# Patient Record
Sex: MALE | Race: WHITE | Employment: OTHER | URBAN - METROPOLITAN AREA
[De-identification: names, ages, dates, MRNs, and addresses within clinical notes are randomized per-mention and may not be internally consistent; named-entity substitution may affect disease eponyms.]

---

## 2017-02-08 ENCOUNTER — HOSPITAL ENCOUNTER (EMERGENCY)
Age: 62
Discharge: HOME | End: 2017-02-08
Attending: EMERGENCY MEDICINE
Payer: COMMERCIAL

## 2017-02-08 VITALS
SYSTOLIC BLOOD PRESSURE: 122 MMHG | DIASTOLIC BLOOD PRESSURE: 80 MMHG | TEMPERATURE: 98.1 F | HEART RATE: 108 BPM | HEIGHT: 72 IN | BODY MASS INDEX: 16.25 KG/M2 | RESPIRATION RATE: 20 BRPM | OXYGEN SATURATION: 96 % | WEIGHT: 120 LBS

## 2017-02-08 DIAGNOSIS — G56.31 RADIAL NERVE PALSY, RIGHT: ICD-10-CM

## 2017-02-08 DIAGNOSIS — M21.331 WRIST DROP, RIGHT: Primary | ICD-10-CM

## 2017-02-08 PROCEDURE — 99283 EMERGENCY DEPT VISIT LOW MDM: CPT

## 2017-02-08 PROCEDURE — L3908 WHO COCK-UP NONMOLDE PRE OTS: HCPCS

## 2017-02-08 NOTE — DISCHARGE INSTRUCTIONS
Radial Nerve Palsy: Care Instructions  Your Care Instructions    The radial nerve runs down the arm. It controls muscles in the back of the arm. It also helps with movement and feeling in the wrist and hand. If you injure the back of your arm or pinch the nerve, you might have trouble moving your arm, wrist, or hand. You might also have pain, weakness, numbness, tingling, or trouble lifting your wrist or fingers. This can also happen if you fall asleep in a way that puts pressure on the nerve, such as with your arm hanging over a chair. In most cases, no treatment is needed. You will slowly get more strength and feeling. This can take weeks or even months. Sometimes physical or occupational therapy is used to keep up muscle strength. You might also need other tests, such as nerve tests or an MRI. If you don't get better, or if the injury is more serious, surgery might be needed to fix the nerve or remove something pressing on it. Follow-up care is a key part of your treatment and safety. Be sure to make and go to all appointments, and call your doctor if you are having problems. It's also a good idea to know your test results and keep a list of the medicines you take. How can you care for yourself at home? · Be safe with medicines. Read and follow all instructions on the label. ¨ If the doctor gave you a prescription medicine for pain, take it as prescribed. ¨ If you are not taking a prescription pain medicine, ask your doctor if you can take an over-the-counter medicine. · Follow your doctor's directions for wearing a splint, brace, or other device to help you use your hand. When should you call for help? Call your doctor now or seek immediate medical care if:  · You have new or worse numbness in your arm, wrist or hand. · You have new or worse weakness in your arm, wrist or hand. · You have new pain, or your pain gets worse.   Watch closely for changes in your health, and be sure to contact your doctor if you do not get better as expected. Where can you learn more? Go to http://ema-cristian.info/. Enter D310 in the search box to learn more about \"Radial Nerve Palsy: Care Instructions. \"  Current as of: February 19, 2016  Content Version: 11.1  © 3842-1015 [employer]. Care instructions adapted under license by [employer] (which disclaims liability or warranty for this information). If you have questions about a medical condition or this instruction, always ask your healthcare professional. [address] any warranty or liability for your use of this information.

## 2017-02-08 NOTE — ED NOTES
I have reviewed discharge instructions with the patient and spouse. The patient and spouse verbalized understanding. All discharge education and paperwork given including follow up care, no questions at this time.

## 2017-02-08 NOTE — ED PROVIDER NOTES
HPI Comments: 58yoM to ED c/o right wrist drop x this morning. Pt states he slept in an arm chair last night, which is not something he usually does. Denies injury to wrist, elbow, shoulder, or neck. States he is able to move all fingers and has normal sensation. No other complaints or injuries. The history is provided by the patient. Past Medical History:   Diagnosis Date    Ankle fracture, left 10/19/2008    Cough     DJD (degenerative joint disease) of knee 11/10/2010    ED (erectile dysfunction) 11/10/2010    Fracture of left tibia 10/19/2008     Severe Grade IIIB    GERD (gastroesophageal reflux disease) 11/10/2010    Hearing loss     Left ankle pain     Left ankle sprain     Left ankle strain     Pain with urination     Sinusitis 11/10/2010    Wears glasses        Past Surgical History:   Procedure Laterality Date    Hx ankle fracture tx  10/20/2008    Hx orthopaedic       Several surgeries in the past which required ORIF of the distal tibia posterior approach and bone grafting x2 separate surgeries         Family History:   Problem Relation Age of Onset    Migraines Mother     Hypertension Other     Arthritis-osteo Other        Social History     Social History    Marital status:      Spouse name: N/A    Number of children: N/A    Years of education: N/A     Occupational History    Not on file. Social History Main Topics    Smoking status: Current Every Day Smoker     Packs/day: 1.50     Types: Cigarettes    Smokeless tobacco: Never Used    Alcohol use 0.5 - 3.0 oz/week     1 - 6 Cans of beer per week    Drug use: No    Sexual activity: Not Currently     Other Topics Concern    Not on file     Social History Narrative         ALLERGIES: Niacin    Review of Systems   All other systems reviewed and are negative.       Vitals:    02/08/17 1309   BP: 122/80   Pulse: (!) 108   Resp: 20   Temp: 98.1 °F (36.7 °C)   SpO2: 96%   Weight: 54.4 kg (120 lb)   Height: 6' (1.829 m)            Physical Exam   Constitutional: He appears well-developed and well-nourished. No distress. HENT:   Head: Normocephalic and atraumatic. Right Ear: External ear normal.   Left Ear: External ear normal.   Mouth/Throat: Oropharynx is clear and moist.   Eyes: Conjunctivae and EOM are normal. Pupils are equal, round, and reactive to light. Neck: Normal range of motion. Neck supple. Cardiovascular: Normal rate and regular rhythm. Pulmonary/Chest: Effort normal.   Abdominal: Soft. Bowel sounds are normal.   Musculoskeletal:        Right shoulder: Normal.        Right wrist: He exhibits decreased range of motion and deformity. He exhibits no tenderness, no bony tenderness, no swelling, no effusion, no crepitus and no laceration. Right upper arm: Normal.        Right forearm: Normal.   +right wrist drop   Lymphadenopathy:     He has no cervical adenopathy. Neurological: He is alert. Skin: Skin is warm and dry. No rash noted. He is not diaphoretic. Psychiatric: He has a normal mood and affect. MDM  Number of Diagnoses or Management Options  Radial nerve palsy, right:   Wrist drop, right:   Diagnosis management comments: Pt presents with right wrist drop, likely from sleeping in armchair last night. ROM and sensation intact to fingers. Will put in volar cock-up splint and advised ortho f/u. EMILIANO Epperson 1:50 PM    Splint Note      Type of Splint: volar cock up splint  Location: right wrist    Applied by tech neurovascular intact prior to splint placement neurovascular intact after splint placement splint is placed in good position.      EMILIANO Epperson  February 8, 2017  1:51 PM      Risk of Complications, Morbidity, and/or Mortality  Presenting problems: moderate  Diagnostic procedures: minimal  Management options: low    Patient Progress  Patient progress: stable    ED Course       Procedures

## 2017-11-26 ENCOUNTER — APPOINTMENT (OUTPATIENT)
Dept: CT IMAGING | Age: 62
End: 2017-11-26
Attending: PHYSICIAN ASSISTANT
Payer: COMMERCIAL

## 2017-11-26 ENCOUNTER — APPOINTMENT (OUTPATIENT)
Dept: GENERAL RADIOLOGY | Age: 62
End: 2017-11-26
Attending: EMERGENCY MEDICINE
Payer: COMMERCIAL

## 2017-11-26 ENCOUNTER — HOSPITAL ENCOUNTER (EMERGENCY)
Age: 62
Discharge: HOME | End: 2017-11-26
Attending: EMERGENCY MEDICINE | Admitting: EMERGENCY MEDICINE
Payer: COMMERCIAL

## 2017-11-26 VITALS
SYSTOLIC BLOOD PRESSURE: 119 MMHG | HEART RATE: 98 BPM | WEIGHT: 150 LBS | BODY MASS INDEX: 20.32 KG/M2 | RESPIRATION RATE: 16 BRPM | OXYGEN SATURATION: 95 % | HEIGHT: 72 IN | TEMPERATURE: 97.7 F | DIASTOLIC BLOOD PRESSURE: 73 MMHG

## 2017-11-26 DIAGNOSIS — E86.0 DEHYDRATION: ICD-10-CM

## 2017-11-26 DIAGNOSIS — F10.10 ALCOHOL ABUSE: ICD-10-CM

## 2017-11-26 DIAGNOSIS — S42.292A OTHER CLOSED DISPLACED FRACTURE OF PROXIMAL END OF LEFT HUMERUS, INITIAL ENCOUNTER: ICD-10-CM
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
## 2017-11-26 LAB
ALBUMIN SERPL-MCNC: 3 G/DL (ref 3.4–5)
ALBUMIN/GLOB SERPL: 0.7 {RATIO} (ref 0.8–1.7)
ALP SERPL-CCNC: 147 U/L (ref 45–117)
ALT SERPL-CCNC: 55 U/L (ref 16–61)
ANION GAP SERPL CALC-SCNC: 9 MMOL/L (ref 3–18)
AST SERPL-CCNC: 113 U/L (ref 15–37)
BASOPHILS # BLD: 0 K/UL (ref 0–0.1)
BASOPHILS NFR BLD: 1 % (ref 0–2)
BILIRUB SERPL-MCNC: 0.5 MG/DL (ref 0.2–1)
BUN SERPL-MCNC: 2 MG/DL (ref 7–18)
BUN/CREAT SERPL: 6 (ref 12–20)
CALCIUM SERPL-MCNC: 8.2 MG/DL (ref 8.5–10.1)
CHLORIDE SERPL-SCNC: 97 MMOL/L (ref 100–108)
CO2 SERPL-SCNC: 26 MMOL/L (ref 21–32)
CREAT SERPL-MCNC: 0.34 MG/DL (ref 0.6–1.3)
DIFFERENTIAL METHOD BLD: ABNORMAL
EOSINOPHIL # BLD: 0 K/UL (ref 0–0.4)
EOSINOPHIL NFR BLD: 1 % (ref 0–5)
ERYTHROCYTE [DISTWIDTH] IN BLOOD BY AUTOMATED COUNT: 13 % (ref 11.6–14.5)
ETHANOL SERPL-MCNC: 95 MG/DL (ref 0–3)
GLOBULIN SER CALC-MCNC: 4.1 G/DL (ref 2–4)
GLUCOSE SERPL-MCNC: 88 MG/DL (ref 74–99)
HCT VFR BLD AUTO: 37.1 % (ref 36–48)
HGB BLD-MCNC: 13.3 G/DL (ref 13–16)
LYMPHOCYTES # BLD: 1 K/UL (ref 0.9–3.6)
LYMPHOCYTES NFR BLD: 28 % (ref 21–52)
MAGNESIUM SERPL-MCNC: 1.9 MG/DL (ref 1.6–2.6)
MCH RBC QN AUTO: 34.5 PG (ref 24–34)
MCHC RBC AUTO-ENTMCNC: 35.8 G/DL (ref 31–37)
MCV RBC AUTO: 96.1 FL (ref 74–97)
MONOCYTES # BLD: 0.3 K/UL (ref 0.05–1.2)
MONOCYTES NFR BLD: 9 % (ref 3–10)
NEUTS SEG # BLD: 2.1 K/UL (ref 1.8–8)
NEUTS SEG NFR BLD: 61 % (ref 40–73)
PLATELET # BLD AUTO: 110 K/UL (ref 135–420)
PMV BLD AUTO: 11 FL (ref 9.2–11.8)
POTASSIUM SERPL-SCNC: 3.8 MMOL/L (ref 3.5–5.5)
PROT SERPL-MCNC: 7.1 G/DL (ref 6.4–8.2)
RBC # BLD AUTO: 3.86 M/UL (ref 4.7–5.5)
SODIUM SERPL-SCNC: 132 MMOL/L (ref 136–145)
WBC # BLD AUTO: 3.5 K/UL (ref 4.6–13.2)

## 2017-11-26 PROCEDURE — 80307 DRUG TEST PRSMV CHEM ANLYZR: CPT | Performed by: PHYSICIAN ASSISTANT

## 2017-11-26 PROCEDURE — 80053 COMPREHEN METABOLIC PANEL: CPT | Performed by: PHYSICIAN ASSISTANT

## 2017-11-26 PROCEDURE — 83735 ASSAY OF MAGNESIUM: CPT | Performed by: PHYSICIAN ASSISTANT

## 2017-11-26 PROCEDURE — 75810000053 HC SPLINT APPLICATION

## 2017-11-26 PROCEDURE — 90715 TDAP VACCINE 7 YRS/> IM: CPT | Performed by: PHYSICIAN ASSISTANT

## 2017-11-26 PROCEDURE — 99284 EMERGENCY DEPT VISIT MOD MDM: CPT

## 2017-11-26 PROCEDURE — 90471 IMMUNIZATION ADMIN: CPT

## 2017-11-26 PROCEDURE — 72125 CT NECK SPINE W/O DYE: CPT

## 2017-11-26 PROCEDURE — 96374 THER/PROPH/DIAG INJ IV PUSH: CPT

## 2017-11-26 PROCEDURE — 96376 TX/PRO/DX INJ SAME DRUG ADON: CPT

## 2017-11-26 PROCEDURE — 74011250636 HC RX REV CODE- 250/636: Performed by: PHYSICIAN ASSISTANT

## 2017-11-26 PROCEDURE — 70450 CT HEAD/BRAIN W/O DYE: CPT

## 2017-11-26 PROCEDURE — 96361 HYDRATE IV INFUSION ADD-ON: CPT

## 2017-11-26 PROCEDURE — 73060 X-RAY EXAM OF HUMERUS: CPT

## 2017-11-26 PROCEDURE — 85025 COMPLETE CBC W/AUTO DIFF WBC: CPT | Performed by: PHYSICIAN ASSISTANT

## 2017-11-26 RX ORDER — OXYCODONE AND ACETAMINOPHEN 5; 325 MG/1; MG/1
1 TABLET ORAL
Qty: 12 TAB | Refills: 0 | Status: SHIPPED | OUTPATIENT
Start: 2017-11-26 | End: 2018-08-07

## 2017-11-26 RX ORDER — FENTANYL CITRATE 50 UG/ML
50 INJECTION, SOLUTION INTRAMUSCULAR; INTRAVENOUS
Status: COMPLETED | OUTPATIENT
Start: 2017-11-26 | End: 2017-11-26

## 2017-11-26 RX ORDER — SODIUM CHLORIDE 9 MG/ML
1000 INJECTION, SOLUTION INTRAVENOUS ONCE
Status: COMPLETED | OUTPATIENT
Start: 2017-11-26 | End: 2017-11-26

## 2017-11-26 RX ADMIN — SODIUM CHLORIDE 1000 ML: 9 INJECTION, SOLUTION INTRAVENOUS at 11:37

## 2017-11-26 RX ADMIN — FENTANYL CITRATE 50 MCG: 50 INJECTION INTRAMUSCULAR; INTRAVENOUS at 13:07

## 2017-11-26 RX ADMIN — TETANUS TOXOID, REDUCED DIPHTHERIA TOXOID AND ACELLULAR PERTUSSIS VACCINE, ADSORBED 0.5 ML: 5; 2.5; 8; 8; 2.5 SUSPENSION INTRAMUSCULAR at 14:35

## 2017-11-26 RX ADMIN — SODIUM CHLORIDE 1000 ML: 9 INJECTION, SOLUTION INTRAVENOUS at 12:51

## 2017-11-26 RX ADMIN — FENTANYL CITRATE 50 MCG: 50 INJECTION INTRAMUSCULAR; INTRAVENOUS at 14:53

## 2017-11-26 NOTE — ED NOTES
Pt discharged per MD order. Pt verbalized understanding of discharge instructions and follow up care. Prescription education given. Pt ambulated independently out of ED.

## 2017-11-26 NOTE — DISCHARGE INSTRUCTIONS
MyNines Activation    Thank you for requesting access to MyNines. Please follow the instructions below to securely access and download your online medical record. MyNines allows you to send messages to your doctor, view your test results, renew your prescriptions, schedule appointments, and more. How Do I Sign Up? 1. In your internet browser, go to [email or web address]  2. Click on the First Time User? Click Here link in the Sign In box. You will be redirect to the New Member Sign Up page. 3. Enter your MyNines Access Code exactly as it appears below. You will not need to use this code after youve completed the sign-up process. If you do not sign up before the expiration date, you must request a new code. MyNines Access Code: [email or web address]  Expires: 2018  2:32 PM (This is the date your MyNines access code will )    4. Enter the last four digits of your Social Security Number (xxxx) and Date of Birth (mm/dd/yyyy) as indicated and click Submit. You will be taken to the next sign-up page. 5. Create a MyNines ID. This will be your MyNines login ID and cannot be changed, so think of one that is secure and easy to remember. 6. Create a MyNines password. You can change your password at any time. 7. Enter your Password Reset Question and Answer. This can be used at a later time if you forget your password. 8. Enter your e-mail address. You will receive e-mail notification when new information is available in [address]. 9. Click Sign Up. You can now view and download portions of your medical record. 10. Click the Download Summary menu link to download a portable copy of your medical information. Additional Information    If you have questions, please visit the Frequently Asked Questions section of the MyNines website at [email or web address] [email or web address]/. Remember, MyNines is NOT to be used for urgent needs. For medical emergencies, dial 911.

## 2017-11-26 NOTE — ED PROVIDER NOTES
HPI Comments: 63 yr old male, hx GERD, ED, DJD, and alcohol abuse presents to the ED complaining of L upper arm pain after a fall at 2 am this morning. Pt states he got up in the middle of the night and tripped over the carpet falling into a chair. Pt is unsure if he had a head injury or lost consciousness at that time. Pt smells of alcohol in the exam room. He states he \"drank a few beers\" earlier today. Pt's wife reports he is a heavy drinker and drinks \"several beers a day. \" Pt denies neck pain, vision changes, dizziness, back pain, and abdominal sx. No other complaints. Patient is a 63 y.o. male presenting with arm problem. Arm Injury    Pertinent negatives include no back pain and no neck pain. Past Medical History:   Diagnosis Date    Ankle fracture, left 10/19/2008    Cough     DJD (degenerative joint disease) of knee 11/10/2010    ED (erectile dysfunction) 11/10/2010    Fracture of left tibia 10/19/2008    Severe Grade IIIB    GERD (gastroesophageal reflux disease) 11/10/2010    Hearing loss     Left ankle pain     Left ankle sprain     Left ankle strain     Pain with urination     Sinusitis 11/10/2010    Wears glasses        Past Surgical History:   Procedure Laterality Date    HX ANKLE FRACTURE TX  10/20/2008    HX ORTHOPAEDIC      Several surgeries in the past which required ORIF of the distal tibia posterior approach and bone grafting x2 separate surgeries         Family History:   Problem Relation Age of Onset    Migraines Mother     Hypertension Other     Arthritis-osteo Other        Social History     Social History    Marital status:      Spouse name: N/A    Number of children: N/A    Years of education: N/A     Occupational History    Not on file.      Social History Main Topics    Smoking status: Current Every Day Smoker     Packs/day: 1.50     Types: Cigarettes    Smokeless tobacco: Never Used    Alcohol use 0.5 - 3.0 oz/week     1 - 6 Cans of beer per week    Drug use: No    Sexual activity: Not Currently     Other Topics Concern    Not on file     Social History Narrative         ALLERGIES: Niacin    Review of Systems   Constitutional: Negative. Negative for chills, diaphoresis, fatigue and fever. HENT: Negative. Negative for congestion, ear pain, rhinorrhea and sore throat. Eyes: Negative. Negative for pain and redness. Respiratory: Negative. Negative for cough, shortness of breath, wheezing and stridor. Cardiovascular: Negative. Negative for chest pain and leg swelling. Gastrointestinal: Negative. Negative for abdominal pain, constipation, diarrhea, nausea and vomiting. Endocrine: Negative. Genitourinary: Negative. Negative for dysuria, flank pain, frequency and hematuria. Musculoskeletal: Positive for arthralgias. Negative for back pain, myalgias, neck pain and neck stiffness. Skin: Negative. Negative for rash and wound. Allergic/Immunologic: Negative. Neurological: Negative. Negative for dizziness, seizures, weakness and headaches. Hematological: Negative. Psychiatric/Behavioral: Negative. All other systems reviewed and are negative. Vitals:    11/26/17 0910   BP: 95/73   Pulse: 98   Resp: 16   Temp: 97.7 °F (36.5 °C)   SpO2: 95%   Weight: 68 kg (150 lb)   Height: 6' (1.829 m)            Physical Exam   Constitutional: He is oriented to person, place, and time. He appears well-developed and well-nourished. He appears distressed. Pt appears moderately distressed in the exam room    HENT:   Head: Normocephalic and atraumatic. Neck: Normal range of motion. Neck supple. No midline TTP noted along the posterior cervical spine    Cardiovascular: Normal rate, regular rhythm and normal heart sounds. Exam reveals no gallop and no friction rub. No murmur heard. Pulmonary/Chest: Effort normal and breath sounds normal. No stridor. No respiratory distress. He has no wheezes. He has no rales.    Musculoskeletal: He exhibits edema and tenderness. LUE: intact pulses, good sensation,  strength 4/5, no bony tenderness noted over the wrist, digits, or elbow. Edema and significant TTP noted to the proximal humerus just distal to the humeral head, no obvious shoulder deformity noted. Limited ROM of the shoulder due to pain. Neurological: He is alert and oriented to person, place, and time. Skin: Skin is warm and dry. No rash noted. He is not diaphoretic. No erythema. Psychiatric: He has a normal mood and affect. His behavior is normal. Thought content normal.   Pt smells of alcohol, but no slurred speech or other signs of acute intoxication noted. Nursing note and vitals reviewed. MDM  Number of Diagnoses or Management Options  Diagnosis management comments: Impression:  Humerus fx, fall, alcohol abuse, dehydration, elevated LFTs    IV inserted 2 L saline given, pt initially hypotensive, after 1 L fluids received his systolic pressure improved and 50 fentanyl given     WBC 3.5, RBC 3.86, MCH 34.5, plt 110, Na 132, Cl 97, BUN 2, Cr 0.34, BUCR 6, Ca 8.2, SGOT 113, , ALB 3, GLOB 4.1, AGRAT 0.7, alcohol 95    X-ray Acute transverse fracture through the proximal shaft of left humerus with  posterior angulation and posterior subluxation as above.     No shoulder dislocation.     CT head: No acute intracranial abnormality. CT cervical: No definite CT evidence of acute C-spine injury seen.     Minimal retrolisthesis of C5 on C6, likely chronic with associated mild DDD and  Spondylosis. Consulted with Dr. Hali Galeana, who recommends contacting ortho on call. Spoke with Dr. Harmony Argueta and discussed the case. Pt is intoxicated and cannot be operated on today. Recommendation per Dr. Harmony Argueta is to splint the pt and have him be seen in the office tomorrow. I have explained to the pt that he will have to be sober in order to electively have this procedure done. Pt agrees with the plan.  Splint and sling applied. Patient is stable for discharge at this time. Rx for percocet given. Rest and follow-up with ortho tomorrow. Return to the ED immediately for any new or worsening sx.   Ramila Robb PA-C 2:31 PM        Amount and/or Complexity of Data Reviewed  Clinical lab tests: reviewed and ordered  Tests in the radiology section of CPT®: ordered and reviewed    Risk of Complications, Morbidity, and/or Mortality  Presenting problems: moderate  Diagnostic procedures: moderate  Management options: moderate    Patient Progress  Patient progress: stable    ED Course       Procedures

## 2017-11-26 NOTE — ED TRIAGE NOTES
The patient presents with complaint of left upper arm and shoulder pain that began last night after a fall.

## 2017-11-27 ENCOUNTER — OFFICE VISIT (OUTPATIENT)
Dept: ORTHOPEDIC SURGERY | Age: 62
End: 2017-11-27

## 2017-11-27 VITALS
HEART RATE: 102 BPM | OXYGEN SATURATION: 100 % | TEMPERATURE: 98.6 F | HEIGHT: 72 IN | SYSTOLIC BLOOD PRESSURE: 135 MMHG | BODY MASS INDEX: 19.64 KG/M2 | DIASTOLIC BLOOD PRESSURE: 89 MMHG | WEIGHT: 145 LBS

## 2017-11-27 DIAGNOSIS — S42.292A OTHER CLOSED DISPLACED FRACTURE OF PROXIMAL END OF LEFT HUMERUS, INITIAL ENCOUNTER: Primary | ICD-10-CM

## 2017-11-27 RX ORDER — OXYCODONE AND ACETAMINOPHEN 5; 325 MG/1; MG/1
1-2 TABLET ORAL
Qty: 60 TAB | Refills: 0 | Status: SHIPPED | OUTPATIENT
Start: 2017-11-27 | End: 2018-05-22 | Stop reason: SDUPTHER

## 2017-11-27 NOTE — PROGRESS NOTES
Bernardo Kraft  1955   Chief Complaint   Patient presents with    Shoulder Pain     LEFT        HISTORY OF PRESENT ILLNESS  Bernardo Kraft is a 63 y.o. male who presents today for evaluation of left proximal humerus fracture. he rates his pain 10/10 today. Patient recalls drinking Saturday night when he tripped on a throw rug and fell onto the outstretched left arm. Patient seen in the ED following this and placed in a sling and provided prescription for Percocet Patient describes the pain as aching and sharp that is Constant in nature. Symptoms are worse with movement of the LUE and is better with  pain medicine. Associated symptoms include pain. Since problem started, it: is unchanged. Pain does wake patient up at night. Has taken Percocet for the problem. Has tried following treatments: Injections:YES; Brace:NO; Therapy:NO; Cane/Crutch:NO       Allergies   Allergen Reactions    Niacin Rash        Past Medical History:   Diagnosis Date    Ankle fracture, left 10/19/2008    Cough     DJD (degenerative joint disease) of knee 11/10/2010    ED (erectile dysfunction) 11/10/2010    Fracture of left tibia 10/19/2008    Severe Grade IIIB    GERD (gastroesophageal reflux disease) 11/10/2010    Hearing loss     Left ankle pain     Left ankle sprain     Left ankle strain     Pain with urination     Sinusitis 11/10/2010    Wears glasses       Social History     Social History    Marital status:      Spouse name: N/A    Number of children: N/A    Years of education: N/A     Occupational History    Not on file.      Social History Main Topics    Smoking status: Current Every Day Smoker     Packs/day: 1.50     Types: Cigarettes    Smokeless tobacco: Never Used    Alcohol use 0.5 - 3.0 oz/week     1 - 6 Cans of beer per week    Drug use: No    Sexual activity: Not Currently     Other Topics Concern    Not on file     Social History Narrative      Past Surgical History:   Procedure Laterality Date    HX ANKLE FRACTURE TX  10/20/2008    HX ORTHOPAEDIC      Several surgeries in the past which required ORIF of the distal tibia posterior approach and bone grafting x2 separate surgeries      Family History   Problem Relation Age of Onset   [facility] Migraines Mother     Hypertension Other     Arthritis-osteo Other       Current Outpatient Prescriptions   Medication Sig    oxyCODONE-acetaminophen (PERCOCET) 5-325 mg per tablet Take 1-2 Tabs by mouth every four (4) hours as needed for Pain. Max Daily Amount: 12 Tabs. Do not take until after surgery    oxyCODONE-acetaminophen (PERCOCET) 5-325 mg per tablet Take 1 Tab by mouth every six (6) hours as needed for Pain. Max Daily Amount: 4 Tabs. No current facility-administered medications for this visit. REVIEW OF SYSTEM   Patient denies: Weight loss, Fever/Chills, HA, Visual changes, Fatigue, Chest pain, SOB, Abdominal pain, N/V/D/C, Blood in stool or urine, Edema. Pertinent positive as above in HPI. All others were negative    PHYSICAL EXAM:   Visit Vitals    /89    Pulse (!) 102    Temp 98.6 °F (37 °C) (Oral)    Ht 6' (1.829 m)    Wt 145 lb (65.8 kg)    SpO2 100%    BMI 19.67 kg/m2     The patient is a well-developed, well-nourished male   in no acute distress. The patient is alert and oriented times three. The patient is alert and oriented times three. Mood and affect are normal.  LYMPHATIC: lymph nodes are not enlarged and are within normal limits  SKIN: normal in color and non tender to palpation. There are no bruises or abrasions noted. NEUROLOGICAL: Motor sensory exam is within normal limits. Reflexes are equal bilaterally.  There is normal sensation to pinprick and light touch  MUSCULOSKELETAL:  Examination Left shoulder   Skin Intact   AC joint tenderness -   Biceps tenderness +   Forward flexion/Elevation ROM 20   Active abduction ROM 20   Glenohumeral abduction 20   External rotation ROM 0 Internal rotation ROM 0   Apprehension -   Cristals Relocation -   Jerk -   Load and Shift -   Obriens -   Speeds -   Impingement sign -   Supraspinatus/Empty Can -, 5/5   External Rotation Strength -, 5/5   Lift Off/Belly Press -, 5/5   Neurovascular Intact        IMAGING:   XR of the left humerus dated 11/27/17 was reviewed and read:   IMPRESSION:    Acute transverse fracture through the proximal shaft of left humerus with posterior angulation and posterior subluxation as above. No shoulder dislocation. IMPRESSION:      ICD-10-CM ICD-9-CM    1. Other closed displaced fracture of proximal end of left humerus, initial encounter S42.292A 812.09 EKG, 12 LEAD, INITIAL        PLAN:  1. I discussed the risks and benefits and potential adverse outcomes of both operative vs non operative treatment of left proximal humerus fracture with the patient. Patient wishes to proceed with left proximal humerus ORIF. Risks of operative intervention include but not limited to bleeding, infection, deep vein thrombosis, pulmonary embolism, death, limb length discrepancy, reflexive sympathetic dystrophy, fat embolism syndrome,damage to blood vessels and nerves, malunion, non-union, delayed union, failure of hardware, post traumatic arthritis, stroke, heart attack, and death. Patient understands that infection may arise and may require numerous surgeries. History and physical exam performed today. Risk factors include: smoker  2. No cortisone injection indicated today   3. No Physical/Occupational Therapy indicated today  4. No diagnostic test indicated today  5. No durable medical equipment indicated today  6. No referral indicated today   7. No medications indicated today  8.  No Narcotic indicated today       RTC H&P performed today  Follow-up Disposition: Not on File    Scribed by Yanira Hernandes [address] [place] [address] [address]) as dictated by Miguel Lagos MD    I, Dr. Miguel Lagos, confirm that all documentation is accurate.     Elmira Arizmendi M.D.   [facility]

## 2017-11-27 NOTE — LETTER
Patient: Marylu Pérez Surgery Date: 11/29/17  Time: 1:30PM 
 
PROCEDURE: LEFT SHOULDER ORIF Report for your Surgery at 12:00PM  @ [id number] [place]: 
  
o Five (5) days prior to surgery STOP taking ASPIRIN, ANTI-INFLAMMATORY , and/or BLOOD THINNER MEDICATIONS (such as COUMADIN, PLAVIX, HEPARIN or others). o If you are taking blood thinner medication please contact your prescribing physician for any special instructions. LAB: Report to  [facility]  or [facility]  14 days before your surgery date. (Your orders for these tests are in [facility] at the [address] may not eat or drink anything after midnight the night before  
o It doesn't matter if you have eaten before going to the [address] 
o If required labs and EKG are not completed Surgery will be canceled. THE DAY OF SURGERY: 
  
 
o Do not eat, chew gum or drink anything after Midnight prior to the date of your surgery 
o Take your regular medications with small sips of water unless otherwise instructed. (This means blood pressure and/or heart medicine) If you are insulin dependent, bring your insulin with you, unless otherwise instructed. 
o Bring a list of your medications and the dosage to the hospital. 
o Do not wear nail polish, make-up or jewelry. o Do not bring valuables or money to the hospital. 
o Have someone accompany you so they may drive you home following the surgery. Bring your drivers license, your insurance card and Hospital co-pay. Return for your postoperative visit on Wednesday, December 06, 2017 02:30 PM at the [id number] [place], [address] This appointment will be with , Dr. Concepcion Cleveland physician assistant. Shari Richmond  [phone]      Surgical Coordinator

## 2017-11-27 NOTE — H&P
HISTORY AND PHYSICAL          Patient: Zehra Cadet                MRN: [id number]       SSN: [id number]  YOB: 1955          AGE: 63 y.o. SEX: male      Patient scheduled for:  Left Humeral Intramedullary Rodding  Surgeon: Tiarra Bazzi MD    ANESTHESIA TYPE:  General    HISTORY:     The patient was seen in the office today for a preoperative history and physical for an upcoming above listed surgery. The patient is a pleasant 63 y.o. male who has a history of left proximal humerus fracture. he rates his pain 10/10 today. Patient recalls drinking Saturday night when he tripped on a throw rug and fell onto the outstretched left arm. Patient seen in the ED following this and placed in a sling and provided prescription for Percocet Patient describes the pain as aching and sharp that is Constant in nature. Symptoms are worse with movement of the LUE and is better with  pain medicine. Due to the current findings, affected activity of daily living and continued pain and discomfort, surgical intervention is indicated. The alternatives, risks, and complications, including but not limited to infection, blood loss, need for blood transfusion, neurovascular damage, tika-incisional numbness, subcutaneous hematoma, bone fracture, anesthetic complications, DVT, PE, death, RSD, postoperative stiffness and pain, possible surgical scar, delayed healing and nonhealing, reflexive sympathetic dystrophy, damage to blood vessels and nerves, need for more surgery, MI, and stroke,  failure of hardware, gait disturbances,have been discussed. The patient understands and wishes to proceed with surgery.      PAST MEDICAL HISTORY:     Past Medical History:   Diagnosis Date    Ankle fracture, left 10/19/2008    Cough     DJD (degenerative joint disease) of knee 11/10/2010    ED (erectile dysfunction) 11/10/2010    Fracture of left tibia 10/19/2008    Severe Grade IIIB    GERD (gastroesophageal reflux disease) 11/10/2010    Hearing loss     Left ankle pain     Left ankle sprain     Left ankle strain     Pain with urination     Sinusitis 11/10/2010    Wears glasses        CURRENT MEDICATIONS:     Current Outpatient Prescriptions   Medication Sig Dispense Refill    oxyCODONE-acetaminophen (PERCOCET) 5-325 mg per tablet Take 1-2 Tabs by mouth every four (4) hours as needed for Pain. Max Daily Amount: 12 Tabs. Do not take until after surgery 60 Tab 0    oxyCODONE-acetaminophen (PERCOCET) 5-325 mg per tablet Take 1 Tab by mouth every six (6) hours as needed for Pain. Max Daily Amount: 4 Tabs. 12 Tab 0       ALLERGIES:     Allergies   Allergen Reactions    Niacin Rash         SURGICAL HISTORY:     Past Surgical History:   Procedure Laterality Date    HX ANKLE FRACTURE TX  10/20/2008    HX ORTHOPAEDIC      Several surgeries in the past which required ORIF of the distal tibia posterior approach and bone grafting x2 separate surgeries       SOCIAL HISTORY:     Social History     Social History    Marital status:      Spouse name: N/A    Number of children: N/A    Years of education: N/A     Social History Main Topics    Smoking status: Current Every Day Smoker     Packs/day: 1.50     Types: Cigarettes    Smokeless tobacco: Never Used    Alcohol use 0.5 - 3.0 oz/week     1 - 6 Cans of beer per week    Drug use: No    Sexual activity: Not Currently     Other Topics Concern    None     Social History Narrative       FAMILY HISTORY:     Family History   Problem Relation Age of Onset   [facility] Mother     Hypertension Other     Arthritis-osteo Other        REVIEW OF SYSTEMS:     Negative for fevers, chills, chest pain, shortness of breath, weight loss, recent illness     General: Negative for fever and chills. No unexpected change in weight. Denies fatigue. No change in appetite. Skin: Negative for rash or itching.    HEENT: Negative for congestion, sore throat, neck pain and neck stiffness. No change in vision or hearing. Hasn't noted any enlarged lymph nodes in the neck. Cardiovascular:  Negative for chest pain and palpitations. Has not noted pedal edema. Respiratory: Negative for cough, colds, sinus, hemoptysis, shortness of breath and wheezing. Gastrointestinal: Negative for nausea and vomiting, rectal bleeding, coffee ground emesis, abdominal pain, diarrhea and constipation. Genitourinary: Negative for dysuria, frequency urgency, or burning on micturition. No flank pain, no foul smelling urine, no difficulty with initiating urination. Hematological: Negative for bleeding or easy bruising. Musculoskeletal: Negative  for arthralgias, back pain or neck pain. Neurological: Negative for dizziness, seizures or syncopal episodes. Denies headaches. Endocrine: Denies excessive thirst.  No heat/cold intolerance. Psychiatric: Negative for depression or insomnia. PHYSICAL EXAMINATION:     VITALS:   Visit Vitals    /89    Pulse (!) 102    Temp 98.6 °F (37 °C) (Oral)    Ht 6' (1.829 m)    Wt 145 lb (65.8 kg)    SpO2 100%    BMI 19.67 kg/m2     GEN:  Well developed, well nourished 63 y.o. male in no acute distress. HEENT: Normocephalic and atraumatic. Eyes: Conjunctivae and EOM are normal.Pupils are equal, round, and reactive to light. External ear normal appearance, external nose normal appearing. Mouth/Throat: Oropharynx is clear and moist, able to handle oral secretions w/out difficulty, airway patent  NECK: Supple. Normal ROM, No lymphadenopathy. Trachea is midline. No bruising, swelling or deformity  RESP: Clear to auscultation bilaterally. No wheezes, rales, rhonchi. Normal effort and breath sounds. No respiratory distress  CARDIO: Normal rate, regular rhythm and normal heart sounds. No MGR. ABDOMEN: Soft, non-tender, non-distended, normoactive bowel sounds in all four quadrants. There is no tenderness. There is no rebound and no guarding.    BACK: No CVA or spinal tenderness  BREAST:  Deferred  PELVIC:    Deferred   RECTAL:  Deferred   :           Deferred  EXTREMITIES: EXAMINATION OF: left shoulder  Examination Left shoulder   Skin Intact   AC joint tenderness -   Biceps tenderness +   Forward flexion/Elevation ROM 20   Active abduction ROM 20   Glenohumeral abduction 20   External rotation ROM 0   Internal rotation ROM 0   Apprehension -   Cristals Relocation -   Jerk -   Load and Shift -   Obriens -   Speeds -   Impingement sign -   Supraspinatus/Empty Can -, 5/5   External Rotation Strength -, 5/5   Lift Off/Belly Press -, 5/5   Neurovascular Intact        NEUROVASCULAR: Sensation intact to light touch and strength grossly intact and symmetrical. No nystagmus. Positive distal pulses and capillary refill. DVT ASSESSMENT:  There is not  calf tenderness. No evidence of DVT seen on physical exam.  MOTOR: In tact  PSYCH: Alert an oriented to person, place and time. Mood, memory, affect, behavior and judgment normal       RADIOGRAPHS & DIAGNOSTIC STUDIES:     MRI/xray reveals :   XR of the left humerus dated 11/27/17 was reviewed and read:   IMPRESSION:    Acute transverse fracture through the proximal shaft of left humerus with posterior angulation and posterior subluxation as above.   No shoulder dislocation.         LABS:       @  CBC:   Lab Results   Component Value Date/Time    WBC 3.5 11/26/2017 10:45 AM    RBC 3.86 11/26/2017 10:45 AM    HGB 13.3 11/26/2017 10:45 AM    HCT 37.1 11/26/2017 10:45 AM    PLATELET 583 17/20/2382 10:45 AM    and BMP:   Lab Results   Component Value Date/Time    Glucose 88 11/26/2017 10:45 AM    Sodium 132 11/26/2017 10:45 AM    Potassium 3.8 11/26/2017 10:45 AM    Chloride 97 11/26/2017 10:45 AM    CO2 26 11/26/2017 10:45 AM    BUN 2 11/26/2017 10:45 AM    Creatinine 0.34 11/26/2017 10:45 AM    Calcium 8.2 11/26/2017 10:45 AM   @    Preoperative labs were reviewed and are substantially within normal limits  EKG: pending      ASSESSMENT:       Encounter Diagnosis   Name Primary?  Other closed displaced fracture of proximal end of left humerus, initial encounter Yes       PLAN:     Again, the alternatives, risks, and complications, as well as expected outcome were discussed. The patient understands and agrees to proceed with Left Humeral Intramedullary Rodding pending EKG results.  Patient given orders listed below:    Orders Placed This Encounter    EKG, 12 LEAD, INITIAL    oxyCODONE-acetaminophen (PERCOCET) 5-325 mg per tablet         Oneyda Dubose PA-C  11/27/2017  2:02 PM

## 2017-11-27 NOTE — LETTER
[facility] Surgery [facility] Fax to [phone]                                        Telephone: [phone] or [phone] To be completed by Physician Office: 
 
Date: 2017    Requested by: Jacey Sanchez Phone No: [phone]  Fax No:  [phone] Surgery Date: 17 Requested Time: 3RD CASE Surgeon: Ned Justice MD  Assistant/2nd Surgeon: Please Telida:            EMERGENT       URGENT         ELECTIVE  
CPT CODE*  Procedure 20156 LEFT SHOULDER  IM JANUARY *CPT code is required for ALL procedures. Patient Information: 
 
Name: Leodan Boucher SSN: [id number]  : 1955   Male/Female: male Home Phone No: [phone] (home) Primary Insurance: [employer] Insurance Number: [id number] *If self-pay, please fax Self-Pay/Martha Verification Form with this request. Elective cases will not be scheduled until approved. Latex Allergy:  Yes Allergies: Allergies Allergen Reactions  Niacin Rash  
 
 
 ICD10 code(s): S42.292A  Diagnosis: CLOSED DISPLACED FRACTURE OF PROXIMAL END OF LEFT HUMERUS Admission:  Outpatient Anesthesia Type General 
 
Comments/Special Equipment and/or  SYNTHES  C-Arm   

## 2017-11-28 ENCOUNTER — HOSPITAL ENCOUNTER (OUTPATIENT)
Dept: PREADMISSION TESTING | Age: 62
Discharge: HOME | End: 2017-11-28
Payer: COMMERCIAL

## 2017-11-28 ENCOUNTER — ANESTHESIA EVENT (OUTPATIENT)
Dept: SURGERY | Age: 62
End: 2017-11-28
Payer: COMMERCIAL

## 2017-11-28 DIAGNOSIS — Z01.818 PRE-OP EXAM: ICD-10-CM

## 2017-11-28 LAB
ATRIAL RATE: 117 BPM
CALCULATED R AXIS, ECG10: 66 DEGREES
CALCULATED T AXIS, ECG11: 46 DEGREES
DIAGNOSIS, 93000: NORMAL
Q-T INTERVAL, ECG07: 316 MS
QRS DURATION, ECG06: 88 MS
QTC CALCULATION (BEZET), ECG08: 492 MS
VENTRICULAR RATE, ECG03: 146 BPM

## 2017-11-28 PROCEDURE — 93005 ELECTROCARDIOGRAM TRACING: CPT

## 2017-11-29 ENCOUNTER — ANESTHESIA (OUTPATIENT)
Dept: SURGERY | Age: 62
End: 2017-11-29
Payer: COMMERCIAL

## 2017-11-29 ENCOUNTER — HOSPITAL ENCOUNTER (OUTPATIENT)
Age: 62
Setting detail: OUTPATIENT SURGERY
Discharge: HOME | End: 2017-11-29
Attending: ORTHOPAEDIC SURGERY | Admitting: ORTHOPAEDIC SURGERY
Payer: COMMERCIAL

## 2017-11-29 ENCOUNTER — APPOINTMENT (OUTPATIENT)
Dept: GENERAL RADIOLOGY | Age: 62
End: 2017-11-29
Attending: ORTHOPAEDIC SURGERY
Payer: COMMERCIAL

## 2017-11-29 VITALS
DIASTOLIC BLOOD PRESSURE: 86 MMHG | OXYGEN SATURATION: 98 % | RESPIRATION RATE: 14 BRPM | WEIGHT: 113.31 LBS | BODY MASS INDEX: 15.35 KG/M2 | HEIGHT: 72 IN | SYSTOLIC BLOOD PRESSURE: 139 MMHG | HEART RATE: 75 BPM | TEMPERATURE: 97.3 F

## 2017-11-29 PROCEDURE — 74011250636 HC RX REV CODE- 250/636: Performed by: NURSE ANESTHETIST, CERTIFIED REGISTERED

## 2017-11-29 PROCEDURE — L3650 SO 8 ABD RESTRAINT PRE OTS: HCPCS | Performed by: ORTHOPAEDIC SURGERY

## 2017-11-29 PROCEDURE — 77030017024 HC ENDCAP HUM NL1 SYNT -C: Performed by: ORTHOPAEDIC SURGERY

## 2017-11-29 PROCEDURE — 77030031139 HC SUT VCRL2 J&J -A: Performed by: ORTHOPAEDIC SURGERY

## 2017-11-29 PROCEDURE — 74011250636 HC RX REV CODE- 250/636: Performed by: ORTHOPAEDIC SURGERY

## 2017-11-29 PROCEDURE — 74011250636 HC RX REV CODE- 250/636

## 2017-11-29 PROCEDURE — 74011250637 HC RX REV CODE- 250/637: Performed by: ANESTHESIOLOGY

## 2017-11-29 PROCEDURE — 77030014405 HC GD ROD RMR SYNT -C: Performed by: ORTHOPAEDIC SURGERY

## 2017-11-29 PROCEDURE — 77030032490 HC SLV COMPR SCD KNE COVD -B: Performed by: ORTHOPAEDIC SURGERY

## 2017-11-29 PROCEDURE — 77030000031 HC BIT DRL QC SYNT -C: Performed by: ORTHOPAEDIC SURGERY

## 2017-11-29 PROCEDURE — 76010000149 HC OR TIME 1 TO 1.5 HR: Performed by: ORTHOPAEDIC SURGERY

## 2017-11-29 PROCEDURE — 76210000006 HC OR PH I REC 0.5 TO 1 HR: Performed by: ORTHOPAEDIC SURGERY

## 2017-11-29 PROCEDURE — 77030018846 HC SOL IRR STRL H20 ICUM -A: Performed by: ORTHOPAEDIC SURGERY

## 2017-11-29 PROCEDURE — 77030007327 HC GD ROD SYNT -C: Performed by: ORTHOPAEDIC SURGERY

## 2017-11-29 PROCEDURE — 77030008848 HC WRE K SYNT -B: Performed by: ORTHOPAEDIC SURGERY

## 2017-11-29 PROCEDURE — 73060 X-RAY EXAM OF HUMERUS: CPT

## 2017-11-29 PROCEDURE — 77030006618 HC IMPL BLD F/HUM SPRL NL SYNT -F: Performed by: ORTHOPAEDIC SURGERY

## 2017-11-29 PROCEDURE — 77030003666 HC NDL SPINAL BD -A: Performed by: ORTHOPAEDIC SURGERY

## 2017-11-29 PROCEDURE — 74011250637 HC RX REV CODE- 250/637: Performed by: NURSE ANESTHETIST, CERTIFIED REGISTERED

## 2017-11-29 PROCEDURE — 77030003787: Performed by: ORTHOPAEDIC SURGERY

## 2017-11-29 PROCEDURE — 76210000021 HC REC RM PH II 0.5 TO 1 HR: Performed by: ORTHOPAEDIC SURGERY

## 2017-11-29 PROCEDURE — C1713 ANCHOR/SCREW BN/BN,TIS/BN: HCPCS | Performed by: ORTHOPAEDIC SURGERY

## 2017-11-29 PROCEDURE — 77030011640 HC PAD GRND REM COVD -A: Performed by: ORTHOPAEDIC SURGERY

## 2017-11-29 PROCEDURE — 76060000033 HC ANESTHESIA 1 TO 1.5 HR: Performed by: ORTHOPAEDIC SURGERY

## 2017-11-29 PROCEDURE — 77030011265 HC ELECTRD BLD HEX COVD -A: Performed by: ORTHOPAEDIC SURGERY

## 2017-11-29 PROCEDURE — 74011000250 HC RX REV CODE- 250

## 2017-11-29 PROCEDURE — 77030018836 HC SOL IRR NACL ICUM -A: Performed by: ORTHOPAEDIC SURGERY

## 2017-11-29 PROCEDURE — 77030020782 HC GWN BAIR PAWS FLX 3M -B: Performed by: ORTHOPAEDIC SURGERY

## 2017-11-29 DEVICE — ROD GUID L230MM DIA2.5MM S STL TRCR TIP W/ STP FOR HLLW DRL: Type: IMPLANTABLE DEVICE | Status: FUNCTIONAL

## 2017-11-29 DEVICE — IMPLANTABLE DEVICE: Type: IMPLANTABLE DEVICE | Site: HUMERUS | Status: FUNCTIONAL

## 2017-11-29 DEVICE — K WIRE FIX L285MM DIA2.5MM S STL W/ TRCR PNT: Type: IMPLANTABLE DEVICE | Status: FUNCTIONAL

## 2017-11-29 DEVICE — ENDCAP ORTH 0MM EXTN TI W/ T25 STARDRV FOR HUM NAIL-EX SPRL: Type: IMPLANTABLE DEVICE | Site: HUMERUS | Status: FUNCTIONAL

## 2017-11-29 DEVICE — SCREW BNE L30MM DIA4MM TIB BLU TI ST CANN LOK FULL THRD T25: Type: IMPLANTABLE DEVICE | Site: HUMERUS | Status: FUNCTIONAL

## 2017-11-29 RX ORDER — FENTANYL CITRATE 50 UG/ML
100 INJECTION, SOLUTION INTRAMUSCULAR; INTRAVENOUS ONCE
Status: DISCONTINUED | OUTPATIENT
Start: 2017-11-29 | End: 2017-11-29 | Stop reason: HOSPADM

## 2017-11-29 RX ORDER — PROMETHAZINE HYDROCHLORIDE 25 MG/ML
12.5 INJECTION, SOLUTION INTRAMUSCULAR; INTRAVENOUS
Status: DISCONTINUED | OUTPATIENT
Start: 2017-11-29 | End: 2017-11-29 | Stop reason: HOSPADM

## 2017-11-29 RX ORDER — ONDANSETRON 2 MG/ML
4 INJECTION INTRAMUSCULAR; INTRAVENOUS ONCE
Status: DISCONTINUED | OUTPATIENT
Start: 2017-11-29 | End: 2017-11-29 | Stop reason: HOSPADM

## 2017-11-29 RX ORDER — MIDAZOLAM HYDROCHLORIDE 1 MG/ML
INJECTION, SOLUTION INTRAMUSCULAR; INTRAVENOUS AS NEEDED
Status: DISCONTINUED | OUTPATIENT
Start: 2017-11-29 | End: 2017-11-29 | Stop reason: HOSPADM

## 2017-11-29 RX ORDER — MIDAZOLAM HYDROCHLORIDE 1 MG/ML
2 INJECTION, SOLUTION INTRAMUSCULAR; INTRAVENOUS ONCE
Status: DISCONTINUED | OUTPATIENT
Start: 2017-11-29 | End: 2017-11-29 | Stop reason: HOSPADM

## 2017-11-29 RX ORDER — SODIUM CHLORIDE 0.9 % (FLUSH) 0.9 %
5-10 SYRINGE (ML) INJECTION AS NEEDED
Status: DISCONTINUED | OUTPATIENT
Start: 2017-11-29 | End: 2017-11-29 | Stop reason: HOSPADM

## 2017-11-29 RX ORDER — ROCURONIUM BROMIDE 10 MG/ML
INJECTION, SOLUTION INTRAVENOUS AS NEEDED
Status: DISCONTINUED | OUTPATIENT
Start: 2017-11-29 | End: 2017-11-29 | Stop reason: HOSPADM

## 2017-11-29 RX ORDER — NEOSTIGMINE METHYLSULFATE 5 MG/5 ML
SYRINGE (ML) INTRAVENOUS AS NEEDED
Status: DISCONTINUED | OUTPATIENT
Start: 2017-11-29 | End: 2017-11-29 | Stop reason: HOSPADM

## 2017-11-29 RX ORDER — HYDROMORPHONE HYDROCHLORIDE 2 MG/ML
INJECTION, SOLUTION INTRAMUSCULAR; INTRAVENOUS; SUBCUTANEOUS
Status: COMPLETED
Start: 2017-11-29 | End: 2017-11-29

## 2017-11-29 RX ORDER — LIDOCAINE HYDROCHLORIDE 20 MG/ML
INJECTION, SOLUTION EPIDURAL; INFILTRATION; INTRACAUDAL; PERINEURAL AS NEEDED
Status: DISCONTINUED | OUTPATIENT
Start: 2017-11-29 | End: 2017-11-29 | Stop reason: HOSPADM

## 2017-11-29 RX ORDER — KETOROLAC TROMETHAMINE 30 MG/ML
INJECTION, SOLUTION INTRAMUSCULAR; INTRAVENOUS
Status: DISCONTINUED
Start: 2017-11-29 | End: 2017-11-29 | Stop reason: HOSPADM

## 2017-11-29 RX ORDER — CEFAZOLIN SODIUM 2 G/50ML
2 SOLUTION INTRAVENOUS ONCE
Status: COMPLETED | OUTPATIENT
Start: 2017-11-29 | End: 2017-11-29

## 2017-11-29 RX ORDER — KETOROLAC TROMETHAMINE 30 MG/ML
30 INJECTION, SOLUTION INTRAMUSCULAR; INTRAVENOUS
Status: COMPLETED | OUTPATIENT
Start: 2017-11-29 | End: 2017-11-29

## 2017-11-29 RX ORDER — SUCCINYLCHOLINE CHLORIDE 20 MG/ML
INJECTION INTRAMUSCULAR; INTRAVENOUS AS NEEDED
Status: DISCONTINUED | OUTPATIENT
Start: 2017-11-29 | End: 2017-11-29 | Stop reason: HOSPADM

## 2017-11-29 RX ORDER — FAMOTIDINE 20 MG/1
20 TABLET, FILM COATED ORAL ONCE
Status: COMPLETED | OUTPATIENT
Start: 2017-11-29 | End: 2017-11-29

## 2017-11-29 RX ORDER — OXYCODONE AND ACETAMINOPHEN 10; 325 MG/1; MG/1
1 TABLET ORAL ONCE
Status: COMPLETED | OUTPATIENT
Start: 2017-11-29 | End: 2017-11-29

## 2017-11-29 RX ORDER — HYDROMORPHONE HYDROCHLORIDE 2 MG/ML
0.5 INJECTION, SOLUTION INTRAMUSCULAR; INTRAVENOUS; SUBCUTANEOUS
Status: DISCONTINUED | OUTPATIENT
Start: 2017-11-29 | End: 2017-11-29 | Stop reason: HOSPADM

## 2017-11-29 RX ORDER — EPHEDRINE SULFATE/0.9% NACL/PF 25 MG/5 ML
SYRINGE (ML) INTRAVENOUS AS NEEDED
Status: DISCONTINUED | OUTPATIENT
Start: 2017-11-29 | End: 2017-11-29 | Stop reason: HOSPADM

## 2017-11-29 RX ORDER — GLYCOPYRROLATE 0.2 MG/ML
INJECTION INTRAMUSCULAR; INTRAVENOUS AS NEEDED
Status: DISCONTINUED | OUTPATIENT
Start: 2017-11-29 | End: 2017-11-29 | Stop reason: HOSPADM

## 2017-11-29 RX ORDER — SODIUM CHLORIDE, SODIUM LACTATE, POTASSIUM CHLORIDE, CALCIUM CHLORIDE 600; 310; 30; 20 MG/100ML; MG/100ML; MG/100ML; MG/100ML
75 INJECTION, SOLUTION INTRAVENOUS CONTINUOUS
Status: DISCONTINUED | OUTPATIENT
Start: 2017-11-29 | End: 2017-11-29 | Stop reason: HOSPADM

## 2017-11-29 RX ORDER — ONDANSETRON 2 MG/ML
INJECTION INTRAMUSCULAR; INTRAVENOUS AS NEEDED
Status: DISCONTINUED | OUTPATIENT
Start: 2017-11-29 | End: 2017-11-29 | Stop reason: HOSPADM

## 2017-11-29 RX ORDER — PROPOFOL 10 MG/ML
INJECTION, EMULSION INTRAVENOUS AS NEEDED
Status: DISCONTINUED | OUTPATIENT
Start: 2017-11-29 | End: 2017-11-29 | Stop reason: HOSPADM

## 2017-11-29 RX ORDER — LIDOCAINE HYDROCHLORIDE 10 MG/ML
3 INJECTION, SOLUTION EPIDURAL; INFILTRATION; INTRACAUDAL; PERINEURAL ONCE
Status: DISCONTINUED | OUTPATIENT
Start: 2017-11-29 | End: 2017-11-29 | Stop reason: HOSPADM

## 2017-11-29 RX ORDER — SODIUM CHLORIDE 0.9 % (FLUSH) 0.9 %
5-10 SYRINGE (ML) INJECTION EVERY 8 HOURS
Status: DISCONTINUED | OUTPATIENT
Start: 2017-11-29 | End: 2017-11-29 | Stop reason: HOSPADM

## 2017-11-29 RX ORDER — ROPIVACAINE HYDROCHLORIDE 5 MG/ML
30 INJECTION, SOLUTION EPIDURAL; INFILTRATION; PERINEURAL
Status: DISCONTINUED | OUTPATIENT
Start: 2017-11-29 | End: 2017-11-29 | Stop reason: HOSPADM

## 2017-11-29 RX ORDER — FENTANYL CITRATE 50 UG/ML
INJECTION, SOLUTION INTRAMUSCULAR; INTRAVENOUS AS NEEDED
Status: DISCONTINUED | OUTPATIENT
Start: 2017-11-29 | End: 2017-11-29 | Stop reason: HOSPADM

## 2017-11-29 RX ORDER — DEXAMETHASONE SODIUM PHOSPHATE 4 MG/ML
INJECTION, SOLUTION INTRA-ARTICULAR; INTRALESIONAL; INTRAMUSCULAR; INTRAVENOUS; SOFT TISSUE AS NEEDED
Status: DISCONTINUED | OUTPATIENT
Start: 2017-11-29 | End: 2017-11-29 | Stop reason: HOSPADM

## 2017-11-29 RX ADMIN — KETOROLAC TROMETHAMINE 30 MG: 30 INJECTION, SOLUTION INTRAMUSCULAR at 15:49

## 2017-11-29 RX ADMIN — HYDROMORPHONE HYDROCHLORIDE 0.5 MG: 2 INJECTION, SOLUTION INTRAMUSCULAR; INTRAVENOUS; SUBCUTANEOUS at 15:50

## 2017-11-29 RX ADMIN — CEFAZOLIN SODIUM 2 G: 2 SOLUTION INTRAVENOUS at 14:28

## 2017-11-29 RX ADMIN — Medication 2 MG: at 15:15

## 2017-11-29 RX ADMIN — SUCCINYLCHOLINE CHLORIDE 100 MG: 20 INJECTION INTRAMUSCULAR; INTRAVENOUS at 14:37

## 2017-11-29 RX ADMIN — ROCURONIUM BROMIDE 10 MG: 10 INJECTION, SOLUTION INTRAVENOUS at 14:36

## 2017-11-29 RX ADMIN — HYDROMORPHONE HYDROCHLORIDE 0.5 MG: 2 INJECTION, SOLUTION INTRAMUSCULAR; INTRAVENOUS; SUBCUTANEOUS at 16:00

## 2017-11-29 RX ADMIN — OXYCODONE HYDROCHLORIDE AND ACETAMINOPHEN 1 TABLET: 10; 325 TABLET ORAL at 16:50

## 2017-11-29 RX ADMIN — LIDOCAINE HYDROCHLORIDE 60 MG: 20 INJECTION, SOLUTION EPIDURAL; INFILTRATION; INTRACAUDAL; PERINEURAL at 14:36

## 2017-11-29 RX ADMIN — FENTANYL CITRATE 50 MCG: 50 INJECTION, SOLUTION INTRAMUSCULAR; INTRAVENOUS at 14:36

## 2017-11-29 RX ADMIN — MIDAZOLAM HYDROCHLORIDE 2 MG: 1 INJECTION, SOLUTION INTRAMUSCULAR; INTRAVENOUS at 14:28

## 2017-11-29 RX ADMIN — PROPOFOL 50 MG: 10 INJECTION, EMULSION INTRAVENOUS at 14:56

## 2017-11-29 RX ADMIN — FAMOTIDINE 20 MG: 20 TABLET, FILM COATED ORAL at 12:41

## 2017-11-29 RX ADMIN — FENTANYL CITRATE 50 MCG: 50 INJECTION, SOLUTION INTRAMUSCULAR; INTRAVENOUS at 14:51

## 2017-11-29 RX ADMIN — SODIUM CHLORIDE, SODIUM LACTATE, POTASSIUM CHLORIDE, AND CALCIUM CHLORIDE 75 ML/HR: 600; 310; 30; 20 INJECTION, SOLUTION INTRAVENOUS at 12:41

## 2017-11-29 RX ADMIN — HYDROMORPHONE HYDROCHLORIDE 0.5 MG: 2 INJECTION, SOLUTION INTRAMUSCULAR; INTRAVENOUS; SUBCUTANEOUS at 16:20

## 2017-11-29 RX ADMIN — HYDROMORPHONE HYDROCHLORIDE 0.5 MG: 2 INJECTION, SOLUTION INTRAMUSCULAR; INTRAVENOUS; SUBCUTANEOUS at 16:10

## 2017-11-29 RX ADMIN — GLYCOPYRROLATE 0.4 MG: 0.2 INJECTION INTRAMUSCULAR; INTRAVENOUS at 15:15

## 2017-11-29 RX ADMIN — Medication 5 MG: at 14:51

## 2017-11-29 RX ADMIN — ONDANSETRON 4 MG: 2 INJECTION INTRAMUSCULAR; INTRAVENOUS at 14:45

## 2017-11-29 RX ADMIN — DEXAMETHASONE SODIUM PHOSPHATE 4 MG: 4 INJECTION, SOLUTION INTRA-ARTICULAR; INTRALESIONAL; INTRAMUSCULAR; INTRAVENOUS; SOFT TISSUE at 14:45

## 2017-11-29 RX ADMIN — PROPOFOL 150 MG: 10 INJECTION, EMULSION INTRAVENOUS at 14:36

## 2017-11-29 NOTE — ANESTHESIA POSTPROCEDURE EVALUATION
Post-Anesthesia Evaluation and Assessment    Patient: Yadiel Shen MRN: [id number]  SSN: [id number]    YOB: 1955  Age: 63 y.o. Sex: male       Cardiovascular Function/Vital Signs  Visit Vitals    /81    Pulse 74    Temp 36.3 °C (97.3 °F)    Resp 14    Ht 6' (1.829 m)    Wt 51.4 kg (113 lb 5 oz)    SpO2 96%    BMI 15.37 kg/m2       Patient is status post general, regional anesthesia for Procedure(s):  LEFT SHOULDER INTRA MEDULLARY RODDING/C-ARM/SYNTHES. Nausea/Vomiting: None    Postoperative hydration reviewed and adequate. Pain:  Pain Scale 1: Numeric (0 - 10) (11/29/17 1629)  Pain Intensity 1: 4 (11/29/17 1629)   Managed    Neurological Status:   Neuro (WDL): Within Defined Limits (11/29/17 1543)   At baseline    Mental Status and Level of Consciousness: Arousable    Pulmonary Status:   O2 Device: Room air (11/29/17 1610)   Adequate oxygenation and airway patent    Complications related to anesthesia: None    Post-anesthesia assessment completed.  No concerns    Signed By: Earl Altman MD     November 29, 2017

## 2017-11-29 NOTE — ANESTHESIA PREPROCEDURE EVALUATION
Anesthetic History   No history of anesthetic complications            Review of Systems / Medical History  Patient summary reviewed and pertinent labs reviewed    Pulmonary          Smoker         Neuro/Psych   Within defined limits           Cardiovascular  Within defined limits                     GI/Hepatic/Renal     GERD           Endo/Other  Within defined limits           Other Findings   Comments:   Risk Factors for Postoperative nausea/vomiting:       History of postoperative nausea/vomiting? NO       Female? NO       Motion sickness? NO       Intended opioid administration for postoperative analgesia? YES      Smoking Abstinence  Current Smoker? YES  Elective Surgery? NO  Seen preoperatively by anesthesiologist or proxy prior to day of surgery? YES  Pt abstained from smoking 24 hours prior to anesthesia?  YES           Physical Exam    Airway  Mallampati: II  TM Distance: 4 - 6 cm  Neck ROM: normal range of motion   Mouth opening: Normal     Cardiovascular  Regular rate and rhythm,  S1 and S2 normal,  no murmur, click, rub, or gallop             Dental    Dentition: Poor dentition     Pulmonary  Breath sounds clear to auscultation               Abdominal  Abdominal exam normal       Other Findings            Anesthetic Plan    ASA: 2  Anesthesia type: general and regional - interscalene block      Post-op pain plan if not by surgeon: peripheral nerve block single    Induction: Intravenous  Anesthetic plan and risks discussed with: Patient

## 2017-11-29 NOTE — IP AVS SNAPSHOT
Willow Mccracken 
 
 
 [address] [facility] 63 [facility] Patient: Bella Varner MRN: [id number] MBD:[birth year] About your hospitalization You were admitted on:  November 29, 2017 You last received care in the:  [facility] PHASE 2 RECOVERY You were discharged on:  November 29, 2017 Why you were hospitalized Your primary diagnosis was:  Not on File Things You Need To Do (next 8 weeks) Follow up with Estela Jenkins MD  
  
Phone:  [phone] Where:  [address] [facility], [address] [address] Follow up with Viola Fermin MD  
Follow up as scheduled Phone:  [phone] Where:  [address] [place], [address] [facility] [address], [address] [facility] [address] Wednesday Dec 06, 2017 POST OP with EMILIANO Salamanca at  2:30 PM  
Where:  [address] ([address]) Discharge Orders None A check carlos indicates which time of day the medication should be taken. My Medications TAKE these medications as instructed Instructions Each Dose to Equal  
 Morning Noon Evening Bedtime * oxyCODONE-acetaminophen 5-325 mg per tablet Commonly known as:  PERCOCET Your last dose was: Your next dose is: Take 1 Tab by mouth every six (6) hours as needed for Pain. Max Daily Amount: 4 Tabs. 1 Tab * oxyCODONE-acetaminophen 5-325 mg per tablet Commonly known as:  PERCOCET Your last dose was: Your next dose is: Take 1-2 Tabs by mouth every four (4) hours as needed for Pain. Max Daily Amount: 12 Tabs. Do not take until after surgery 1-2 Tab * Notice: This list has 2 medication(s) that are the same as other medications prescribed for you. Read the directions carefully, and ask your doctor or other care provider to review them with you. Discharge Instructions Learning About Closed Reduction of a Fractured Bone What is a closed reduction? A closed reduction is a procedure to line up the ends of a broken (fractured) bone without the need for surgery. This will help the fractured bone heal correctly. It may be done right after your injury or several days later. How is a closed reduction done? Your doctor will give you medicine to help you relax and help with pain. He or she will push or pull the ends of the fractured bone until they line up. This part of the procedure is called reduction. Then your doctor will put a cast or splint on the affected arm or leg to help hold the bone in place while it heals. The doctor will take an X-ray to check that the bone is properly lined up. What can you expect after a closed reduction? Most people go home right after the procedure. You will need someone to drive you home. You may need extra help at home, especially if you live alone or provide care for another person. You may have some mild bone pain or aching for 2 to 3 weeks. It usually takes 6 to 12 weeks for a fractured bone to heal. This depends on your age, which bone you fractured, the type of fracture you have, and how badly the bone was injured. You will need to wear a cast or splint until the bone has healed. How soon you can return to work and your normal routine depends on your job and how long it takes the bone to heal. If you have a fractured leg and you sit at work, you may be able to go back within several days. But if your job requires a lot of standing or walking, you will need to wait until your fracture has healed. Follow-up care is a key part of your treatment and safety. Be sure to make and go to all appointments, and call your doctor if you are having problems. It's also a good idea to know your test results and keep a list of the medicines you take. Where can you learn more? Go to http://ema-cristian.info/. Enter C951 in the search box to learn more about \"Learning About Closed Reduction of a Fractured Bone. \" Current as of: March 21, 2017 Content Version: 11.4 © 1547-2948 Expertcloud.de. Care instructions adapted under license by [employer] (which disclaims liability or warranty for this information). If you have questions about a medical condition or this instruction, always ask your healthcare professional. [address] any warranty or liability for your use of this information. DISCHARGE SUMMARY from Nurse PATIENT INSTRUCTIONS: 
 
 
F-face looks uneven A-arms unable to move or move unevenly S-speech slurred or non-existent T-time-call 911 as soon as signs and symptoms begin-DO NOT go Back to bed or wait to see if you get better-TIME IS BRAIN. Warning Signs of HEART ATTACK Call 911 if you have these symptoms: 
? Chest discomfort. Most heart attacks involve discomfort in the center of the chest that lasts more than a few minutes, or that goes away and comes back. It can feel like uncomfortable pressure, squeezing, fullness, or pain. ? Discomfort in other areas of the upper body. Symptoms can include pain or discomfort in one or both arms, the back, neck, jaw, or stomach. ? Shortness of breath with or without chest discomfort. ? Other signs may include breaking out in a cold sweat, nausea, or lightheadedness. Don't wait more than five minutes to call [address]! Fast action can save your life. Calling 911 is almost always the fastest way to get lifesaving treatment. Emergency Medical Services staff can begin treatment when they arrive  up to an hour sooner than if someone gets to the hospital by car. The discharge information has been reviewed with the patient and spouse. The patient and spouse verbalized understanding. 
 
___________________________________________________________________________________________________________________________________ Introducing [facility]! [employer] introduces Dashbell patient portal. Now you can access parts of your medical record, email your doctor's office, and request medication refills online. 1. In your internet browser, go to [email or web address] 2. Click on the First Time User? Click Here link in the Sign In box. You will see the New Member Sign Up page. 3. Enter your Dashbell Access Code exactly as it appears below. You will not need to use this code after youve completed the sign-up process. If you do not sign up before the expiration date, you must request a new code. · Dashbell Access Code: [email or web address] Expires: 2/24/2018  2:32 PM 
 
4. Enter the last four digits of your Social Security Number (xxxx) and Date of Birth (mm/dd/yyyy) as indicated and click Submit. You will be taken to the next sign-up page. 5. Create a Discourse Analyticst ID. This will be your Dashbell login ID and cannot be changed, so think of one that is secure and easy to remember. 6. Create a Dashbell password. You can change your password at any time. 7. Enter your Password Reset Question and Answer. This can be used at a later time if you forget your password. 8. Enter your e-mail address. You will receive e-mail notification when new information is available in [facility] 9. Click Sign Up. You can now view and download portions of your medical record. 10. Click the Download Summary menu link to download a portable copy of your medical information. If you have questions, please visit the Frequently Asked Questions section of the Dashbell website. Remember, Dashbell is NOT to be used for urgent needs. For medical emergencies, dial 911. Now available from your iPhone and Android! Unresulted Labs-Please follow up with your PCP about these lab tests Order Current Status NC XR TECHNOLOGIST SERVICE In process XR HUMERUS LT In process Providers Seen During Your Hospitalization Provider Specialty Primary office phone Onur Ramos MD Orthopedic Surgery [phone] Your Primary Care Physician (PCP) Primary Care Physician Office Phone Office Fax [address] [facility] Ewa Meza  [phone] You are allergic to the following Allergen Reactions Niacin Rash Recent Documentation Height Weight BMI Smoking Status 1.829 m 51.4 kg 15.37 kg/m2 Current Every Day Smoker Emergency Contacts Name Discharge Info Relation Home Work Mobile Kirsty Hagan DISCHARGE CAREGIVER [3] Spouse [3] [phone] Jaz Hagan DISCHARGE CAREGIVER [3] Child [2] [phone] Patient Belongings The following personal items are in your possession at time of discharge: 
  Dental Appliances: None  Visual Aid: Glasses      Home Medications: None   Jewelry: None  Clothing: Pants, Socks, Footwear, Hat (hoodie)    Other Valuables: Eyeglasses, [employer] Please provide this summary of care documentation to your next provider. Signatures-by signing, you are acknowledging that this After Visit Summary has been reviewed with you and you have received a copy. Patient Signature:  ____________________________________________________________ Date:  ____________________________________________________________  
  
Tomas Jimenez Provider Signature:  ____________________________________________________________ Date:  ____________________________________________________________

## 2017-11-29 NOTE — DISCHARGE INSTRUCTIONS
Learning About Closed Reduction of a Fractured Bone  What is a closed reduction? A closed reduction is a procedure to line up the ends of a broken (fractured) bone without the need for surgery. This will help the fractured bone heal correctly. It may be done right after your injury or several days later. How is a closed reduction done? Your doctor will give you medicine to help you relax and help with pain. He or she will push or pull the ends of the fractured bone until they line up. This part of the procedure is called reduction. Then your doctor will put a cast or splint on the affected arm or leg to help hold the bone in place while it heals. The doctor will take an X-ray to check that the bone is properly lined up. What can you expect after a closed reduction? Most people go home right after the procedure. You will need someone to drive you home. You may need extra help at home, especially if you live alone or provide care for another person. You may have some mild bone pain or aching for 2 to 3 weeks. It usually takes 6 to 12 weeks for a fractured bone to heal. This depends on your age, which bone you fractured, the type of fracture you have, and how badly the bone was injured. You will need to wear a cast or splint until the bone has healed. How soon you can return to work and your normal routine depends on your job and how long it takes the bone to heal. If you have a fractured leg and you sit at work, you may be able to go back within several days. But if your job requires a lot of standing or walking, you will need to wait until your fracture has healed. Follow-up care is a key part of your treatment and safety. Be sure to make and go to all appointments, and call your doctor if you are having problems. It's also a good idea to know your test results and keep a list of the medicines you take. Where can you learn more? Go to http://ema-cristian.info/.   Enter R859 in the search box to learn more about \"Learning About Closed Reduction of a Fractured Bone. \"  Current as of: March 21, 2017  Content Version: 11.4  © 1291-3967 [employer] Care instructions adapted under license by [employer] (which disclaims liability or warranty for this information). If you have questions about a medical condition or this instruction, always ask your healthcare professional. [place] [address] any warranty or liability for your use of this information. DISCHARGE SUMMARY from Nurse    PATIENT INSTRUCTIONS:    After general anesthesia or intravenous sedation, for 24 hours or while taking prescription Narcotics:  · Limit your activities  · Do not drive and operate hazardous machinery  · Do not make important personal or business decisions  · Do  not drink alcoholic beverages  · If you have not urinated within 8 hours after discharge, please contact your surgeon on call. Report the following to your surgeon:  · Excessive pain, swelling, redness or odor of or around the surgical area  · Temperature over 100.5  · Nausea and vomiting lasting longer than 4 hours or if unable to take medications  · Any signs of decreased circulation or nerve impairment to extremity: change in color, persistent  numbness, tingling, coldness or increase pain  · Any questions    What to do at Home:  Recommended activity: Activity as tolerated and no driving for today and No driving while on analgesics. *  Please give a list of your current medications to your Primary Care Provider. *  Please update this list whenever your medications are discontinued, doses are      changed, or new medications (including over-the-counter products) are added. *  Please carry medication information at all times in case of emergency situations.     These are general instructions for a healthy lifestyle:    No smoking/ No tobacco products/ Avoid exposure to second hand smoke  Surgeon Taylor Anton Warning:  Quitting smoking now greatly reduces serious risk to your health. Obesity, smoking, and sedentary lifestyle greatly increases your risk for illness    A healthy diet, regular physical exercise & weight monitoring are important for maintaining a healthy lifestyle    You may be retaining fluid if you have a history of heart failure or if you experience any of the following symptoms:  Weight gain of 3 pounds or more overnight or 5 pounds in a week, increased swelling in our hands or feet or shortness of breath while lying flat in bed. Please call your doctor as soon as you notice any of these symptoms; do not wait until your next office visit. Recognize signs and symptoms of STROKE:    F-face looks uneven    A-arms unable to move or move unevenly    S-speech slurred or non-existent    T-time-call 911 as soon as signs and symptoms begin-DO NOT go       Back to bed or wait to see if you get better-TIME IS BRAIN. Warning Signs of HEART ATTACK     Call 911 if you have these symptoms:   Chest discomfort. Most heart attacks involve discomfort in the center of the chest that lasts more than a few minutes, or that goes away and comes back. It can feel like uncomfortable pressure, squeezing, fullness, or pain.  Discomfort in other areas of the upper body. Symptoms can include pain or discomfort in one or both arms, the back, neck, jaw, or stomach.  Shortness of breath with or without chest discomfort.  Other signs may include breaking out in a cold sweat, nausea, or lightheadedness. Don't wait more than five minutes to call 911 - MINUTES MATTER! Fast action can save your life. Calling 911 is almost always the fastest way to get lifesaving treatment. Emergency Medical Services staff can begin treatment when they arrive -- up to an hour sooner than if someone gets to the hospital by car. The discharge information has been reviewed with the patient and spouse.   The patient and spouse verbalized understanding.    ___________________________________________________________________________________________________________________________________

## 2017-11-29 NOTE — BRIEF OP NOTE
BRIEF OPERATIVE NOTE    Date of Procedure: 11/29/2017   Preoperative Diagnosis: CLOSED DISPLACED FRACTURE OF PROXIMAL END OF LEFT HUMERUS S42.292A  Postoperative Diagnosis: CLOSED DISPLACED FRACTURE OF PROXIMAL END OF LEFT HUMERUS S42.292A    Procedure(s):  LEFT SHOULDER INTRA MEDULLARY RODDING/C-ARM/SYNTHES  Surgeon(s) and Role:     * Zhang Kelley MD - Primary         Assistant Staff:       Surgical Staff:  Circ-1: Rajwinder Aleman RN  Radiology Technician: Lior Lewis  Scrub Tech-1: [facility]  Surg Asst-1: Antwan Caldwell  Event Time In   Incision Start 1451   Incision Close      Anesthesia: General   Estimated Blood Loss: minimal  Specimens: * No specimens in log *   Findings: as above   Complications: none  Implants:   Implant Name Type Inv.  Item Serial No.  Lot No. LRB No. Used Action   BLADE SPRL F/HUM NL 46MM TI --  - [id number]  BLADE SPRL F/HUM NL 46MM TI --   SYNTHES [place] NA Left 1 Implanted   NAIL HUM TAMMI [id number] STRL -- TI EXPERT - [id number]  NAIL HUM TAMMI [id number] STRL -- TI EXPERT  SYNTHES [place] [id number] Left 1 Implanted   SCR BNE LCK T25 4X30MM TI --  - [id number]  SCR BNE LCK T25 4X30MM TI --   SYNTHES [place] NA Left 4 Implanted   ENDCAP HUM T25 STRDRV 0MM -- TI - [id number]   ENDCAP HUM T25 STRDRV 0MM -- TI   SYNTHES [place] NA Left 1 Implanted

## 2017-11-29 NOTE — PERIOP NOTES
Patient discharged from facility via wheelchair. Patient's spouse has discharge instructions in hand. Patient armband removed and shredded.

## 2017-11-30 NOTE — OP NOTES
[address]    Name:  Keren Liu  MR#:  [id number]  :  1955  Account #:  [de-identified]  Date of Adm:  2017  Date of Surgery:  2017      PREOPERATIVE DIAGNOSIS: Displaced proximal humerus fracture,  left humerus. POSTOPERATIVE DIAGNOSIS: Displaced proximal humerus fracture,  left humerus. PROCEDURE: Intramedullary rodding, proximal distal interlocking,  proximal humerus fracture, left shoulder. SURGEON: Trixie Hernandez MD    ESTIMATED BLOOD LOSS: 20 mL. COMPLICATIONS: None. SPECIMENS REMOVED: None. FINDINGS: As above. ANESTHESIA: General.    BRIEF HISTORY: The patient has had significant amount of problems  with the left arm after a fall, was consented for surgery after having  discussed at length possible risks and complications of surgery  including infection, bleeding, recurrence of pain, among other possible  problems. DESCRIPTION OF DETAIL: The patient was taken to the operating  room, induced under general endotracheal by the anesthesia staff,  placed on a radiolucent table with a bump underneath the left arm. Using a 3 cm incision over the anterolateral aspect of the acromion,  the dissection was carried to the deltoid fascia which was split in line  with the incision. The guide pin was placed at the edge of the articular  surface midline on the lateral plane, followed by a split on the edge of  the tendon was made and the proximal reamer was placed. A 9 x 280  nail was then inserted across the fracture site through a targeting  device and a 1 inch incision laterally with a guide pin on the head was  placed just inferior to the equator and a spiral blade was inserted. It was then locked. The distal locking screw was then placed by hands free  technique with image intensifier through a stab incision. A stable  configuration was achieved in this manner.  The deep fascia of the  rotator cuff was closed with 0 Vicryl, subcutaneous tissues with 2-0  Vicryl and the skin with staples. Sterile dressings were applied. The  patient tolerated the procedure well and was taken to the recovery  room without problems.         MD MAG Fabian / Bryan Bellamy  D:  11/30/2017   10:23  T:  11/30/2017   13:20  Job #:  [id number]

## 2017-12-06 ENCOUNTER — OFFICE VISIT (OUTPATIENT)
Dept: ORTHOPEDIC SURGERY | Age: 62
End: 2017-12-06

## 2017-12-06 VITALS
OXYGEN SATURATION: 98 % | HEIGHT: 72 IN | WEIGHT: 115 LBS | SYSTOLIC BLOOD PRESSURE: 130 MMHG | DIASTOLIC BLOOD PRESSURE: 89 MMHG | HEART RATE: 117 BPM | BODY MASS INDEX: 15.58 KG/M2 | TEMPERATURE: 96.8 F

## 2017-12-06 DIAGNOSIS — S42.292A OTHER CLOSED DISPLACED FRACTURE OF PROXIMAL END OF LEFT HUMERUS, INITIAL ENCOUNTER: ICD-10-CM

## 2017-12-06 DIAGNOSIS — M79.602 LEFT ARM PAIN: Primary | ICD-10-CM

## 2017-12-06 RX ORDER — OXYCODONE AND ACETAMINOPHEN 5; 325 MG/1; MG/1
1 TABLET ORAL
Qty: 40 TAB | Refills: 0 | Status: SHIPPED | OUTPATIENT
Start: 2017-12-06 | End: 2018-05-22 | Stop reason: SDUPTHER

## 2017-12-06 NOTE — PROGRESS NOTES
Cherylene Sack  1955     HISTORY OF PRESENT ILLNESS  Cherylene Sack is a 63 y.o. male who presents today for evaluation s/p left Humeral intramedullary rodding on 11/29/17. He rates his pain 5/10 today. He is doing well post operatively. His pain has gotten better since surgery, he is taking percocet occasionally for pain. Patient denies any fever, chills, chest pain, shortness of breath or calf pain. There are no new illness or injuries to report since last seen in the office. PHYSICAL EXAM:   Visit Vitals    /89    Pulse (!) 117    Temp 96.8 °F (36 °C) (Oral)    Ht 6' (1.829 m)    Wt 115 lb (52.2 kg)    SpO2 98%    BMI 15.6 kg/m2      The patient is a well-developed, well-nourished male in no acute distress. The patient is alert and oriented times three. The patient appears to be well groomed. Mood and affect are normal.  ORTHOPEDIC EXAM of right shoulder and upper arm:  Inspection: swelling and bruising of the forearm and hand  Incision, clean, dry, intact, staples in place  Range of motion: able to make a full fist, 0-20 degrees glenohumeral Abduction  ttp incisional   Stability: Stable  Strength: N/A    IMPRESSION:      ICD-10-CM ICD-9-CM    1. Left arm pain M79.602 729.5 AMB POC XRAY; HUMERUS, 2+ VIEWS   2. Other closed displaced fracture of proximal end of left humerus, initial encounter S42.292A 812.09         PLAN:   Pt doing well post operatively  Incisions looked good - staples removed today, steri strips applied  Discussed surgery at length today  Will give order to begin PT - PROM of the shoulder, AROM of the hand and wrist  Gave refill on percocet 5 mg today Patient given pain medication for short term acute pain relief. Goal is to treat patient according to above plan and to ultimately have patient off all pain medications once appropriate.  If chronic pain management is required beyond what is expected for current orthopedic problem, will refer patient to pain management.   was reviewed and will be reviewed with every medication refill request.     Patient seen and evaluated by Dr. Partha Cardenas today who agrees with treatment plan    See back in 3 weeks  Follow-up Disposition: Not on [address] [facility], Maren Berumen and Spine Specialist

## 2017-12-27 ENCOUNTER — HOSPITAL ENCOUNTER (OUTPATIENT)
Dept: PHYSICAL THERAPY | Age: 62
End: 2017-12-27

## 2018-01-11 ENCOUNTER — OFFICE VISIT (OUTPATIENT)
Dept: ORTHOPEDIC SURGERY | Age: 63
End: 2018-01-11

## 2018-01-11 VITALS
TEMPERATURE: 96.8 F | DIASTOLIC BLOOD PRESSURE: 89 MMHG | WEIGHT: 116 LBS | OXYGEN SATURATION: 90 % | BODY MASS INDEX: 15.71 KG/M2 | SYSTOLIC BLOOD PRESSURE: 119 MMHG | HEART RATE: 65 BPM | HEIGHT: 72 IN

## 2018-01-11 DIAGNOSIS — M25.512 LEFT SHOULDER PAIN, UNSPECIFIED CHRONICITY: ICD-10-CM

## 2018-01-11 DIAGNOSIS — S42.292A OTHER CLOSED DISPLACED FRACTURE OF PROXIMAL END OF LEFT HUMERUS, INITIAL ENCOUNTER: Primary | ICD-10-CM

## 2018-01-11 RX ORDER — HYDROCODONE BITARTRATE AND ACETAMINOPHEN 7.5; 325 MG/1; MG/1
1 TABLET ORAL
Qty: 40 TAB | Refills: 0 | Status: SHIPPED | OUTPATIENT
Start: 2018-01-11 | End: 2018-05-22

## 2018-01-11 NOTE — PROGRESS NOTES
Jack Cisneros  1955     HISTORY OF PRESENT ILLNESS  Jack Cisneros is a 63 y.o. male who presents today for evaluation s/p left Humeral intramedullary rodding on 11/29/17. He rates his pain 4/10 today. He is doing well post operatively. Presents today using his sling. States that he has been going without it during the day and using it at night. Patient has not yet attended PT due to transportation and scheduling issues. He has a dull pain at night after using the arm all day. Describes working on PROM himself at home because he hasn't been able to attend PT. He reports taking the Percocet 5 at night to help him sleep. He is requesting pain medication today. Patient denies any fever, chills, chest pain, shortness of breath or calf pain. There are no new illness or injuries to report since last seen in the office. PHYSICAL EXAM:   Visit Vitals    /89 (BP 1 Location: Right arm, BP Patient Position: Sitting)    Pulse 65    Temp 96.8 °F (36 °C)    Ht 6' (1.829 m)    Wt 116 lb (52.6 kg)    SpO2 90%    BMI 15.73 kg/m2      The patient is a well-developed, well-nourished male in no acute distress. The patient is alert and oriented times three. The patient appears to be well groomed. Mood and affect are normal.  ORTHOPEDIC EXAM of right shoulder and upper arm:  Inspection: no swelling, no bruising  Incision well healed  Range of motion: able to make a full fist, 0-80 degrees glenohumeral Abduction  ttp none  Stability: Stable  Strength: N/A    IMAGING: XR of the right humerus dated 1/11/18 was reviewed and read: intramedullary steve in good placement, minimal callous formation    IMPRESSION:      ICD-10-CM ICD-9-CM    1. Other closed displaced fracture of proximal end of left humerus, initial encounter S42.292A 812.09 REFERRAL TO PHYSICAL THERAPY      HYDROcodone-acetaminophen (NORCO) 7.5-325 mg per tablet   2.  Left shoulder pain, unspecified chronicity M25.512 719.41 AMB POC XRAY; HUMERUS, 2+ VIEWS        PLAN:   Pt doing well post operatively  Emphasized to patient not to do any activities to increase his pain levels. Talked about what he can and can't do and that getting to some formal PT is very important. Also talked about smoking cessation. Will give order to begin PT - PROM of the shoulder  Patient will remain in sling  Given prescription for Norco 7.5 Patient given pain medication for short term acute pain relief. Goal is to treat patient according to above plan and to ultimately have patient off all pain medications once appropriate. If chronic pain management is required beyond what is expected for current orthopedic problem, will refer patient to pain management.   was reviewed and will be reviewed with every medication refill request.       RTC 3 weeks  Follow-up Disposition: Not on File    Scribed by Nirav Gutierrez [facility]) as dictated by SESAR [address] and Spine Specialist

## 2018-01-11 NOTE — PROGRESS NOTES
Michael Wong  1955     HISTORY OF PRESENT ILLNESS  Michael Wong is a 63 y.o. male who presents today for evaluation s/p left Humeral intramedullary rodding on 11/29/17. He rates his pain 4/10 today. He is doing well post operatively. His pain has gotten better since surgery, he is taking percocet occasionally for pain. Patient denies any fever, chills, chest pain, shortness of breath or calf pain. There are no new illness or injuries to report since last seen in the office. PHYSICAL EXAM:   Visit Vitals    /89 (BP 1 Location: Right arm, BP Patient Position: Sitting)    Pulse 65    Temp 96.8 °F (36 °C)    Ht 6' (1.829 m)    Wt 116 lb (52.6 kg)    SpO2 90%    BMI 15.73 kg/m2      The patient is a well-developed, well-nourished male in no acute distress. The patient is alert and oriented times three. The patient appears to be well groomed. Mood and affect are normal.  ORTHOPEDIC EXAM of right shoulder and upper arm:  Inspection: swelling and bruising of the forearm and hand  Incision, clean, dry, intact, staples in place  Range of motion: able to make a full fist, 0-80 degrees glenohumeral Abduction  ttp incisional   Stability: Stable  Strength: N/A    IMPRESSION:      ICD-10-CM ICD-9-CM    1. Left shoulder pain, unspecified chronicity M25.512 719.41 AMB POC XRAY; HUMERUS, 2+ VIEWS   2. Other closed displaced fracture of proximal end of left humerus, initial encounter S42.292A 812.09         PLAN:   Pt doing well post operatively  Incisions looked good - staples removed today, steri strips applied  Discussed surgery at length today  Will give order to begin PT - PROM of the shoulder, AROM of the hand and wrist  Gave refill on percocet 5 mg today Patient given pain medication for short term acute pain relief. Goal is to treat patient according to above plan and to ultimately have patient off all pain medications once appropriate.  If chronic pain management is required beyond what is expected for current orthopedic problem, will refer patient to pain management.   was reviewed and will be reviewed with every medication refill request.       See back in 3 weeks  [facility]  [address] [place] [address] and Spine Specialist

## 2018-01-22 ENCOUNTER — APPOINTMENT (OUTPATIENT)
Dept: PHYSICAL THERAPY | Age: 63
End: 2018-01-22

## 2018-01-25 ENCOUNTER — HOSPITAL ENCOUNTER (OUTPATIENT)
Dept: PHYSICAL THERAPY | Age: 63
Discharge: HOME | End: 2018-01-25
Payer: COMMERCIAL

## 2018-01-25 PROCEDURE — 97140 MANUAL THERAPY 1/> REGIONS: CPT

## 2018-01-25 PROCEDURE — 97110 THERAPEUTIC EXERCISES: CPT

## 2018-01-25 PROCEDURE — 97162 PT EVAL MOD COMPLEX 30 MIN: CPT

## 2018-01-25 NOTE — PROGRESS NOTES
[facility] PT DAILY TREATMENT NOTE/SHOULDER EVAL 3-16    Patient Name: Jack Cisneros  Date:2018  : 1955  [x]  Patient  Verified  Payor: Nicky Elizondo / Plan: [employer] / Product Type: HMO /    In time:1010  Out time:1100  Total Treatment Time (min): 50  Total Timed Codes (min): 45  1:1 Treatment Time ( only): 48   Visit #: 1 of 1-3    Treatment Area: Left upper arm pain [M79.622]    SUBJECTIVE  Pain Level (0-10 scale): 0/10  []constant []intermittent []improving []worsening []no change since onset    Any medication changes, allergies to medications, adverse drug reactions, diagnosis change, or new procedure performed?: [x] No    [] Yes (see summary sheet for update)  Subjective functional status/changes:     PLOF: Retired/  Limitations to PLOF: none  Mechanism of Injury: fall  Current symptoms/Complaints: dec ROM  Previous Treatment/Compliance: none  PMHx/Surgical Hx: leg fx  Work Hx: retired machinest  Living Situation: Lives with wife  Pt Goals: ROM and decrease pain  Barriers: []pain []financial []time []transportation []other  Motivation: yes  Substance use: []Alcohol []Tobacco []other:   FABQ Score: []low []elevate  Cognition: A & O x 4    Other:    OBJECTIVE/EXAMINATION  Domestic Life: indep  Activity/Recreational Limitations: indep  Mobility: indep  Self Care: indep        12 min []Eval                  []Re-Eval       25 min Therapeutic Exercise:  [] See flow sheet :   Rationale: increase ROM and increase strength to improve the patients ability to ease with ADL's    8 min Manual Therapy:  PROM   Rationale: increase ROM to ease with ADL's       With   [] TE   [] TA   [] neuro   [] other: Patient Education: [x] Review HEP    [] Progressed/Changed HEP based on:   [] positioning   [] body mechanics   [] transfers   [] heat/ice application    [] other:      Other Objective/Functional Measures: PROM nanwmrk=247 deg, scaption= 115 deg    Physical Therapy Evaluation - Shoulder    Posture: [] Poor [x] Fair    [] Good    Describe:    ROM:  [] Unable to assess at this time                                           AROM                                                              PROM   Left Right  Left Right   Flexion   Flexion 135    Extension   Extension     Scaption/ABD   Scaptin/    ER @ 0 Degrees   ER @ 0 Degrees     ER @ 90 Degrees   ER @ 90 Degrees     IR @ 90 Degrees   IR @ 90 Degrees       End Feel / Painful Arc:    Strength:   [] Unable to assess at this time                                                                            L (1-5) R (1-5) Pain   Flexors   [] Yes   [] No   Abductors   [] Yes   [] No   External Rotators   [] Yes   [] No   Internal Rotators   [] Yes   [] No   Supraspinatus   [] Yes   [] No   Serratus Anterior   [] Yes   [] No   Lower Trapezius   [] Yes   [] No   Elbow Flexion   [] Yes   [] No   Elbow Extension   [] Yes   [] No       Scapulohumoral Control / Rhythm:  Able to eccentrically lower with good control? Left: [] Yes   [x] No     Right: [] Yes   [] No    Accessory Motions:    Palpation  [] Min  [x] Mod  [] Severe    Location: UT R and L  [] Min  [x] Mod  [] Severe    Location:  [] Min  [] Mod  [] Severe    Location:    Other Tests / Comments:        Pain Level (0-10 scale) post treatment: 0/10    ASSESSMENT/Changes in Function: see poc    Patient will continue to benefit from skilled PT services to modify and progress therapeutic interventions, address functional mobility deficits, address ROM deficits, address strength deficits, analyze and address soft tissue restrictions, analyze and modify body mechanics/ergonomics and assess and modify postural abnormalities to attain remaining goals.      [x]  See Plan of Care  []  See progress note/recertification  []  See Discharge Summary         Progress towards goals / Updated goals:  See POC    PLAN  [x]  Upgrade activities as tolerated     [x]  Continue plan of care  []  Update interventions per flow sheet       [] Discharge due to:_  []  Other:_      Erica Tyson, PT 1/25/2018  10:17 AM

## 2018-01-25 NOTE — PROGRESS NOTES
[facility]  [address] [facility]  [phone] fax    Plan of Care/ Statement of Necessity for Physical Therapy Services    Patient name: Josefa New Start of Care: 2018   Referral source: Katy Walker,* : 1955    Medical Diagnosis: Left upper arm pain [M79.622]   Onset Date:17    Treatment Diagnosis: Left Shoulder Fracture   Prior Hospitalization: see medical history Provider#: [id number]   Medications: Verified on Patient summary List    Comorbidities: Tobacco Use, Alcohol Use   Prior Level of Function: Retired [address]. Active with Household/Yard chore. The Plan of Care and following information is based on the information from the initial evaluation. Assessment/ key information: Pt is a 63 yr old male who reported he had a fall 17. Pt is sp Left humeral Intramedullary [address] 17. Pt reports he does not have to wear his sling anymore and his pain is much less now. He has been working on PROM at home and taking meds as needed. PROM azvfxaq=953 deg, amzyfukb=283 deg. MMT grossly 3-/5. There is no swelling and incision is intact. Pt requested a HEP due to being on a fixed income and high co-pay/deductable. Pt was educated on specifics and importance of gradually progressing his ex's, while maintaining integrity of his left shoulder. Pt requested Evaluation with HEP. Evaluation Complexity History HIGH Complexity :3+ comorbidities / personal factors will impact the outcome/ POC ; Examination MEDIUM Complexity : 3 Standardized tests and measures addressing body structure, function, activity limitation and / or participation in recreation  ;Presentation MEDIUM Complexity : Evolving with changing characteristics  ; Clinical Decision Making MEDIUM Complexity : FOTO score of 26-74  Overall Complexity Rating: MEDIUM  Problem List: pain affecting function, decrease ROM, decrease strength, impaired gait/ balance, decrease ADL/ functional abilitiies, decrease activity tolerance, decrease flexibility/ joint mobility and decrease transfer abilities   Treatment Plan may include any combination of the following: Therapeutic exercise, Therapeutic activities, Neuromuscular re-education, Physical agent/modality, Gait/balance training, Manual therapy, Patient education, Self Care training, Functional mobility training, Home safety training and Stair training  Patient / Family readiness to learn indicated by: asking questions, trying to perform skills and interest  Persons(s) to be included in education: patient (P)  Barriers to Learning/Limitations: None  Patient Goal (s): To get exercises for HEP  Patient Self Reported Health Status: fair  Rehabilitation Potential: good    Short Term Goals: To be accomplished in 1 treatments:   1. Pt will obtain a HEP for progression of shoulder function, increase ROM, strength and return to ADL's   Frequency / Duration: Patient to be seen 1 times per week for 2 weeks. Patient/ CarPatient/ Caregiver education and instruction: Diagnosis, prognosis, exercises   [x]  Plan of care has been reviewed with TASHA Torres, PT 1/25/2018 2:37 PM    ________________________________________________________________________    I certify that the above Therapy Services are being furnished while the patient is under my care. I agree with the treatment plan and certify that this therapy is necessary.     Physician's Signature:____________________  Date:____________Time: _________    Please sign and return to [facility]  [facility]  [address] [place] [address]  [phone] fax

## 2018-05-14 NOTE — ANCILLARY DISCHARGE INSTRUCTIONS
[facility]  [place]  [address] [place] [address]  [phone] fax    Physical Therapy Discharge Summary      Patient name: Shala Bryant Start of Care: 2018   Referral source: Chris Boyle,* : 1955                          Medical Diagnosis: Left upper arm pain [M79.622] Onset Date:17                          Treatment Diagnosis: Left Shoulder Fracture   Prior Hospitalization: see medical history Provider#: [id number]   Medications: Verified on Patient summary List    Comorbidities: Tobacco Use, Alcohol Use   Prior Level of Function: Retired [address]. Active with Household/Yard chore. Visits from Start of Care: 1 of 1    Missed Visits: 0        Summary of Care:               1. Pt will obtain a HEP for progression of shoulder function, increase ROM, strength and return to ADL's   MET.     ASSESSMENT/RECOMMENDATIONS:  [x]Discontinue therapy: []Patient has reached or is progressing toward set goals      []Patient is non-compliant or has abdicated      []Due to lack of appreciable progress towards set goals    Rigoberto Nath, PT 2018 2:46 PM

## 2018-05-22 ENCOUNTER — OFFICE VISIT (OUTPATIENT)
Dept: ORTHOPEDIC SURGERY | Age: 63
End: 2018-05-22

## 2018-05-22 VITALS
RESPIRATION RATE: 16 BRPM | SYSTOLIC BLOOD PRESSURE: 149 MMHG | HEART RATE: 93 BPM | DIASTOLIC BLOOD PRESSURE: 94 MMHG | WEIGHT: 124.2 LBS | HEIGHT: 72 IN | BODY MASS INDEX: 16.82 KG/M2 | TEMPERATURE: 96.5 F

## 2018-05-22 DIAGNOSIS — M25.512 LEFT SHOULDER PAIN, UNSPECIFIED CHRONICITY: ICD-10-CM

## 2018-05-22 DIAGNOSIS — S42.292A OTHER CLOSED DISPLACED FRACTURE OF PROXIMAL END OF LEFT HUMERUS, INITIAL ENCOUNTER: Primary | ICD-10-CM

## 2018-05-22 DIAGNOSIS — M79.602 LEFT ARM PAIN: ICD-10-CM

## 2018-05-22 RX ORDER — MELOXICAM 15 MG/1
15 TABLET ORAL
Qty: 30 TAB | Refills: 1 | Status: SHIPPED | OUTPATIENT
Start: 2018-05-22 | End: 2019-12-04

## 2018-05-22 RX ORDER — CYCLOBENZAPRINE HCL 10 MG
10 TABLET ORAL
Qty: 60 TAB | Refills: 0 | Status: SHIPPED | OUTPATIENT
Start: 2018-05-22 | End: 2019-12-04

## 2018-05-22 RX ORDER — IBUPROFEN 200 MG
TABLET ORAL
COMMUNITY
End: 2019-12-04

## 2018-05-22 NOTE — PROGRESS NOTES
Jie Rai  1955   Chief Complaint   Patient presents with    Shoulder Pain     L SHOULDER PAIN F/O APPT. HISTORY OF PRESENT ILLNESS  Jie Rai is a 63 y.o. male who presents today for reevaluation of left shoulder pain. Patient rates pain as 8/10 today. s/p left Humeral intramedullary rodding on 11/29/17. Patient was only seen in office 2 times postoperatively. He went to a PT eval only in the end of January 2018. Patient had a fall three weeks ago. He reports that he has been taking it easy and tried to use the sling. Describes constant pain and no improvement since the fall. Patient states that before the fall he was able to get the arm overhead and had good ROM. The only pain was when reaching overhead. Now he reports pain with any movement reaching the arm away from himself. Patient's wife recalls seeing a bump from the screw before the fall. Did not have tenderness over the bump. Patient denies any fever, chills, chest pain, shortness of breath or calf pain. There are no new illness or injuries to report since last seen in the office. There are no changes to medications, allergies, family or social history. PHYSICAL EXAM:   Visit Vitals    BP (!) 149/94    Pulse 93    Temp 96.5 °F (35.8 °C) (Oral)    Resp 16    Ht 6' (1.829 m)    Wt 124 lb 3.2 oz (56.3 kg)    BMI 16.84 kg/m2     The patient is a well-developed, well-nourished male   in no acute distress. The patient is alert and oriented times three. The patient is alert and oriented times three. Mood and affect are normal.  LYMPHATIC: lymph nodes are not enlarged and are within normal limits  SKIN: normal in color and non tender to palpation. There are no bruises or abrasions noted. NEUROLOGICAL: Motor sensory exam is within normal limits. Reflexes are equal bilaterally.  There is normal sensation to pinprick and light touch  MUSCULOSKELETAL:  Examination Left shoulder   Skin Intact   AC joint tenderness -   Biceps tenderness -   Forward flexion/Elevation    Active abduction ROM 90   Glenohumeral abduction 70   External rotation ROM 20   Internal rotation ROM 20   Apprehension -   Cristals Relocation -   Jerk -   Load and Shift -   Obriens -   Speeds -   Impingement sign -   Supraspinatus/Empty Can +, 4/5   External Rotation Strength -, 5/5   Lift Off/Belly Press -, 5/5   Neurovascular Intact   Muscle atrophy   Prominence anterior aspect of shoulder, tenderness to palpation (palpable screw)    IMAGING:   XR of the left shoulder dated 5/22/18 was reviewed and read: hardware in place, callus formation on proximal humerus fracture, possible loosening of proximal screw      IMPRESSION:      ICD-10-CM ICD-9-CM    1. Other closed displaced fracture of proximal end of left humerus, initial encounter S42.292A 812.09 meloxicam (MOBIC) 15 mg tablet      cyclobenzaprine (FLEXERIL) 10 mg tablet   2. Left arm pain M79.602 729.5 AMB POC XRAY; HUMERUS, 2+ VIEWS   3. Left shoulder pain, unspecified chronicity M25.512 719.41         PLAN:   1. Recommend continuing to focus on mobility, PROM. May consider removing hardware if he continues to have pain. Concerns for rotator cuff tear, however due to his smoking history and overall health we will see how he does with conservative treatment. Will consider further testing. Risk factors include: smoker  2. No ultrasound exam indicated today  3. No cortisone injection indicated today   4. No Physical/Occupational Therapy indicated today  5. No diagnostic test indicated today:   6. No durable medical equipment indicated today  7. No referral indicated today   8. Yes medications indicated today: MOBIC, FLEXERIL  9.  No Narcotic indicated today       RTC 2-3 weeks for XR  Follow-up Disposition: Not on File    Scribed by [facility]) as dictated by SESAR Shields [address] and Spine Specialist

## 2018-06-12 ENCOUNTER — OFFICE VISIT (OUTPATIENT)
Dept: ORTHOPEDIC SURGERY | Age: 63
End: 2018-06-12

## 2018-06-12 VITALS
HEIGHT: 72 IN | BODY MASS INDEX: 16.66 KG/M2 | TEMPERATURE: 97.9 F | WEIGHT: 123 LBS | OXYGEN SATURATION: 96 % | DIASTOLIC BLOOD PRESSURE: 83 MMHG | SYSTOLIC BLOOD PRESSURE: 134 MMHG | HEART RATE: 97 BPM | RESPIRATION RATE: 16 BRPM

## 2018-06-12 DIAGNOSIS — S42.292P OTHER CLOSED DISPLACED FRACTURE OF PROXIMAL END OF LEFT HUMERUS WITH MALUNION, SUBSEQUENT ENCOUNTER: Primary | ICD-10-CM

## 2018-06-12 NOTE — PROCEDURES
Two views of the left humerus show a steve in excellent position. The fracture site is starting to see healing callus.

## 2018-06-12 NOTE — PROGRESS NOTES
Leonarda Odell  1955   Chief Complaint   Patient presents with    Shoulder Pain     Left shoulder pain        HISTORY OF PRESENT ILLNESS  Leonarda Odell is a 66 y.o. male who presents today for reevaluation of left shoulder pain. Patient rates pain as 5/10 today. s/p left Humeral intramedullary rodding on 11/29/17. Patient was only seen in office 2 times postoperatively. He went to a PT eval only in the end of January 2018. Patient had a fall the end of April. Patient has minimal discomfort today. He has been using his arm more and more. Patient denies any fever, chills, chest pain, shortness of breath or calf pain. There are no new illness or injuries to report since last seen in the office. There are no changes to medications, allergies, family or social history. PHYSICAL EXAM:   Visit Vitals    /83 (BP 1 Location: Right arm, BP Patient Position: Sitting)    Pulse 97    Temp 97.9 °F (36.6 °C) (Oral)    Resp 16    Ht 6' (1.829 m)    Wt 123 lb (55.8 kg)    SpO2 96%    BMI 16.68 kg/m2     The patient is a well-developed, well-nourished male   in no acute distress. The patient is alert and oriented times three. The patient is alert and oriented times three. Mood and affect are normal.  LYMPHATIC: lymph nodes are not enlarged and are within normal limits  SKIN: normal in color and non tender to palpation. There are no bruises or abrasions noted. NEUROLOGICAL: Motor sensory exam is within normal limits. Reflexes are equal bilaterally.  There is normal sensation to pinprick and light touch  MUSCULOSKELETAL:  Examination Left shoulder   Skin Intact   AC joint tenderness -   Biceps tenderness -   Forward flexion/Elevation    Active abduction ROM 90   Glenohumeral abduction 70   External rotation ROM 20   Internal rotation ROM 20   Apprehension -   Cristals Relocation -   Jerk -   Load and Shift -   Obriens -   Speeds -   Impingement sign -   Supraspinatus/Empty Can +, 4/5   External Rotation Strength -, 5/5   Lift Off/Belly Press -, 5/5   Neurovascular Intact   Muscle atrophy   Prominence anterior aspect of shoulder, no ttp on prominent screw    IMAGING:   XR of the left shoulder dated 6/12/18 was reviewed and read: hardware in place, callus formation on proximal humerus fracture, possible loosening of proximal screw      IMPRESSION:      ICD-10-CM ICD-9-CM    1. Other closed displaced fracture of proximal end of left humerus with malunion, subsequent encounter S42.292P 733.81 AMB POC XRAY; HUMERUS, 2+ VIEWS        PLAN:   1. Recommend continuing to focus on mobility. No surgical indications at this time. Will cont with all activities as tolerated. Risk factors include: smoker  2. No ultrasound exam indicated today  3. No cortisone injection indicated today   4. No Physical/Occupational Therapy indicated today  5. No diagnostic test indicated today:   6. No durable medical equipment indicated today  7. No referral indicated today   8. Yes medications indicated today:MOBIC as needed  9.  No Narcotic indicated today       RTC 2-3 weeks for XR       SESAR Hahn [address] and Spine Specialist

## 2018-08-07 ENCOUNTER — OFFICE VISIT (OUTPATIENT)
Dept: FAMILY MEDICINE CLINIC | Age: 63
End: 2018-08-07

## 2018-08-07 VITALS
RESPIRATION RATE: 18 BRPM | BODY MASS INDEX: 15.58 KG/M2 | TEMPERATURE: 98.4 F | DIASTOLIC BLOOD PRESSURE: 90 MMHG | OXYGEN SATURATION: 92 % | HEART RATE: 114 BPM | WEIGHT: 115 LBS | SYSTOLIC BLOOD PRESSURE: 130 MMHG | HEIGHT: 72 IN

## 2018-08-07 DIAGNOSIS — Z12.11 SCREENING FOR COLORECTAL CANCER: ICD-10-CM

## 2018-08-07 DIAGNOSIS — Z11.59 NEED FOR HEPATITIS C SCREENING TEST: ICD-10-CM

## 2018-08-07 DIAGNOSIS — Z00.00 PHYSICAL EXAM: Primary | ICD-10-CM

## 2018-08-07 DIAGNOSIS — Z12.12 SCREENING FOR COLORECTAL CANCER: ICD-10-CM

## 2018-08-07 NOTE — PATIENT INSTRUCTIONS
High Blood Pressure: Care Instructions  Your Care Instructions    If your blood pressure is usually above 130/80, you have high blood pressure, or hypertension. That means the top number is 130 or higher or the bottom number is 80 or higher, or both. Despite what a lot of people think, high blood pressure usually doesn't cause headaches or make you feel dizzy or lightheaded. It usually has no symptoms. But it does increase your risk for heart attack, stroke, and kidney or eye damage. The higher your blood pressure, the more your risk increases. Your doctor will give you a goal for your blood pressure. Your goal will be based on your health and your age. Lifestyle changes, such as eating healthy and being active, are always important to help lower blood pressure. You might also take medicine to reach your blood pressure goal.  Follow-up care is a key part of your treatment and safety. Be sure to make and go to all appointments, and call your doctor if you are having problems. It's also a good idea to know your test results and keep a list of the medicines you take. How can you care for yourself at home? Medical treatment  · If you stop taking your medicine, your blood pressure will go back up. You may take one or more types of medicine to lower your blood pressure. Be safe with medicines. Take your medicine exactly as prescribed. Call your doctor if you think you are having a problem with your medicine. · Talk to your doctor before you start taking aspirin every day. Aspirin can help certain people lower their risk of a heart attack or stroke. But taking aspirin isn't right for everyone, because it can cause serious bleeding. · See your doctor regularly. You may need to see the doctor more often at first or until your blood pressure comes down. · If you are taking blood pressure medicine, talk to your doctor before you take decongestants or anti-inflammatory medicine, such as ibuprofen.  Some of these medicines can raise blood pressure. · Learn how to check your blood pressure at home. Lifestyle changes  · Stay at a healthy weight. This is especially important if you put on weight around the waist. Losing even 10 pounds can help you lower your blood pressure. · If your doctor recommends it, get more exercise. Walking is a good choice. Bit by bit, increase the amount you walk every day. Try for at least 30 minutes on most days of the week. You also may want to swim, bike, or do other activities. · Avoid or limit alcohol. Talk to your doctor about whether you can drink any alcohol. · Try to limit how much sodium you eat to less than 2,300 milligrams (mg) a day. Your doctor may ask you to try to eat less than 1,500 mg a day. · Eat plenty of fruits (such as bananas and oranges), vegetables, legumes, whole grains, and low-fat dairy products. · Lower the amount of saturated fat in your diet. Saturated fat is found in animal products such as milk, cheese, and meat. Limiting these foods may help you lose weight and also lower your risk for heart disease. · Do not smoke. Smoking increases your risk for heart attack and stroke. If you need help quitting, talk to your doctor about stop-smoking programs and medicines. These can increase your chances of quitting for good. When should you call for help? Call 911 anytime you think you may need emergency care. This may mean having symptoms that suggest that your blood pressure is causing a serious heart or blood vessel problem. Your blood pressure may be over 180/110.   For example, call 911 if:    · You have symptoms of a heart attack. These may include:  ¨ Chest pain or pressure, or a strange feeling in the chest.  ¨ Sweating. ¨ Shortness of breath. ¨ Nausea or vomiting. ¨ Pain, pressure, or a strange feeling in the back, neck, jaw, or upper belly or in one or both shoulders or arms. ¨ Lightheadedness or sudden weakness.   ¨ A fast or irregular heartbeat.     · You have symptoms of a stroke. These may include:  ¨ Sudden numbness, tingling, weakness, or loss of movement in your face, arm, or leg, especially on only one side of your body. ¨ Sudden vision changes. ¨ Sudden trouble speaking. ¨ Sudden confusion or trouble understanding simple statements. ¨ Sudden problems with walking or balance. ¨ A sudden, severe headache that is different from past headaches.     · You have severe back or belly pain.    Do not wait until your blood pressure comes down on its own. Get help right away.   Call your doctor now or seek immediate care if:    · Your blood pressure is much higher than normal (such as 180/110 or higher), but you don't have symptoms.     · You think high blood pressure is causing symptoms, such as:  ¨ Severe headache. ¨ Blurry vision.    Watch closely for changes in your health, and be sure to contact your doctor if:    · Your blood pressure measures 140/90 or higher at least 2 times. That means the top number is 140 or higher or the bottom number is 90 or higher, or both.     · You think you may be having side effects from your blood pressure medicine.     · Your blood pressure is usually normal, but it goes above normal at least 2 times. Where can you learn more? Go to http://ema-cristian.info/. Enter C336 in the search box to learn more about \"High Blood Pressure: Care Instructions. \"  Current as of: December 6, 2017  Content Version: 11.7  © 6075-3593 "[employer]. Care instructions adapted under license by [employer] (which disclaims liability or warranty for this information). If you have questions about a medical condition or this instruction, always ask your healthcare professional. [address] any warranty or liability for your use of this information.            Body Mass Index: Care Instructions  Your Care Instructions    Body mass index (BMI) can help you see if your weight is raising your risk for health problems. It uses a formula to compare how much you weigh with how tall you are. · A BMI lower than 18.5 is considered underweight. · A BMI between 18.5 and 24.9 is considered healthy. · A BMI between 25 and 29.9 is considered overweight. A BMI of 30 or higher is considered obese. If your BMI is in the normal range, it means that you have a lower risk for weight-related health problems. If your BMI is in the overweight or obese range, you may be at increased risk for weight-related health problems, such as high blood pressure, heart disease, stroke, arthritis or joint pain, and diabetes. If your BMI is in the underweight range, you may be at increased risk for health problems such as fatigue, lower protection (immunity) against illness, muscle loss, bone loss, hair loss, and hormone problems. BMI is just one measure of your risk for weight-related health problems. You may be at higher risk for health problems if you are not active, you eat an unhealthy diet, or you drink too much alcohol or use tobacco products. Follow-up care is a key part of your treatment and safety. Be sure to make and go to all appointments, and call your doctor if you are having problems. It's also a good idea to know your test results and keep a list of the medicines you take. How can you care for yourself at home? · Practice healthy eating habits. This includes eating plenty of fruits, vegetables, whole grains, lean protein, and low-fat dairy. · If your doctor recommends it, get more exercise. Walking is a good choice. Bit by bit, increase the amount you walk every day. Try for at least 30 minutes on most days of the week. · Do not smoke. Smoking can increase your risk for health problems. If you need help quitting, talk to your doctor about stop-smoking programs and medicines. These can increase your chances of quitting for good. · Limit alcohol to 2 drinks a day for men and 1 drink a day for women. Too much alcohol can cause health problems. If you have a BMI higher than 25  · Your doctor may do other tests to check your risk for weight-related health problems. This may include measuring the distance around your waist. A waist measurement of more than 40 inches in men or 35 inches in women can increase the risk of weight-related health problems. · Talk with your doctor about steps you can take to stay healthy or improve your health. You may need to make lifestyle changes to lose weight and stay healthy, such as changing your diet and getting regular exercise. If you have a BMI lower than 18.5  · Your doctor may do other tests to check your risk for health problems. · Talk with your doctor about steps you can take to stay healthy or improve your health. You may need to make lifestyle changes to gain or maintain weight and stay healthy, such as getting more healthy foods in your diet and doing exercises to build muscle. Where can you learn more? Go to http://ema-cristian.info/. Enter S176 in the search box to learn more about \"Body Mass Index: Care Instructions. \"  Current as of: October 13, 2016  Content Version: 11.4  © 2269-9949 [employer]. Care instructions adapted under license by [employer] (which disclaims liability or warranty for this information). If you have questions about a medical condition or this instruction, always ask your healthcare professional. [address] any warranty or liability for your use of this information.

## 2018-08-07 NOTE — PROGRESS NOTES
Patient is in the office today for a complete physical.    1. Have you been to the ER, urgent care clinic since your last visit? Hospitalized since your last visit? No    2. Have you seen or consulted any other health care providers outside of the [address] [place] [address] since your last visit? Include any pap smears or colon screening.  No

## 2018-08-07 NOTE — PROGRESS NOTES
Subjective:   Terrance Anderson is a 66 y.o. male presenting for his annual checkup. ROS:  Feeling well. No dyspnea or chest pain on exertion. No abdominal pain, change in bowel habits, black or bloody stools. No urinary tract or prostatic symptoms. No neurological complaints. Specific concerns today: pt encouraged to stop tobacco use for his overall health. He is also using alcohol on a regular basis. Concerned this may be reason for his elevated LFT's in the past and low BMI. Pt encouraged to cut back ETOH use. Patient Active Problem List   Diagnosis Code    GERD (gastroesophageal reflux disease) K21.9    Sinusitis J32.9    ED (erectile dysfunction) N52.9    DJD (degenerative joint disease) of knee M17.10    Fall from standing W19. [place]    Rib pain on left side R07.81     Current Outpatient Prescriptions   Medication Sig Dispense Refill    ibuprofen (MOTRIN) 200 mg tablet Take  by mouth.  meloxicam (MOBIC) 15 mg tablet Take 1 Tab by mouth daily (with breakfast). 30 Tab 1    cyclobenzaprine (FLEXERIL) 10 mg tablet Take 1 Tab by mouth three (3) times daily as needed for Muscle Spasm(s). 60 Tab 0         Objective:     Visit Vitals    /90    Pulse (!) 114    Temp 98.4 °F (36.9 °C) (Oral)    Resp 18    Ht 6' (1.829 m)    Wt 115 lb (52.2 kg)    SpO2 92%    BMI 15.6 kg/m2     The patient appears well, alert, oriented x 3, in no distress. ENT normal.  Neck supple. No adenopathy or thyromegaly. CONNER. Lungs are clear, good air entry, no wheezes, rhonchi or rales. S1 and S2 normal, no murmurs, regular rate and rhythm. Abdomen is soft without tenderness, guarding, mass or organomegaly. Extremities show no edema, normal peripheral pulses. Neurological is normal without focal findings. Assessment/Plan:   healthy adult male  increase physical activity, stop smoking (advice and handout given). Pt to cut back ETOH use and increase Ensure.  Labs ordered     ICD-10-CM ICD-9-CM 1. Physical exam Z00.00 V70.9 LIPID PANEL      METABOLIC PANEL, COMPREHENSIVE      CBC WITH AUTOMATED DIFF      PSA, DIAGNOSTIC (PROSTATE SPECIFIC AG)      LIPID PANEL      METABOLIC PANEL, COMPREHENSIVE      CBC WITH AUTOMATED DIFF      PSA, DIAGNOSTIC (PROSTATE SPECIFIC AG)   2. Need for hepatitis C screening test Z11.59 V73.89 HEPATITIS C AB      HEPATITIS C AB   3. Screening for colorectal cancer Z12.11 V76.51 REFERRAL TO GASTROENTEROLOGY    Z12.12         Reviewed plan of care. Patient has provided input and agrees with goals. Discussed the patient's BMI with him. The BMI follow up plan is as follows:     weight gain advised  nutrition/feeding management  dietary management education, guidance, and counseling    An After Visit Summary was printed and given to the patient.

## 2018-08-07 NOTE — MR AVS SNAPSHOT
[facility] 
 
 
 [address] [address] [address] [address] 
[phone] Patient: Yadiel Shen MRN:  [id number] Visit Information Date & Time Provider Department Dept. Phone Encounter #  
 8/7/2018 10:00 AM Dayan Kay, [address] [place] [address] [phone] [id number] Follow-up Instructions Return in about 1 year (around 8/7/2019) for physical.  
  
Upcoming Health Maintenance Date Due Hepatitis C Screening 1955 Pneumococcal 19-64 Medium Risk (1 of 1 - PPSV23) 6/1/1976 ZOSTER VACCINE AGE 60> 4/1/2017 Influenza Age 5 to Adult 8/1/2018 COLONOSCOPY 5/29/2023 DTaP/Tdap/Td series (2 - Td) 11/26/2027 Allergies as of 8/7/2018  Review Complete On: 8/7/2018 By: Dayan Kay MD  
  
 Severity Noted Reaction Type Reactions Niacin  11/10/2010    Rash Current Immunizations  Reviewed on 7/30/2012 Name Date Tdap 11/26/2017  2:35 PM  
  
 Not reviewed this visit You Were Diagnosed With   
  
 Codes Comments Physical exam    -  Primary ICD-10-CM: Z00.00 ICD-9-CM: V70.9 Need for hepatitis C screening test     ICD-10-CM: Z11.59 
ICD-9-CM: V73.89 Screening for colorectal cancer     ICD-10-CM: Z12.11, Z12.12 
ICD-9-CM: V76.51 Vitals BP Pulse Temp Resp Height(growth percentile) Weight(growth percentile) 130/90 (!) 114 98.4 °F (36.9 °C) (Oral) 18 6' (1.829 m) 115 lb (52.2 kg) SpO2 BMI Smoking Status 92% 15.6 kg/m2 Current Every Day Smoker Vitals History BMI and BSA Data Body Mass Index Body Surface Area  
 15.6 kg/m 2 1.63 m 2 Preferred Pharmacy Pharmacy Name Phone [address], [address] [place] [address] [phone] Your Updated Medication List  
  
   
This list is accurate as of 8/7/18 10:26 AM.  Always use your most recent med list.  
  
  
  
  
 cyclobenzaprine 10 mg tablet Commonly known as:  FLEXERIL Take 1 Tab by mouth three (3) times daily as needed for Muscle Spasm(s). ibuprofen 200 mg tablet Commonly known as:  MOTRIN Take  by mouth.  
  
 meloxicam 15 mg tablet Commonly known as:  MOBIC Take 1 Tab by mouth daily (with breakfast). We Performed the Following REFERRAL TO GASTROENTEROLOGY [OHE73 Custom] Comments:  
 Refer to Dr Alfredo Salinas for colon cancer screening Follow-up Instructions Return in about 1 year (around 8/7/2019) for physical.  
  
To-Do List   
 08/07/2018 Lab:  HEPATITIS C AB   
  
 08/07/2018 Lab:  PSA, DIAGNOSTIC (PROSTATE SPECIFIC AG)   
  
 02/04/2019 Lab:  LIPID PANEL   
  
 02/04/2019 Lab:  METABOLIC PANEL, COMPREHENSIVE   
  
 02/05/2019 Lab:  CBC WITH AUTOMATED DIFF Referral Information Referral ID Referred By Referred To  
  
 [id number] KRYS CARDENAS MD   
   [address] [place] [address] Drive [address] [place], [address] Phone: [phone] Fax: [phone] Visits Status Start Date End Date 1 New Request 8/7/18 8/7/19 If your referral has a status of pending review or denied, additional information will be sent to support the outcome of this decision. Patient Instructions High Blood Pressure: Care Instructions Your Care Instructions If your blood pressure is usually above 130/80, you have high blood pressure, or hypertension. That means the top number is 130 or higher or the bottom number is 80 or higher, or both. Despite what a lot of people think, high blood pressure usually doesn't cause headaches or make you feel dizzy or lightheaded. It usually has no symptoms. But it does increase your risk for heart attack, stroke, and kidney or eye damage. The higher your blood pressure, the more your risk increases. Your doctor will give you a goal for your blood pressure. Your goal will be based on your health and your age. Lifestyle changes, such as eating healthy and being active, are always important to help lower blood pressure. You might also take medicine to reach your blood pressure goal. 
Follow-up care is a key part of your treatment and safety. Be sure to make and go to all appointments, and call your doctor if you are having problems. It's also a good idea to know your test results and keep a list of the medicines you take. How can you care for yourself at home? Medical treatment · If you stop taking your medicine, your blood pressure will go back up. You may take one or more types of medicine to lower your blood pressure. Be safe with medicines. Take your medicine exactly as prescribed. Call your doctor if you think you are having a problem with your medicine. · Talk to your doctor before you start taking aspirin every day. Aspirin can help certain people lower their risk of a heart attack or stroke. But taking aspirin isn't right for everyone, because it can cause serious bleeding. · See your doctor regularly. You may need to see the doctor more often at first or until your blood pressure comes down. · If you are taking blood pressure medicine, talk to your doctor before you take decongestants or anti-inflammatory medicine, such as ibuprofen. Some of these medicines can raise blood pressure. · Learn how to check your blood pressure at home. Lifestyle changes · Stay at a healthy weight. This is especially important if you put on weight around the waist. Losing even 10 pounds can help you lower your blood pressure. · If your doctor recommends it, get more exercise. Walking is a good choice. Bit by bit, increase the amount you walk every day. Try for at least 30 minutes on most days of the week. You also may want to swim, bike, or do other activities. · Avoid or limit alcohol. Talk to your doctor about whether you can drink any alcohol.  
· Try to limit how much sodium you eat to less than 2,300 milligrams (mg) a day. Your doctor may ask you to try to eat less than 1,500 mg a day. · Eat plenty of fruits (such as bananas and oranges), vegetables, legumes, whole grains, and low-fat dairy products. · Lower the amount of saturated fat in your diet. Saturated fat is found in animal products such as milk, cheese, and meat. Limiting these foods may help you lose weight and also lower your risk for heart disease. · Do not smoke. Smoking increases your risk for heart attack and stroke. If you need help quitting, talk to your doctor about stop-smoking programs and medicines. These can increase your chances of quitting for good. When should you call for help? Call 911 anytime you think you may need emergency care. This may mean having symptoms that suggest that your blood pressure is causing a serious heart or blood vessel problem. Your blood pressure may be over 180/110. 
 For example, call 911 if: 
  · You have symptoms of a heart attack. These may include: ¨ Chest pain or pressure, or a strange feeling in the chest. 
¨ Sweating. ¨ Shortness of breath. ¨ Nausea or vomiting. ¨ Pain, pressure, or a strange feeling in the back, neck, jaw, or upper belly or in one or both shoulders or arms. ¨ Lightheadedness or sudden weakness. ¨ A fast or irregular heartbeat.  
  · You have symptoms of a stroke. These may include: 
¨ Sudden numbness, tingling, weakness, or loss of movement in your face, arm, or leg, especially on only one side of your body. ¨ Sudden vision changes. ¨ Sudden trouble speaking. ¨ Sudden confusion or trouble understanding simple statements. ¨ Sudden problems with walking or balance. ¨ A sudden, severe headache that is different from past headaches.  
  · You have severe back or belly pain.  
 Do not wait until your blood pressure comes down on its own.  Get help right away. 
 Call your doctor now or seek immediate care if: 
  · Your blood pressure is much higher than normal (such as 180/110 or higher), but you don't have symptoms.  
  · You think high blood pressure is causing symptoms, such as: ¨ Severe headache. ¨ Blurry vision.  
 Watch closely for changes in your health, and be sure to contact your doctor if: 
  · Your blood pressure measures 140/90 or higher at least 2 times. That means the top number is 140 or higher or the bottom number is 90 or higher, or both.  
  · You think you may be having side effects from your blood pressure medicine.  
  · Your blood pressure is usually normal, but it goes above normal at least 2 times. Where can you learn more? Go to http://ema-cristian.info/. Enter J019 in the search box to learn more about \"High Blood Pressure: Care Instructions. \" Current as of: December 6, 2017 Content Version: 11.7 © 9067-8411 BusyEvent. Care instructions adapted under license by [employer] (which disclaims liability or warranty for this information). If you have questions about a medical condition or this instruction, always ask your healthcare professional. [address] [address] any warranty or liability for your use of this information. Introducing [facility]! [facility] introduces [employer] patient portal. Now you can access parts of your medical record, email your doctor's office, and request medication refills online. 1. In your internet browser, go to [email or web address] 2. Click on the First Time User? Click Here link in the Sign In box. You will see the New Member Sign Up page. 3. Enter your [employer] Access Code exactly as it appears below. You will not need to use this code after youve completed the sign-up process. If you do not sign up before the expiration date, you must request a new code. · [employer] Access Code: [email or web address] Expires: 11/5/2018 10:26 AM 
 
4.  Enter the last four digits of your Social Security Number (xxxx) and Date of Birth (mm/dd/yyyy) as indicated and click Submit. You will be taken to the next sign-up page. 5. Create a infoBizz ID. This will be your infoBizz login ID and cannot be changed, so think of one that is secure and easy to remember. 6. Create a infoBizz password. You can change your password at any time. 7. Enter your Password Reset Question and Answer. This can be used at a later time if you forget your password. 8. Enter your e-mail address. You will receive e-mail notification when new information is available in [address] 9. Click Sign Up. You can now view and download portions of your medical record. 10. Click the Download Summary menu link to download a portable copy of your medical information. If you have questions, please visit the Frequently Asked Questions section of the infoBizz website. Remember, infoBizz is NOT to be used for urgent needs. For medical emergencies, dial 911. Now available from your iPhone and Android! Please provide this summary of care documentation to your next provider. Your primary care clinician is listed as KRYS CARDENAS. If you have any questions after today's visit, please call [phone].

## 2018-08-08 LAB
A-G RATIO,AGRAT: 1.4 RATIO (ref 1.1–2.6)
ABSOLUTE LYMPHOCYTE COUNT, 10803: 1.1 K/UL (ref 1–4.8)
ALBUMIN SERPL-MCNC: 4.2 G/DL (ref 3.5–5)
ALP SERPL-CCNC: 128 U/L (ref 40–125)
ALT SERPL-CCNC: 65 U/L (ref 5–40)
ANION GAP SERPL CALC-SCNC: 21 MMOL/L
AST SERPL W P-5'-P-CCNC: 123 U/L (ref 10–37)
BASOPHILS # BLD: 0 K/UL (ref 0–0.2)
BASOPHILS NFR BLD: 1 % (ref 0–2)
BILIRUB SERPL-MCNC: 1.3 MG/DL (ref 0.2–1.2)
BUN SERPL-MCNC: 4 MG/DL (ref 6–22)
CALCIUM SERPL-MCNC: 9.5 MG/DL (ref 8.4–10.4)
CHLORIDE SERPL-SCNC: 87 MMOL/L (ref 98–110)
CHOLEST SERPL-MCNC: 215 MG/DL (ref 110–200)
CO2 SERPL-SCNC: 25 MMOL/L (ref 20–32)
CREAT SERPL-MCNC: 0.4 MG/DL (ref 0.8–1.6)
EOSINOPHIL # BLD: 0.1 K/UL (ref 0–0.5)
EOSINOPHIL NFR BLD: 2 % (ref 0–6)
ERYTHROCYTE [DISTWIDTH] IN BLOOD BY AUTOMATED COUNT: 16.1 % (ref 10–15.5)
GFRAA, 66117: >60
GFRNA, 66118: >60
GLOBULIN,GLOB: 2.9 G/DL (ref 2–4)
GLUCOSE SERPL-MCNC: 100 MG/DL (ref 70–99)
GRANULOCYTES,GRANS: 52 % (ref 40–75)
HCT VFR BLD AUTO: 42.4 % (ref 39.3–51.6)
HCV AB SER IA-ACNC: NORMAL
HDLC SERPL-MCNC: 1.5 MG/DL (ref 0–5)
HDLC SERPL-MCNC: 141 MG/DL (ref 40–59)
HGB BLD-MCNC: 15 G/DL (ref 13.1–17.2)
LDLC SERPL CALC-MCNC: 63 MG/DL (ref 50–99)
LYMPHOCYTES, LYMLT: 24 % (ref 20–45)
MCH RBC QN AUTO: 34 PG (ref 26–34)
MCHC RBC AUTO-ENTMCNC: 35 G/DL (ref 31–36)
MCV RBC AUTO: 97 FL (ref 80–95)
MONOCYTES # BLD: 1 K/UL (ref 0.1–1)
MONOCYTES NFR BLD: 21 % (ref 3–12)
NEUTROPHILS # BLD AUTO: 2.5 K/UL (ref 1.8–7.7)
PLATELET # BLD AUTO: 185 K/UL (ref 140–440)
PMV BLD AUTO: 12.1 FL (ref 9–13)
POTASSIUM SERPL-SCNC: 4.6 MMOL/L (ref 3.5–5.5)
PROT SERPL-MCNC: 7.1 G/DL (ref 6.2–8.1)
PSA SERPL-MCNC: 1.1 NG/ML
RBC # BLD AUTO: 4.39 M/UL (ref 3.8–5.8)
SODIUM SERPL-SCNC: 133 MMOL/L (ref 133–145)
TRIGL SERPL-MCNC: 57 MG/DL (ref 40–149)
VLDLC SERPL CALC-MCNC: 11 MG/DL (ref 8–30)
WBC # BLD AUTO: 4.8 K/UL (ref 4–11)

## 2018-08-16 ENCOUNTER — TELEPHONE (OUTPATIENT)
Dept: FAMILY MEDICINE CLINIC | Age: 63
End: 2018-08-16

## 2018-08-16 NOTE — TELEPHONE ENCOUNTER
Patient is aware liver test are elevated so he should try to cut back on the ETOH. The rest of his blood work is good. Patient verbalizes understanding.

## 2018-08-16 NOTE — TELEPHONE ENCOUNTER
His liver tests are elevated so he should try to cut back on ETOH use since it is affecting his liver.  Rest of his blood work is good

## 2018-09-25 LAB — COLONOSCOPY, EXTERNAL: NORMAL

## 2019-03-13 ENCOUNTER — OFFICE VISIT (OUTPATIENT)
Dept: FAMILY MEDICINE CLINIC | Age: 64
End: 2019-03-13

## 2019-03-13 VITALS
BODY MASS INDEX: 14.9 KG/M2 | DIASTOLIC BLOOD PRESSURE: 66 MMHG | OXYGEN SATURATION: 96 % | HEIGHT: 72 IN | HEART RATE: 153 BPM | TEMPERATURE: 97.4 F | SYSTOLIC BLOOD PRESSURE: 97 MMHG | RESPIRATION RATE: 20 BRPM | WEIGHT: 110 LBS

## 2019-03-13 DIAGNOSIS — A09 DIARRHEA OF INFECTIOUS ORIGIN: ICD-10-CM

## 2019-03-13 DIAGNOSIS — R06.02 SOB (SHORTNESS OF BREATH): ICD-10-CM

## 2019-03-13 DIAGNOSIS — J11.1 FLU: ICD-10-CM

## 2019-03-13 DIAGNOSIS — F10.10 ETOH ABUSE: ICD-10-CM

## 2019-03-13 DIAGNOSIS — R52 GENERALIZED BODY ACHES: ICD-10-CM

## 2019-03-13 DIAGNOSIS — A09 DIARRHEA OF INFECTIOUS ORIGIN: Primary | ICD-10-CM

## 2019-03-13 LAB
QUICKVUE INFLUENZA TEST: POSITIVE
VALID INTERNAL CONTROL?: YES

## 2019-03-13 RX ORDER — OSELTAMIVIR PHOSPHATE 75 MG/1
75 CAPSULE ORAL 2 TIMES DAILY
Qty: 10 CAP | Refills: 0 | Status: SHIPPED | OUTPATIENT
Start: 2019-03-13 | End: 2019-03-18

## 2019-03-13 NOTE — PATIENT INSTRUCTIONS
Frequent Abdominal Pain: Care Instructions  Your Care Instructions    Frequent abdominal pain means you have belly pain that occurs at least 3 times over 3 months. Sometimes the pain is linked to eating certain foods or having a bowel movement. But most of the time the pain cannot be explained. Your doctor may use the words \"functional abdominal pain\" or \"recurrent abdominal pain\" to describe the problem. It can be hard to deal with pain when your doctor cannot find a cause, even after tests are done. When the pain is very bad, it can keep you from doing your normal activities. Sometimes stress can make your pain worse. Even if you cannot make the pain go away, there are things you can do to make it a little easier to manage. Follow-up care is a key part of your treatment and safety. Be sure to make and go to all appointments, and call your doctor if you are having problems. It's also a good idea to know your test results and keep a list of the medicines you take. How can you care for yourself at home? · Keep a symptom diary. This can help you see if there are events or emotions that make your pain worse. Think about what you ate, drank, or felt before the pain began. Maybe pain comes after a stressful meeting or when you have spicy foods, dairy products, or alcohol. · Reduce stress. Breathing exercises and relaxation techniques can help. Try taking a walk or doing other exercise when you feel stressed. · Try cognitive-behavioral therapy. You can work with a counselor to learn how to do this therapy. It can help you cope with pain by changing how you think. It can help you notice discouraging thoughts that make you feel bad. When should you call for help?   Call your doctor now or seek immediate medical care if:    · You have a fever and belly pain.     · You have severe pain that is different from your usual belly pain.    Watch closely for changes in your health, and be sure to contact your doctor if:    · Your pattern of pain changes.     · You have questions or concerns about your belly pain. Where can you learn more? Go to http://ema-cristian.info/. Enter D733 in the search box to learn more about \"Frequent Abdominal Pain: Care Instructions. \"  Current as of: September 23, 2018  Content Version: 11.9  © 7130-5368 BrainRush. Care instructions adapted under license by [employer]ed[employer] (which disclaims liability or warranty for this information). If you have questions about a medical condition or this instruction, always ask your healthcare professional. [address] [address] any warranty or liability for your use of this information.

## 2019-03-13 NOTE — PROGRESS NOTES
Patient is in the office today for abdominal pain and worms in stool. Patient is a heavy drinker, and is currently intoxicated. 1. Have you been to the ER, urgent care clinic since your last visit? Hospitalized since your last visit? No    2. Have you seen or consulted any other health care providers outside of the [address] [place] [address] since your last visit? Include any pap smears or colon screening.  No

## 2019-03-14 LAB
A-G RATIO,AGRAT: 1.3 RATIO (ref 1.1–2.6)
ABSOLUTE LYMPHOCYTE COUNT, 10803: 1.7 K/UL (ref 1–4.8)
ALBUMIN SERPL-MCNC: 3.5 G/DL (ref 3.5–5)
ALP SERPL-CCNC: 142 U/L (ref 40–125)
ALT SERPL-CCNC: 44 U/L (ref 5–40)
ANION GAP SERPL CALC-SCNC: 15 MMOL/L
AST SERPL W P-5'-P-CCNC: 76 U/L (ref 10–37)
BASOPHILS # BLD: 0.1 K/UL (ref 0–0.2)
BASOPHILS NFR BLD: 1 % (ref 0–2)
BILIRUB SERPL-MCNC: 0.3 MG/DL (ref 0.2–1.2)
BUN SERPL-MCNC: 4 MG/DL (ref 6–22)
CALCIUM SERPL-MCNC: 8.7 MG/DL (ref 8.4–10.4)
CHLORIDE SERPL-SCNC: 90 MMOL/L (ref 98–110)
CO2 SERPL-SCNC: 20 MMOL/L (ref 20–32)
CREAT SERPL-MCNC: 0.4 MG/DL (ref 0.8–1.6)
EOSINOPHIL # BLD: 0.1 K/UL (ref 0–0.5)
EOSINOPHIL NFR BLD: 3 % (ref 0–6)
ERYTHROCYTE [DISTWIDTH] IN BLOOD BY AUTOMATED COUNT: 13.8 % (ref 10–15.5)
GFRAA, 66117: >60
GFRNA, 66118: >60
GLOBULIN,GLOB: 2.8 G/DL (ref 2–4)
GLUCOSE SERPL-MCNC: 68 MG/DL (ref 70–99)
GRANULOCYTES,GRANS: 36 % (ref 40–75)
HCT VFR BLD AUTO: 38.2 % (ref 39.3–51.6)
HGB BLD-MCNC: 12.5 G/DL (ref 13.1–17.2)
LYMPHOCYTES, LYMLT: 40 % (ref 20–45)
MCH RBC QN AUTO: 34 PG (ref 26–34)
MCHC RBC AUTO-ENTMCNC: 33 G/DL (ref 31–36)
MCV RBC AUTO: 103 FL (ref 80–95)
MONOCYTES # BLD: 0.8 K/UL (ref 0.1–1)
MONOCYTES NFR BLD: 20 % (ref 3–12)
NEUTROPHILS # BLD AUTO: 1.5 K/UL (ref 1.8–7.7)
PLATELET # BLD AUTO: 225 K/UL (ref 140–440)
PMV BLD AUTO: 11.1 FL (ref 9–13)
POTASSIUM SERPL-SCNC: 4.1 MMOL/L (ref 3.5–5.5)
PROT SERPL-MCNC: 6.3 G/DL (ref 6.2–8.1)
RBC # BLD AUTO: 3.7 M/UL (ref 3.8–5.8)
SODIUM SERPL-SCNC: 125 MMOL/L (ref 133–145)
TSH SERPL DL<=0.005 MIU/L-ACNC: 4.08 MCU/ML (ref 0.27–4.2)
WBC # BLD AUTO: 4.2 K/UL (ref 4–11)

## 2019-03-14 NOTE — PROGRESS NOTES
Ovidio Hughes is a 66 y.o.  male and presents with Abdominal Pain (diarrhea/worms); Alcohol intoxication; Depression (2 yrs ); and Shortness of Breath      SUBJECTIVE:  Patient over the last couple of years since he has retired per his wife has been drinking beer if and when he wakes up in the morning until he goes to bed at night. Patient has lost about 30 pounds over the last couple of years due to not eating healthy and constantly drinking. Patient also continues to smoke and is short of breath at times with any kind of significant activity. Patient over the last week noticed increased fatigue associated with diarrhea and some nausea and one episode of vomiting. With his bowel movements patient and his wife thought that they saw worms in his stool. Patient has had some abdominal pain with the diarrhea but has not noticed any blood in his stool. Patient just had a colonoscopy in August 2018 showed 1 polyp. Patient does not admit that he has a problem with alcohol and says that he can stop it on his own. He does not want to go to Alcoholics Anonymous or rehab. His wife seems very frustrated. Respiratory ROS: negative for - shortness of breath  Cardiovascular ROS: negative for - chest pain    Current Outpatient Medications   Medication Sig    umeclidinium-vilanterol (ANORO ELLIPTA) 62.5-25 mcg/actuation inhaler Take 1 Puff by inhalation daily.  oseltamivir (TAMIFLU) 75 mg capsule Take 1 Cap by mouth two (2) times a day for 5 days.  ibuprofen (MOTRIN) 200 mg tablet Take  by mouth.  meloxicam (MOBIC) 15 mg tablet Take 1 Tab by mouth daily (with breakfast).  cyclobenzaprine (FLEXERIL) 10 mg tablet Take 1 Tab by mouth three (3) times daily as needed for Muscle Spasm(s). No current facility-administered medications for this visit.           OBJECTIVE:  ill-appearing and mild ETOH intoxication  Visit Vitals  BP 97/66 (BP 1 Location: Left arm, BP Patient Position: Sitting) Pulse (!) 153   Temp 97.4 °F (36.3 °C) (Oral)   Resp 20   Ht 6' (1.829 m)   Wt 110 lb (49.9 kg)   SpO2 96%   BMI 14.92 kg/m²      cachetic  Chest - wheezing noted bilateraly  Heart - normal rate, regular rhythm, normal S1, S2, no murmurs, rubs, clicks or gallops  Abdomen - soft, nontender, nondistended, no masses or organomegaly  Extremities - peripheral pulses normal, no pedal edema, no clubbing or cyanosis    Labs:   Lab Results   Component Value Date/Time    WBC 4.2 03/13/2019 09:34 AM    HGB 12.5 (L) 03/13/2019 09:34 AM    HCT 38.2 (L) 03/13/2019 09:34 AM    PLATELET 322 30/57/4185 09:34 AM     (H) 03/13/2019 09:34 AM     Lab Results   Component Value Date/Time    Prostate Specific Ag 1.100 08/07/2018 10:36 AM    Prostate Specific Ag 2.0 11/10/2010 03:04 PM     Lab Results   Component Value Date/Time    Sodium 125 (L) 03/13/2019 09:34 AM    Potassium 4.1 03/13/2019 09:34 AM    Chloride 90 (L) 03/13/2019 09:34 AM    CO2 20 03/13/2019 09:34 AM    Anion gap 15.0 03/13/2019 09:34 AM    Glucose 68 (L) 03/13/2019 09:34 AM    BUN 4 (L) 03/13/2019 09:34 AM    Creatinine 0.4 (L) 03/13/2019 09:34 AM    BUN/Creatinine ratio 6 (L) 11/26/2017 10:45 AM    GFR est AA >60 11/26/2017 10:45 AM    GFR est non-AA >60 11/26/2017 10:45 AM    Calcium 8.7 03/13/2019 09:34 AM    Bilirubin, total 0.3 03/13/2019 09:34 AM    ALT (SGPT) 44 (H) 03/13/2019 09:34 AM    AST (SGOT) 76 (H) 03/13/2019 09:34 AM    Alk. phosphatase 142 (H) 03/13/2019 09:34 AM    Protein, total 6.3 03/13/2019 09:34 AM    Albumin 3.5 03/13/2019 09:34 AM    Globulin 2.8 03/13/2019 09:34 AM    A-G Ratio 1.3 03/13/2019 09:34 AM        Assessment/Plan      ICD-10-CM ICD-9-CM    1.  Diarrhea of infectious origin A09 009.3 zack check WBC, STOOL      CULTURE, STOOL      OVA+PARASITE + GIARDIA      METABOLIC PANEL, COMPREHENSIVE      TSH 3RD GENERATION      CBC WITH AUTOMATED DIFF      CBC WITH AUTOMATED DIFF      TSH 3RD GENERATION      METABOLIC PANEL, COMPREHENSIVE 2. SOB (shortness of breath) R06.02 786.05 Will check XR CHEST PA LAT      And start on umeclidinium-vilanterol (ANORO ELLIPTA) 62.5-25 mcg/actuation inhaler for presumed COPD     3. Generalized body aches R52 780.96 AMB POC RAPID INFLUENZA TEST positive    4. Flu J11.1 487. 1 Will treat with oseltamivir (TAMIFLU) 75 mg capsule   5. ETOH abuse F10.10 305.00 Pt not willing to admit he has a problem. Also does not want to get help for depression at this time. Follow-up Disposition:  Return if symptoms worsen or fail to improve. Reviewed plan of care. Patient has provided input and agrees with goals.

## 2019-03-19 LAB — RESULT: NORMAL

## 2019-03-20 LAB — SHIGA TOXIN EIA, 16335: NEGATIVE

## 2019-03-21 LAB
RESULT: NORMAL
RESULT: NORMAL

## 2019-03-25 ENCOUNTER — HOSPITAL ENCOUNTER (OUTPATIENT)
Dept: GENERAL RADIOLOGY | Age: 64
Discharge: HOME | End: 2019-03-25
Payer: COMMERCIAL

## 2019-03-25 DIAGNOSIS — R06.02 SOB (SHORTNESS OF BREATH): ICD-10-CM

## 2019-03-25 LAB — RESULT: NORMAL

## 2019-03-25 PROCEDURE — 71046 X-RAY EXAM CHEST 2 VIEWS: CPT

## 2019-04-17 ENCOUNTER — TELEPHONE (OUTPATIENT)
Dept: FAMILY MEDICINE CLINIC | Age: 64
End: 2019-04-17

## 2019-04-22 NOTE — TELEPHONE ENCOUNTER
Tell pt all his stool tests were normal. 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 If he feels short of breath or having any other concerns he can f/u with me.   He should try to stop smoking and drinking Alcohol

## 2019-04-22 NOTE — TELEPHONE ENCOUNTER
Patient is aware stool test is normal. CXR shows he has emphysema from and his Alcohol is use is affecting his liver. If he feels short of breath or having any other concerns he can f/u with Dr. Zo Lozada. Patient was advised to stop drinking alcohol and stop smoking. Patient verbalizes understanding.

## 2019-07-16 ENCOUNTER — TELEPHONE (OUTPATIENT)
Dept: ORTHOPEDIC SURGERY | Age: 64
End: 2019-07-16

## 2019-07-16 NOTE — TELEPHONE ENCOUNTER
Patients request for a temporary DMV handicap placard application approved. Application can be picked up at the [facility] office today. We will not fax any documents.      Teena Cavanaugh PA-C  7/16/2019  2:04 PM

## 2019-07-16 NOTE — TELEPHONE ENCOUNTER
Spoke with patient and made him aware that its ready at [facility] for .

## 2019-07-16 NOTE — TELEPHONE ENCOUNTER
Patient called in states that he just noticed that his handicap placard is  and Dr. Megan Clayton (per pt) states that he is permanently handicap. Patient states that when he was originally in the hospital, Dr. Megan Clayton or Kirti Talavera filled out the paper work and faxed it to [facility] for him. He is requesting to have that done again as he can not use his current placard with it .  Patient can be reached at [phone]

## 2019-07-18 NOTE — TELEPHONE ENCOUNTER
Patient called stating that he picked up the form and went to [facility] to notice the form said temporary. He said he needs permanent tags because he is fully disabled and it is difficult for him to even come out to  the form. He said that it has been faxed in the past to [facility] and it wanting to have it faxed again. Please advise patient at [phone] when completed.

## 2019-07-19 NOTE — TELEPHONE ENCOUNTER
Spoke with patient and explained Per meri Barnes, we are unable to give a permanent DMV form at this time. We have not seen him for about 5 years and he would need to make a follow up appointment if he wants the permanent DMV placard. He understand and he didn't want to make an appointment at this time.

## 2019-08-01 DIAGNOSIS — R06.02 SOB (SHORTNESS OF BREATH): ICD-10-CM

## 2019-08-01 RX ORDER — UMECLIDINIUM BROMIDE AND VILANTEROL TRIFENATATE 62.5; 25 UG/1; UG/1
POWDER RESPIRATORY (INHALATION)
Qty: 1 INHALER | Refills: 3 | Status: SHIPPED | OUTPATIENT
Start: 2019-08-01 | End: 2020-03-16

## 2019-09-10 ENCOUNTER — OFFICE VISIT (OUTPATIENT)
Dept: ORTHOPEDIC SURGERY | Age: 64
End: 2019-09-10

## 2019-09-10 VITALS
TEMPERATURE: 96.4 F | DIASTOLIC BLOOD PRESSURE: 90 MMHG | OXYGEN SATURATION: 94 % | HEART RATE: 112 BPM | BODY MASS INDEX: 14.9 KG/M2 | HEIGHT: 72 IN | SYSTOLIC BLOOD PRESSURE: 132 MMHG | WEIGHT: 110 LBS

## 2019-09-10 DIAGNOSIS — M19.172 POST-TRAUMATIC OSTEOARTHRITIS OF LEFT ANKLE: Primary | ICD-10-CM

## 2019-09-10 NOTE — PROGRESS NOTES
AMBULATORY PROGRESS NOTE      Patient: Jose Bingham             MRN: [id number]     SSN: [id number] Body mass index is 14.92 kg/m². YOB: 1955     AGE: 60 y.o. SEX: male    PCP: Shanna Ding MD     IMPRESSION/DIAGNOSIS AND TREATMENT PLAN     DIAGNOSES  1. Post-traumatic osteoarthritis of left ankle        No orders of the defined types were placed in this encounter. Jose Bingham understands his diagnoses and the proposed plan. Plan:    1) Paperwork provided for a permanent DMV handicap decal.   2) Continue activity modification as directed. RTO - PRN     HPI AND EXAMINATION     Jose Bingham IS A 60 y.o. male who presents to my outpatient office complaining of foot pain. Mr. Laurie Chase has been seen by me in the past after sustaining a severe crush injury to his left foot. He states that he still experiences pain in his left ankle and foot. He inquires about whether he can receive a permanent DMV handicap decal, as he experiences pain while walking. He presents to the office today with a cane, which he uses regularly. He is accompanied to the office today by his wife. Of note, his wife mentions that he frequently feels cold and is bothered by air conditioning. Visit Vitals  /90   Pulse (!) 112   Temp 96.4 °F (35.8 °C) (Oral)   Ht 6' (1.829 m)   Wt 110 lb (49.9 kg)   SpO2 94%   BMI 14.92 kg/m²     Appearance: Alert, well appearing and pleasant patient who is in no distress, oriented to person, place/time, and who follows commands. This patient is accompanied in the examination room by his wife. Dementia: no dementia  Psychiatric: Affect and mood are appropriate. Patient arrives to office via: with assistive device: cane  HEENT: Head normocephalic & atraumatic.  Both pupils are round, non icteric sclera   Eye: EOM are intact and sclera are clear    Neck: ROM WNL and JVD neck is not present     Hearings Intact, does not require hearing aid device  Respiratory: Breathing is unlabored without accessory chest muscle use  Cardiovascular/Peripheral Vascular: Normal Pulses to each foot    ANKLE/FOOT left    Gait: slow and antlagic, uses cane  Tenderness: Not tested at this time  Cutaneous: Well healed remote surgical scars to the ankle. Joint Motion: Restricted ROM. Joint / Tendon Stability: No Ankle or Subtalar instability or joint laxity. No peroneal sublux ability or dislocation  Alignment: Forefoot, Midfoot, Hindfoot WNL. Neuro Motor/Sensory: NL/NL. Vascular: NL foot/ankle pulses. Lymphatics: No extremity lymphedema, No calf swelling, no tenderness to calf muscles. CHART REVIEW     Past Medical History:   Diagnosis Date    Ankle fracture, left 10/19/2008    Cough     DJD (degenerative joint disease) of knee 11/10/2010    ED (erectile dysfunction) 11/10/2010    Fracture of left tibia 10/19/2008    Severe Grade IIIB    GERD (gastroesophageal reflux disease) 11/10/2010    denies    Hearing loss     Left ankle pain     Left ankle sprain     Left ankle strain     Pain with urination     Sinusitis 11/10/2010    Wears glasses      Current Outpatient Medications   Medication Sig    ANORO ELLIPTA 62.5-25 mcg/actuation inhaler inhale 1 puff by mouth once daily    ibuprofen (MOTRIN) 200 mg tablet Take  by mouth.  meloxicam (MOBIC) 15 mg tablet Take 1 Tab by mouth daily (with breakfast).  cyclobenzaprine (FLEXERIL) 10 mg tablet Take 1 Tab by mouth three (3) times daily as needed for Muscle Spasm(s). No current facility-administered medications for this visit.       Allergies   Allergen Reactions    Niacin Rash     Past Surgical History:   Procedure Laterality Date    HX ANKLE FRACTURE TX  10/20/2008    HX ORTHOPAEDIC      Several surgeries in the past which required ORIF of the distal tibia posterior approach and bone grafting x2 separate surgeries    [place] ANESTH,SURGERY OF SHOULDER      11/27/17     Social History     Occupational History    Not on file   Tobacco Use    Smoking status: Current Every Day Smoker     Packs/day: 1.50     Types: Cigarettes    Smokeless tobacco: Never Used   Substance and Sexual Activity    Alcohol use: Yes     Alcohol/week: 0.8 - 5.0 standard drinks     Types: 1 - 6 Cans of beer per week    Drug use: No    Sexual activity: Not Currently     Family History   Problem Relation Age of Onset   Ritu Flowers Migraines Mother     Hypertension Other     Arthritis-osteo Other         REVIEW OF SYSTEMS : 9/10/2019  ALL BELOW ARE Negative except : SEE HPI     Constitutional: Negative for fever, chills and weight loss. Neg Weight Loss  Cardiovascular: Negative for chest pain, claudication and leg swelling. SOB, LEES   Gastrointestinal/Urological: Negative for  pain, N/V/D/C, Blood in stool or urine,dysuria                         Hematuria, Incontinence, pelvic pain  Musculoskeletal: see HPI. Neurological: Negative for dizziness and weakness, headaches,Visual Changes             Confusion,  Or Seizures,   Psychiatric/Behavioral: Negative for depression, memory loss and substance abuse. Extremities:  Negative for hair changes, rash or skin lesion changes. Hematologic: Negative for Bleeding problems, bruising, pallor or swollen lymph nodes. Peripheral Vascular: No calf pain, vascular vein tenderness to calf pain              No calf throbbing, posterior knee throbbing pain     DIAGNOSTIC IMAGING     No notes on file    Please see above section of this report. I have personally reviewed the results of the above study. The interpretation of this study is my professional opinion. Written by Reji Fung, as dictated by Dr. Josy Amaya. I, Dr. Josy Amaya, confirm that all documentation is accurate.

## 2019-12-04 ENCOUNTER — OFFICE VISIT (OUTPATIENT)
Dept: ORTHOPEDIC SURGERY | Age: 64
End: 2019-12-04

## 2019-12-04 ENCOUNTER — HOSPITAL ENCOUNTER (EMERGENCY)
Age: 64
Discharge: HOME | End: 2019-12-04
Attending: EMERGENCY MEDICINE
Payer: COMMERCIAL

## 2019-12-04 ENCOUNTER — APPOINTMENT (OUTPATIENT)
Dept: VASCULAR SURGERY | Age: 64
End: 2019-12-04
Attending: PHYSICIAN ASSISTANT
Payer: COMMERCIAL

## 2019-12-04 VITALS
BODY MASS INDEX: 15.44 KG/M2 | SYSTOLIC BLOOD PRESSURE: 123 MMHG | OXYGEN SATURATION: 95 % | HEART RATE: 98 BPM | TEMPERATURE: 98 F | WEIGHT: 114 LBS | HEIGHT: 72 IN | DIASTOLIC BLOOD PRESSURE: 81 MMHG | RESPIRATION RATE: 18 BRPM

## 2019-12-04 VITALS
TEMPERATURE: 97 F | WEIGHT: 114.8 LBS | OXYGEN SATURATION: 97 % | HEIGHT: 72 IN | SYSTOLIC BLOOD PRESSURE: 126 MMHG | HEART RATE: 101 BPM | BODY MASS INDEX: 15.55 KG/M2 | RESPIRATION RATE: 15 BRPM | DIASTOLIC BLOOD PRESSURE: 83 MMHG

## 2019-12-04 DIAGNOSIS — M19.172 POST-TRAUMATIC OSTEOARTHRITIS OF LEFT ANKLE: ICD-10-CM

## 2019-12-04 DIAGNOSIS — M79.672 LEFT FOOT PAIN: ICD-10-CM

## 2019-12-04 DIAGNOSIS — M79.89 PAIN AND SWELLING OF LEFT LOWER LEG: Primary | ICD-10-CM

## 2019-12-04 DIAGNOSIS — M79.89 LEG SWELLING: Primary | ICD-10-CM

## 2019-12-04 DIAGNOSIS — E87.1 HYPONATREMIA: ICD-10-CM

## 2019-12-04 DIAGNOSIS — M79.662 PAIN AND SWELLING OF LEFT LOWER LEG: Primary | ICD-10-CM

## 2019-12-04 LAB
ALBUMIN SERPL-MCNC: 2.9 G/DL (ref 3.4–5)
ALBUMIN/GLOB SERPL: 0.6 {RATIO} (ref 0.8–1.7)
ALP SERPL-CCNC: 227 U/L (ref 45–117)
ALT SERPL-CCNC: 56 U/L (ref 16–61)
ANION GAP SERPL CALC-SCNC: 10 MMOL/L (ref 3–18)
AST SERPL-CCNC: 127 U/L (ref 10–38)
BASOPHILS # BLD: 0.1 K/UL (ref 0–0.1)
BASOPHILS NFR BLD: 1 % (ref 0–2)
BILIRUB SERPL-MCNC: 0.7 MG/DL (ref 0.2–1)
BUN SERPL-MCNC: 2 MG/DL (ref 7–18)
BUN/CREAT SERPL: 5 (ref 12–20)
CALCIUM SERPL-MCNC: 8.2 MG/DL (ref 8.5–10.1)
CHLORIDE SERPL-SCNC: 93 MMOL/L (ref 100–111)
CO2 SERPL-SCNC: 25 MMOL/L (ref 21–32)
CREAT SERPL-MCNC: 0.41 MG/DL (ref 0.6–1.3)
CRP SERPL-MCNC: <0.3 MG/DL (ref 0–0.3)
DIFFERENTIAL METHOD BLD: ABNORMAL
EOSINOPHIL # BLD: 0.1 K/UL (ref 0–0.4)
EOSINOPHIL NFR BLD: 2 % (ref 0–5)
ERYTHROCYTE [DISTWIDTH] IN BLOOD BY AUTOMATED COUNT: 15.7 % (ref 11.6–14.5)
ERYTHROCYTE [SEDIMENTATION RATE] IN BLOOD: 9 MM/HR (ref 0–20)
GLOBULIN SER CALC-MCNC: 4.7 G/DL (ref 2–4)
GLUCOSE SERPL-MCNC: 89 MG/DL (ref 74–99)
HCT VFR BLD AUTO: 37 % (ref 36–48)
HGB BLD-MCNC: 12.6 G/DL (ref 13–16)
LYMPHOCYTES # BLD: 1.5 K/UL (ref 0.9–3.6)
LYMPHOCYTES NFR BLD: 28 % (ref 21–52)
MCH RBC QN AUTO: 31.7 PG (ref 24–34)
MCHC RBC AUTO-ENTMCNC: 34.1 G/DL (ref 31–37)
MCV RBC AUTO: 93.2 FL (ref 74–97)
MONOCYTES # BLD: 0.7 K/UL (ref 0.05–1.2)
MONOCYTES NFR BLD: 14 % (ref 3–10)
NEUTS SEG # BLD: 3 K/UL (ref 1.8–8)
NEUTS SEG NFR BLD: 55 % (ref 40–73)
PLATELET # BLD AUTO: 193 K/UL (ref 135–420)
PMV BLD AUTO: 9.9 FL (ref 9.2–11.8)
POTASSIUM SERPL-SCNC: 3.4 MMOL/L (ref 3.5–5.5)
PROT SERPL-MCNC: 7.6 G/DL (ref 6.4–8.2)
RBC # BLD AUTO: 3.97 M/UL (ref 4.7–5.5)
SODIUM SERPL-SCNC: 128 MMOL/L (ref 136–145)
WBC # BLD AUTO: 5.4 K/UL (ref 4.6–13.2)

## 2019-12-04 PROCEDURE — 93922 UPR/L XTREMITY ART 2 LEVELS: CPT

## 2019-12-04 PROCEDURE — 85652 RBC SED RATE AUTOMATED: CPT

## 2019-12-04 PROCEDURE — 99285 EMERGENCY DEPT VISIT HI MDM: CPT

## 2019-12-04 PROCEDURE — 93971 EXTREMITY STUDY: CPT

## 2019-12-04 PROCEDURE — 86140 C-REACTIVE PROTEIN: CPT

## 2019-12-04 PROCEDURE — 80053 COMPREHEN METABOLIC PANEL: CPT

## 2019-12-04 PROCEDURE — 85025 COMPLETE CBC W/AUTO DIFF WBC: CPT

## 2019-12-04 NOTE — PROGRESS NOTES
AMBULATORY PROGRESS NOTE      Patient: Enoch Rivera             MRN: [id number]     SSN: [id number] Body mass index is 15.57 kg/m². YOB: 1955     AGE: 60 y.o. SEX: male    PCP: Jered Garner MD     IMPRESSION/DIAGNOSIS AND TREATMENT PLAN     DIAGNOSES  1. Leg swelling    2. Post-traumatic osteoarthritis of left ankle    3. Left foot pain        Orders Placed This Encounter    [10267] Ankle 2V    [14639] Foot Min 3V      Enoch Rivera understands his diagnoses and the proposed plan. The patient has bilateral lower extremity swelling left worse than right. He is a minimal ambulator. It is hard for me to appreciate his pulses. My recommendation is to get him to the ER for the following data points below. He did have x-rays of his left foot, AP, lateral, and oblique, to include the left ankle, which shows general osteopenia, metatarsus adductus, midfoot OA changes to the number two and three TMT regions, healed left distal tibia fracture with a posterior plate and an indwelling internal bone stimulator. Otherwise, I see no signs for osteomyelitis on these x-rays. He has lost quite a bit of weight. His wife could not tell me how much he has lost, but in seeing him today, he does appear to be malnourished or undernourished. I think he definitely needs a workup. He does smoke quite a bit, at least two packs of cigarettes a day. So, he needs to be worked up for any type of an occult cancer, in my opinion. I spoke with the ER physician on call today at [facility]. She told me she would take a look at him and start a workup on him and coordinate his care, but more urgently would be for the PVL arterial and venous studies, particularly assessing the left and right lower extremities. He has some reddened discoloration to his left lower leg. It does improve and almost disappear when I elevate him. His skin is not hot or erythematous.   I do not suspect a cellulitis. He is not behaving as if he has a cellulitis, and there has been no history of drainage or wound to his left lower leg. Plan:    1) Admittance to ED for. .. 2) PVL venous and arterial studies of the BLE. 3) Lab Work: CBC w/ diff, CMP, ESR, CRP, Lactate. RTO - 1 week     HPI AND EXAMINATION     Neville King IS A 60 y.o. male who presents to my outpatient office for follow up of post-traumatic osteoarthritis of the left ankle. At the last visit, I provided paperwork for a permanent DMV handicap decal, instructed the patient to continue activity modification as directed, and to follow up as needed. Since we saw him last, Mr. Mi Ley states that one morning one week ago, he woke up and noticed that his left leg was swollen and red. He recalls one week prior to this, he was experiencing pain in his left ankle. He denies any pain today, other than his normal pains. The patient denies any fever, shakes, or chills. His wife, who accompanies him to the office, reports that when she first noticed the swelling, she noticed that everything underneath his sock was normal, and that there was no swelling or redness. She states that now that he is not wearing a sock, he has redness and swelling in his foot, as well. He denies h/o prolonged travel. However, he reports that he does not move around a lot, and that he mostly moves from the bed to his chair. He denies any drainage from his leg. His wife states that she has also noticed swelling beginning in his right leg, as well. He denies h/o cardiac issues. He denies cramping in his legs when he walks. He denies h/o vascular issues. The patient smokes. He denies taking blood thinners. His wife states that he has been losing weight and has not been eating as much.      Visit Vitals  /83 (BP 1 Location: Left arm, BP Patient Position: Sitting)   Pulse (!) 101   Temp 97 °F (36.1 °C) (Oral)   Resp 15   Ht 6' (1.829 m)   Wt 114 lb 12.8 oz (52.1 kg)   SpO2 97%   BMI 15.57 kg/m²     Appearance: Alert, well appearing and pleasant patient who is in no distress, oriented to person, place/time, and who follows commands. This patient is accompanied in the examination room by his wife. Dementia: no dementia  Psychiatric: Affect and mood are appropriate. Patient arrives to office via: with assistive device: cane  HEENT: Head normocephalic & atraumatic. Both pupils are round, non icteric sclera   Eye: EOM are intact and sclera are clear    Neck: ROM WNL and JVD neck is not present     Hearings Intact, does not require hearing aid device  Respiratory: Breathing is unlabored without accessory chest muscle use  Cardiovascular/Peripheral Vascular: Difficult to palpate. ANKLE/FOOT left    Gait: uses cane  Tenderness: no tenderness to palpation at this time. Cutaneous: Moderate swelling from the calf down, congested, red in color, not hot    Redness abates with elevation  Joint Motion: not tested at this time. Joint / Tendon Stability: No Ankle or Subtalar instability or joint laxity. No peroneal sublux ability or dislocation  Alignment: Forefoot, Midfoot, Hindfoot WNL. Neuro Motor/Sensory: NL/NL. Vascular: Difficult to palpate. Lymphatics: No extremity lymphedema, No calf swelling, no tenderness to calf muscles. ANKLE/FOOT right    Tenderness: No tenderness to palpation at this time  Cutaneous: Bruising to the #2,3,4 toes. Mild swelling to the right leg, less than left, not as red in color  Joint Motion: WNL. Joint / Tendon Stability: No Ankle or Subtalar instability or joint laxity. No peroneal sublux ability or dislocation  Alignment: Forefoot, Midfoot, Hindfoot WNL. Neuro Motor/Sensory: NL/NL. Vascular: NL foot/ankle pulses. Lymphatics: No extremity lymphedema, No calf swelling, no tenderness to calf muscles.      CHART REVIEW     Past Medical History:   Diagnosis Date    Ankle fracture, left 10/19/2008    Cough     DJD (degenerative joint disease) of knee 11/10/2010    ED (erectile dysfunction) 11/10/2010    Fracture of left tibia 10/19/2008    Severe Grade IIIB    GERD (gastroesophageal reflux disease) 11/10/2010    denies    Hearing loss     Left ankle pain     Left ankle sprain     Left ankle strain     Pain with urination     Sinusitis 11/10/2010    Wears glasses      Current Outpatient Medications   Medication Sig    ANORO ELLIPTA 62.5-25 mcg/actuation inhaler inhale 1 puff by mouth once daily     No current facility-administered medications for this visit. Allergies   Allergen Reactions    Niacin Rash     Past Surgical History:   Procedure Laterality Date    HX ANKLE FRACTURE TX  10/20/2008    HX ORTHOPAEDIC      Several surgeries in the past which required ORIF of the distal tibia posterior approach and bone grafting x2 separate surgeries    [place] ANESTH,SURGERY OF SHOULDER      11/27/17     Social History     Occupational History    Not on file   Tobacco Use    Smoking status: Current Every Day Smoker     Packs/day: 1.50     Types: Cigarettes    Smokeless tobacco: Never Used   Substance and Sexual Activity    Alcohol use: Yes     Alcohol/week: 0.8 - 5.0 standard drinks     Types: 1 - 6 Cans of beer per week    Drug use: No    Sexual activity: Not Currently     Family History   Problem Relation Age of Onset   [de-identified] Migraines Mother     Hypertension Other     Arthritis-osteo Other         REVIEW OF SYSTEMS : 12/4/2019  ALL BELOW ARE Negative except : SEE HPI      REVIEW OF SYSTEMS : Total of 12 systems reviewed as follows:            CONSTITUTIONAL:   Yes weight loss. PSYCHOLOGICAL : No Feelings of anxiety, depression, agitation  EYES: No blurred vision and no eye discharge. NO eye pain, double vision  ENT: No nasal discharge. No ear pain. CARDIOVASCULAR: No chest pain and no diaphoresis. RESPIRATORY: No cough, no hemoptysis.    GI: No vomiting, no diarrhea   : No urinary frequency and no dysuria. MUSCULOSKELETAL: see HPI  SKIN: No rashes. NEURO: Negative for : dizziness,weakness, headaches     Negative for : visual changes or confusion, or seizures,   ENDOCRINE: No polyphagia and no polydipsia. HEMATOLOGY: No bleeding tendencies. DIAGNOSTIC IMAGING     No notes on file    Please see above section of this report. I have personally reviewed the results of the above study. The interpretation of this study is my professional opinion. Written by Estelle Goldstein, as dictated by Dr. Jarad Wheeler. I, Dr. Jarad Wheeler, confirm that all documentation is accurate.

## 2019-12-04 NOTE — ED TRIAGE NOTES
Patient presents with c/o swelling to bilateral lower legs - left greater than right. States swelling x 1.5 weeks. Patient noted to have coolness in left foot and pitting edema. Bruises noted across toes of right foot.  Family member states patient had a fall causing toe injury

## 2019-12-04 NOTE — ED PROVIDER NOTES
EMERGENCY DEPARTMENT HISTORY AND PHYSICAL EXAM    Date: 12/4/2019  Patient Name: Fani Shaffer    History of Presenting Illness     Chief Complaint   Patient presents with    Leg Swelling       HPI: Fani Shaffer, 60 y.o. male presents to the ED after being sent by Dr. Luc Arana' office due to left lower extremity swelling, redness, and pain. Patient has a history of a left ankle injury and repair by Dr. Luc Arana in 2011. Patient reports his wife noticed the discoloration and swelling of his leg approximately 10 days ago. He states the swelling and redness has been unchanged for the past 10 days. No worsening pain to LLE, no coldness to LLE per pt. He denies chest pain, palpitations, shortness of breath. No new fall or injury, no open wounds    There are no other complaints, changes, or physical findings at this time.       Past History     Past Medical History:  Past Medical History:   Diagnosis Date    Ankle fracture, left 10/19/2008    Cough     DJD (degenerative joint disease) of knee 11/10/2010    ED (erectile dysfunction) 11/10/2010    Fracture of left tibia 10/19/2008    Severe Grade IIIB    GERD (gastroesophageal reflux disease) 11/10/2010    denies    Hearing loss     Left ankle pain     Left ankle sprain     Left ankle strain     Pain with urination     Sinusitis 11/10/2010    Wears glasses        Past Surgical History:  Past Surgical History:   Procedure Laterality Date    HX ANKLE FRACTURE TX  10/20/2008    HX ORTHOPAEDIC      Several surgeries in the past which required ORIF of the distal tibia posterior approach and bone grafting x2 separate surgeries    [place] ANESTH,SURGERY OF SHOULDER      11/27/17       Family History:  Family History   Problem Relation Age of Onset   [place] Migraines Mother     Hypertension Other     Arthritis-osteo Other        Social History:  Social History     Tobacco Use    Smoking status: Current Every Day Smoker     Packs/day: 1.50 Types: Cigarettes    Smokeless tobacco: Never Used   Substance Use Topics    Alcohol use: Yes     Alcohol/week: 0.8 - 5.0 standard drinks     Types: 1 - 6 Cans of beer per week    Drug use: No       Allergies: Allergies   Allergen Reactions    Niacin Rash         Review of Systems   Constitutional: Negative for chills and fever. HENT: Negative for congestion, rhinorrhea and sore throat. Respiratory: Negative for cough and shortness of breath. Cardiovascular: Positive for leg swelling. Negative for chest pain and palpitations. Gastrointestinal: Negative for abdominal pain, nausea and vomiting. Musculoskeletal: Negative for back pain and myalgias. Skin: Positive for color change. Allergic/Immunologic: Negative. Neurological: Negative for dizziness, weakness, light-headedness, numbness and headaches. Psychiatric/Behavioral: Negative. Physical Exam  Vitals signs and nursing note reviewed. Constitutional:       General: He is not in acute distress. Appearance: He is well-developed. He is not ill-appearing, toxic-appearing or diaphoretic. HENT:      Head: Normocephalic and atraumatic. Right Ear: External ear normal.      Left Ear: External ear normal.      Nose: Nose normal.   Eyes:      General: Lids are normal. No scleral icterus. Conjunctiva/sclera: Conjunctivae normal.   Neck:      Musculoskeletal: Normal range of motion and neck supple. Cardiovascular:      Rate and Rhythm: Normal rate and regular rhythm. Pulses:           Dorsalis pedis pulses are 2+ on the left side. Heart sounds: Normal heart sounds. Pulmonary:      Effort: Pulmonary effort is normal. No respiratory distress. Breath sounds: Normal breath sounds. Abdominal:      Palpations: Abdomen is soft. Tenderness: There is no tenderness. There is no rebound. Musculoskeletal: Normal range of motion. Feet:      Left foot:      Skin integrity: No ulcer, blister or skin breakdown. Comments: Pink discoloration of Lt foot and lower leg. No warmth, erythema, induration. No calf tenderness  Skin:     General: Skin is warm. Comments: Pink discoloration LLE, Lt foot   Neurological:      Mental Status: He is alert and oriented to person, place, and time. Motor: No abnormal muscle tone. Psychiatric:         Speech: Speech normal.         Behavior: Behavior normal. Behavior is cooperative. Thought Content: Thought content normal.         Judgment: Judgment normal.          Diagnostic Study Results     Labs -     Recent Results (from the past 12 hour(s))   CBC WITH AUTOMATED DIFF    Collection Time: 12/04/19  6:05 PM   Result Value Ref Range    WBC 5.4 4.6 - 13.2 K/uL    RBC 3.97 (L) 4.70 - 5.50 M/uL    HGB 12.6 (L) 13.0 - 16.0 g/dL    HCT 37.0 36.0 - 48.0 %    MCV 93.2 74.0 - 97.0 FL    MCH 31.7 24.0 - 34.0 PG    MCHC 34.1 31.0 - 37.0 g/dL    RDW 15.7 (H) 11.6 - 14.5 %    PLATELET 837 724 - 283 K/uL    MPV 9.9 9.2 - 11.8 FL    NEUTROPHILS 55 40 - 73 %    LYMPHOCYTES 28 21 - 52 %    MONOCYTES 14 (H) 3 - 10 %    EOSINOPHILS 2 0 - 5 %    BASOPHILS 1 0 - 2 %    ABS. NEUTROPHILS 3.0 1.8 - 8.0 K/UL    ABS. LYMPHOCYTES 1.5 0.9 - 3.6 K/UL    ABS. MONOCYTES 0.7 0.05 - 1.2 K/UL    ABS. EOSINOPHILS 0.1 0.0 - 0.4 K/UL    ABS. BASOPHILS 0.1 0.0 - 0.1 K/UL    DF AUTOMATED     METABOLIC PANEL, COMPREHENSIVE    Collection Time: 12/04/19  6:05 PM   Result Value Ref Range    Sodium 128 (L) 136 - 145 mmol/L    Potassium 3.4 (L) 3.5 - 5.5 mmol/L    Chloride 93 (L) 100 - 111 mmol/L    CO2 25 21 - 32 mmol/L    Anion gap 10 3.0 - 18 mmol/L    Glucose 89 74 - 99 mg/dL    BUN 2 (L) 7.0 - 18 MG/DL    Creatinine 0.41 (L) 0.6 - 1.3 MG/DL    BUN/Creatinine ratio 5 (L) 12 - 20      GFR est AA >60 >60 ml/min/1.73m2    GFR est non-AA >60 >60 ml/min/1.73m2    Calcium 8.2 (L) 8.5 - 10.1 MG/DL    Bilirubin, total 0.7 0.2 - 1.0 MG/DL    ALT (SGPT) 56 16 - 61 U/L    AST (SGOT) 127 (H) 10 - 38 U/L    Alk. phosphatase 227 (H) 45 - 117 U/L    Protein, total 7.6 6.4 - 8.2 g/dL    Albumin 2.9 (L) 3.4 - 5.0 g/dL    Globulin 4.7 (H) 2.0 - 4.0 g/dL    A-G Ratio 0.6 (L) 0.8 - 1.7     SED RATE (ESR)    Collection Time: 12/04/19  6:05 PM   Result Value Ref Range    Sed rate, automated 9 0 - 20 mm/hr   ANKLE BRACHIAL INDEX    Collection Time: 12/04/19  7:40 PM   Result Value Ref Range    Left arm  mmHg    Left posterior tibial 173 mmHg    Left anterior tibial 169 mmHg    Right arm  mmHg    Right posterior tibial 184 mmHg    Right anterior tibial 174 mmHg    Left NICKI 1.18     Right NICKI 1.25      Venous duplex LLE-  No acute DVT. Old DVT in Lt femoral vein    Radiologic Studies -   No orders to display     CT Results  (Last 48 hours)    None        CXR Results  (Last 48 hours)    None          Vital Signs-Reviewed the patient's vital signs. Patient Vitals for the past 12 hrs:   Temp Pulse Resp BP SpO2   12/04/19 1734 97.9 °F (36.6 °C) (!) 109 16 120/83 97 %       I reviewed the vital signs, available nursing notes, past medical history, past surgical history, family history and social history. Provider Notes (Medical Decision Making):   Sent by Dr. Manasa Varma for evaluation of LLE swelling, pink discoloration for 10 days. Dr. Manasa Varma requests venous duplex and arterial duplex, CBC, CRP, ESR, BMP.    8:00 PM  Spoke with Dr. Albertina Garcia on call for Dr. Manasa Varma. Discussed results with him, normal vascular studies, WBC count normal, recommend follow up in clinic next week. Diagnosis     Clinical Impression:   1. Pain and swelling of left lower leg        Disposition:  Home    PLAN:  1. Current Discharge Medication List        2.    Follow-up Information     Follow up With Specialties Details [facility] [place] [address] EMERGENCY DEPT Emergency Medicine  If symptoms worsen, As needed [id number]    Jessica Martinez MD Orthopedic Surgery Schedule an appointment as soon as possible for a visit in 3 days for re-evaluation [address]  [phone]          3. Return to ED if worse   The patient will be discharged home. Warning signs of worsening condition were discussed and the patient verbalized understanding. Based on patient's age, coexisting illness, exam, and the results of this ED evaluation, the decision to treat as an outpatient was made. While it is impossible to completely exclude the possibility of underlying serious disease or worsening of condition, I feel the relative likelihood is extremely low. I discussed this uncertainty with the patient, who understood ED evaluation and treatment and felt comfortable with the outpatient treatment plan. All questions regarding care, test results, and follow up were answered. The patient is stable and appropriate to discharge. Patient understands importance to return to the emergency department for any new or worsening symptoms. I stressed the importance of follow up for repeat assessment and possibly further evaluation/treatment.         Clementine Trinh PA-C

## 2019-12-04 NOTE — PROGRESS NOTES
1. Have you been to the ER, urgent care clinic since your last visit? Hospitalized since your last visit? No    2. Have you seen or consulted any other health care providers outside of the [address] [place] [address] since your last visit? Include any pap smears or colon screening.  No

## 2019-12-04 NOTE — ED NOTES
Left lower leg swelling x 1 week. Right lower leg swelling x \"a few days\"    DP pulses present b/l. Pitting edema to left foot. I performed a brief evaluation, including history and physical, of the patient here in triage and I have determined that pt will need further treatment and evaluation from the main side ER physician. I have placed initial orders to help in expediting patients care.      December 04, 2019 at 5:35 PM - EMILIANO Johnson

## 2019-12-05 LAB
LEFT ABI: 1.18
LEFT ANTERIOR TIBIAL: 169 MMHG
LEFT ARM BP: 146 MMHG
LEFT POSTERIOR TIBIAL: 173 MMHG
RIGHT ABI: 1.25
RIGHT ANTERIOR TIBIAL: 174 MMHG
RIGHT ARM BP: 147 MMHG
RIGHT POSTERIOR TIBIAL: 184 MMHG

## 2019-12-05 NOTE — DISCHARGE INSTRUCTIONS
Patient Education   Elevate your leg to help reduce swelling. Follow up with Dr. Ronan Huston next week     Leg Pain: Care Instructions  Your Care Instructions  Many things can cause leg pain. Too much exercise or overuse can cause a muscle cramp (or charley horse). You can get leg cramps from not eating a balanced diet that has enough potassium, calcium, and other minerals. If you do not drink enough fluids or are taking certain medicines, you may develop leg cramps. Other causes of leg pain include injuries, blood flow problems, nerve damage, and twisted and enlarged veins (varicose veins). You can usually ease pain with self-care. Your doctor may recommend that you rest your leg and keep it elevated. Follow-up care is a key part of your treatment and safety. Be sure to make and go to all appointments, and call your doctor if you are having problems. It's also a good idea to know your test results and keep a list of the medicines you take. How can you care for yourself at home? · Take pain medicines exactly as directed. ? If the doctor gave you a prescription medicine for pain, take it as prescribed. ? If you are not taking a prescription pain medicine, ask your doctor if you can take an over-the-counter medicine. · Take any other medicines exactly as prescribed. Call your doctor if you think you are having a problem with your medicine. · Rest your leg while you have pain, and avoid standing for long periods of time. · Prop up your leg at or above the level of your heart when possible. · Make sure you are eating a balanced diet that is rich in calcium, potassium, and magnesium, especially if you are pregnant. · If directed by your doctor, put ice or a cold pack on the area for 10 to 20 minutes at a time. Put a thin cloth between the ice and your skin. · Your leg may be in a splint, a brace, or an elastic bandage, and you may have crutches to help you walk.  Follow your doctor's directions about how long to wear supports and how to use the crutches. When should you call for help? Call 911 anytime you think you may need emergency care. For example, call if:    · You have sudden chest pain and shortness of breath, or you cough up blood.     · Your leg is cool or pale or changes color.    Call your doctor now or seek immediate medical care if:    · You have increasing or severe pain.     · Your leg suddenly feels weak and you cannot move it.     · You have signs of a blood clot, such as:  ? Pain in your calf, back of the knee, thigh, or groin. ? Redness and swelling in your leg or groin.     · You have signs of infection, such as:  ? Increased pain, swelling, warmth, or redness. ? Red streaks leading from the sore area. ? Pus draining from a place on your leg. ? A fever.     · You cannot bear weight on your leg.    Watch closely for changes in your health, and be sure to contact your doctor if:    · You do not get better as expected. Where can you learn more? Go to http://ema-cristian.info/. Enter Z097 in the search box to learn more about \"Leg Pain: Care Instructions. \"  Current as of: June 26, 2019  Content Version: 12.2  © 4972-9544 [employer]. Care instructions adapted under license by [employer] (which disclaims liability or warranty for this information). If you have questions about a medical condition or this instruction, always ask your healthcare professional. [place] [address] any warranty or liability for your use of this information. Patient Education        Hyponatremia: Care Instructions  Your Care Instructions    Hyponatremia (say \"qk-va-ewp-TREE-mei-uh\") means that you don't have enough sodium in your blood. It can cause nausea, vomiting, and headaches. Or you may not feel hungry. In serious cases, it can cause seizures, a coma, or even death. Hyponatremia is not a disease.  It is a problem caused by something else, such as medicines or exercising for a long time in hot weather. You can get hyponatremia if you lose a lot of fluids and then you drink a lot of water or other liquids that don't have much sodium. You can also get it if you have kidney, liver, heart, or other health problems. Treatment is focused on getting your sodium levels back to normal.  Follow-up care is a key part of your treatment and safety. Be sure to make and go to all appointments, and call your doctor if you are having problems. It's also a good idea to know your test results and keep a list of the medicines you take. How can you care for yourself at home? · If your doctor recommends it, drink fluids that have sodium. Sports drinks are a good choice. Or you can eat salty foods. · If your doctor recommends it, limit the amount of water you drink. And limit fluids that are mostly water. These include tea, coffee, and juice. · Take your medicines exactly as prescribed. Call your doctor if you have any problems with your medicine. · Get your sodium levels tested when your doctor tells you to. When should you call for help? Call 911 anytime you think you may need emergency care. For example, call if:    · You have a seizure.     · You passed out (lost consciousness).    Call your doctor now or seek immediate medical care if:    · You are confused or it is hard to focus.     · You have little or no appetite.     · You feel sick to your stomach or you vomit.     · You have a headache.     · You have mood changes.     · You feel more tired than usual.    Watch closely for changes in your health, and be sure to contact your doctor if:    · You do not get better as expected. Where can you learn more? Go to http://ema-cristian.info/. Enter S576 in the search box to learn more about \"Hyponatremia: Care Instructions. \"  Current as of: March 28, 2019  Content Version: 12.2  © 8110-6448 [employer].  Care instructions adapted under license by [employer] (which disclaims liability or warranty for this information). If you have questions about a medical condition or this instruction, always ask your healthcare professional. [address] any warranty or liability for your use of this information.

## 2019-12-10 DIAGNOSIS — I87.099 CHRONIC VENOUS HYPERTENSION DUE TO DEEP VEIN THROMBOSIS (DVT): Primary | ICD-10-CM

## 2019-12-17 ENCOUNTER — OFFICE VISIT (OUTPATIENT)
Dept: VASCULAR SURGERY | Age: 64
End: 2019-12-17

## 2019-12-17 VITALS
WEIGHT: 114 LBS | RESPIRATION RATE: 17 BRPM | BODY MASS INDEX: 15.44 KG/M2 | SYSTOLIC BLOOD PRESSURE: 132 MMHG | DIASTOLIC BLOOD PRESSURE: 80 MMHG | HEIGHT: 72 IN

## 2019-12-17 DIAGNOSIS — I87.099 CHRONIC VENOUS HYPERTENSION DUE TO DEEP VEIN THROMBOSIS (DVT): Primary | ICD-10-CM

## 2019-12-17 DIAGNOSIS — M79.89 LEFT LEG SWELLING: ICD-10-CM

## 2019-12-17 RX ORDER — GUAIFENESIN 100 MG/5ML
81 LIQUID (ML) ORAL DAILY
Qty: 1 TAB | Refills: 0
Start: 2019-12-17 | End: 2021-06-08

## 2019-12-17 NOTE — PROGRESS NOTES
Maryann Brannon    Chief Complaint   Patient presents with    New Patient    Blood Clot       History and Physical    Maryann Brannon, 60 y.o. male was sent recently to the ED after being sent by Dr. Randa Parmar' office due to left lower extremity swelling, redness, and pain. Patient has a history of a left ankle injury and repair by Dr. Randa Parmar in 2011. Patient reported his wife noticed the discoloration and swelling of his leg. He stated the swelling and redness had been unchanged for the past 10 days. No worsening pain to LLE, no coldness to LLE per pt. Dr Randa Parmar had requested arterial and venous evaluations which were done and with no acute findings other than chronic dvt findings was then just suggested to follow up with vascular     Of note, he was started on eliquis from the ER  He was unaware of any DVT diagnosis historically, but with the history of multiple surgeries on the left leg it could have correlated to any one of those     Past Medical History:   Diagnosis Date    Ankle fracture, left 10/19/2008    Cough     DJD (degenerative joint disease) of knee 11/10/2010    ED (erectile dysfunction) 11/10/2010    Fracture of left tibia 10/19/2008    Severe Grade IIIB    GERD (gastroesophageal reflux disease) 11/10/2010    denies    Hearing loss     Left ankle pain     Left ankle sprain     Left ankle strain     Pain with urination     Sinusitis 11/10/2010    Wears glasses      Patient Active Problem List   Diagnosis Code    GERD (gastroesophageal reflux disease) K21.9    Sinusitis J32.9    ED (erectile dysfunction) N52.9    DJD (degenerative joint disease) of knee M17.10    Fall from standing W19. Dustin [place]    Rib pain on left side R07.81     Past Surgical History:   Procedure Laterality Date    HX ANKLE FRACTURE TX  10/20/2008    HX ORTHOPAEDIC      Several surgeries in the past which required ORIF of the distal tibia posterior approach and bone grafting x2 separate surgeries    ID ANESTH,SURGERY OF SHOULDER      11/27/17     Current Outpatient Medications   Medication Sig Dispense Refill    aspirin 81 mg chewable tablet Take 1 Tab by mouth daily. 1 Tab 0    ANORO ELLIPTA 62.5-25 mcg/actuation inhaler inhale 1 puff by mouth once daily 1 Inhaler 3     Allergies   Allergen Reactions    Niacin Rash     Social History     Socioeconomic History    Marital status:      Spouse name: Not on file    Number of children: Not on file    Years of education: Not on file    Highest education level: Not on file   Occupational History    Not on file   Social Needs    Financial resource strain: Not on file    Food insecurity:     Worry: Not on file     Inability: Not on file    Transportation needs:     Medical: Not on file     Non-medical: Not on file   Tobacco Use    Smoking status: Current Every Day Smoker     Packs/day: 1.50     Types: Cigarettes    Smokeless tobacco: Never Used   Substance and Sexual Activity    Alcohol use:  Yes     Alcohol/week: 0.8 - 5.0 standard drinks     Types: 1 - 6 Cans of beer per week    Drug use: No    Sexual activity: Not Currently   Lifestyle    Physical activity:     Days per week: Not on file     Minutes per session: Not on file    Stress: Not on file   Relationships    Social connections:     Talks on phone: Not on file     Gets together: Not on file     Attends Zoroastrianism service: Not on file     Active member of club or organization: Not on file     Attends meetings of clubs or organizations: Not on file     Relationship status: Not on file    Intimate partner violence:     Fear of current or ex partner: Not on file     Emotionally abused: Not on file     Physically abused: Not on file     Forced sexual activity: Not on file   Other Topics Concern    Not on file   Social History Narrative    Not on file      Family History   Problem Relation Age of Onset    Migraines Mother     Hypertension Other     Arthritis-osteo Other Review of Systems    Review of Systems - History obtained from chart review and the patient  Constitutional: Negative for chills and fever. HENT: Negative for congestion, rhinorrhea and sore throat. Respiratory: Negative for cough and shortness of breath. Cardiovascular: Positive for left leg swelling. Negative for chest pain and palpitations. Gastrointestinal: Negative for abdominal pain, nausea and vomiting. Musculoskeletal: Negative for back pain and myalgias. Skin: Positive for reported color changes of left leg per HPI  Allergic/Immunologic: Negative. Neurological: Negative for dizziness, weakness, light-headedness, numbness and headaches. Psychiatric/Behavioral: Negative. Physical Exam:    Visit Vitals  /80 (BP 1 Location: Left arm, BP Patient Position: Sitting)   Resp 17   Ht 6' (1.829 m)   Wt 114 lb (51.7 kg)   BMI 15.46 kg/m²      General:  Alert, cooperative, no distress. Head:  Normocephalic, without obvious abnormality, atraumatic. Eyes:    Conjunctivae/corneas clear. Pupils equal, round, reactive to light. Extraocular movements intact. Extremities: 1-2+ edema both legs but with slightly worse edema on the left  Few small reticular and spider veins   Pulses: Not able to palpate due to edema but no signs of arterial insufficiency   Skin: Stasis type hyperpigmentation of left leg, with some lipodermatosclerosis and dry skin. But no signs of cellulitis and no ulcerations      Vascular studies:  NICKI     The right anterior tibial artery and posterior tibial artery has triphasic waveforms. The left anterior tibial artery and posterior tibial artery has triphasic waveforms     Chronic occlusive thrombus noted within the left femoral vein  Chronic nonocclusive thrombus noted within the left popliteal vein  No evidence of acute DVT within the left lower extremity or right common femoral vein      Impression and Plan:  1.  Chronic venous hypertension due to deep vein thrombosis (DVT)    2. Left leg swelling      Orders Placed This Encounter    aspirin 81 mg chewable tablet     I explained/defined chronic DVT  This in fact does not warrant anticoagulation so I stated he could d/c that and just take daily aspirin  I explained the phenomenon of post phlebitic syndrome and provided literature on overall venous insufficiency conditions  I explained patients can have flare ups of swelling and the associated heaviness/discomfort and the skin inflammation/skin changes  I stressed importance of a daily routine of compression/elevation/ROM exercises/skin care. We provided him with tubi  sleeves today and some specific suggestions on brands of compression socks he could acquire over the counter    If he initiates these suggestions and is compliant with them he can better manage his symptoms and prevent issues. If ineffective or has concerns of worsening symptoms to let us know and we can re-evaluate either with ultrasound or evaluate to see if he would need compression wraps     Follow-up and Dispositions    · Return if symptoms worsen or fail to improve. EMILIANO Mendiola    Portions of this note have been created using voice recognition software.

## 2020-03-16 DIAGNOSIS — R06.02 SOB (SHORTNESS OF BREATH): ICD-10-CM

## 2020-03-16 RX ORDER — UMECLIDINIUM BROMIDE AND VILANTEROL TRIFENATATE 62.5; 25 UG/1; UG/1
POWDER RESPIRATORY (INHALATION)
Qty: 1 INHALER | Refills: 1 | Status: SHIPPED | OUTPATIENT
Start: 2020-03-16 | End: 2020-05-13

## 2020-04-21 ENCOUNTER — TELEPHONE (OUTPATIENT)
Dept: FAMILY MEDICINE CLINIC | Age: 65
End: 2020-04-21

## 2020-04-21 DIAGNOSIS — S42.292A OTHER CLOSED DISPLACED FRACTURE OF PROXIMAL END OF LEFT HUMERUS, INITIAL ENCOUNTER: ICD-10-CM

## 2020-04-21 RX ORDER — CYCLOBENZAPRINE HCL 10 MG
10 TABLET ORAL
Qty: 60 TAB | Refills: 0 | Status: SHIPPED | OUTPATIENT
Start: 2020-04-21 | End: 2020-08-20

## 2020-04-21 NOTE — TELEPHONE ENCOUNTER
Please contact patient for scheduling. Thanks        Last visit 03/13/2019 MD [facility]   Next appointment None   Previous refill encounter(s) 05/22/2018 Flexeril #[id number]     Requested Prescriptions     Pending Prescriptions Disp Refills    cyclobenzaprine (FLEXERIL) 10 mg tablet 60 Tab 0     Sig: Take 1 Tab by mouth three (3) times daily as needed for Muscle Spasm(s).

## 2020-04-22 DIAGNOSIS — R06.02 SOB (SHORTNESS OF BREATH): ICD-10-CM

## 2020-05-13 DIAGNOSIS — R06.02 SOB (SHORTNESS OF BREATH): ICD-10-CM

## 2020-05-13 RX ORDER — UMECLIDINIUM BROMIDE AND VILANTEROL TRIFENATATE 62.5; 25 UG/1; UG/1
POWDER RESPIRATORY (INHALATION)
Qty: 1 INHALER | Refills: 0 | Status: SHIPPED | OUTPATIENT
Start: 2020-05-13 | End: 2020-06-20

## 2020-06-20 DIAGNOSIS — R06.02 SOB (SHORTNESS OF BREATH): ICD-10-CM

## 2020-06-20 RX ORDER — UMECLIDINIUM BROMIDE AND VILANTEROL TRIFENATATE 62.5; 25 UG/1; UG/1
POWDER RESPIRATORY (INHALATION)
Qty: 1 INHALER | Refills: 0 | Status: SHIPPED | OUTPATIENT
Start: 2020-06-20 | End: 2020-07-10 | Stop reason: SDUPTHER

## 2020-06-23 NOTE — TELEPHONE ENCOUNTER
Pt is asking Dr. Chery Butts if he can come into office for the visit instead of the phone. Says he has a few different things to discuss and thinks it would be better in person.

## 2020-06-30 ENCOUNTER — HOSPITAL ENCOUNTER (OUTPATIENT)
Dept: GENERAL RADIOLOGY | Age: 65
Discharge: HOME | End: 2020-06-30
Payer: COMMERCIAL

## 2020-06-30 ENCOUNTER — OFFICE VISIT (OUTPATIENT)
Dept: INTERNAL MEDICINE CLINIC | Age: 65
End: 2020-06-30

## 2020-06-30 VITALS
OXYGEN SATURATION: 98 % | WEIGHT: 113 LBS | RESPIRATION RATE: 20 BRPM | HEIGHT: 72 IN | BODY MASS INDEX: 15.31 KG/M2 | HEART RATE: 87 BPM | DIASTOLIC BLOOD PRESSURE: 80 MMHG | SYSTOLIC BLOOD PRESSURE: 146 MMHG | TEMPERATURE: 98.3 F

## 2020-06-30 DIAGNOSIS — M54.42 CHRONIC MIDLINE LOW BACK PAIN WITH BILATERAL SCIATICA: ICD-10-CM

## 2020-06-30 DIAGNOSIS — M54.41 CHRONIC MIDLINE LOW BACK PAIN WITH BILATERAL SCIATICA: ICD-10-CM

## 2020-06-30 DIAGNOSIS — G89.29 CHRONIC MIDLINE LOW BACK PAIN WITH BILATERAL SCIATICA: ICD-10-CM

## 2020-06-30 DIAGNOSIS — J43.2 CENTRILOBULAR EMPHYSEMA (HCC): ICD-10-CM

## 2020-06-30 DIAGNOSIS — Z00.00 PHYSICAL EXAM: Primary | ICD-10-CM

## 2020-06-30 PROCEDURE — 71046 X-RAY EXAM CHEST 2 VIEWS: CPT

## 2020-06-30 PROCEDURE — 72100 X-RAY EXAM L-S SPINE 2/3 VWS: CPT

## 2020-06-30 RX ORDER — PREDNISONE 10 MG/1
TABLET ORAL
Qty: 25 TAB | Refills: 0 | Status: SHIPPED | OUTPATIENT
Start: 2020-06-30 | End: 2020-08-05

## 2020-06-30 NOTE — PATIENT INSTRUCTIONS
High Blood Pressure: Care Instructions Overview It's normal for blood pressure to go up and down throughout the day. But if it stays up, you have high blood pressure. Another name for high blood pressure is hypertension. Despite what a lot of people think, high blood pressure usually doesn't cause headaches or make you feel dizzy or lightheaded. It usually has no symptoms. But it does increase your risk of stroke, heart attack, and other problems. You and your doctor will talk about your risks of these problems based on your blood pressure. Your doctor will give you a goal for your blood pressure. Your goal will be based on your health and your age. Lifestyle changes, such as eating healthy and being active, are always important to help lower blood pressure. You might also take medicine to reach your blood pressure goal. 
Follow-up care is a key part of your treatment and safety. Be sure to make and go to all appointments, and call your doctor if you are having problems. It's also a good idea to know your test results and keep a list of the medicines you take. How can you care for yourself at home? Medical treatment · If you stop taking your medicine, your blood pressure will go back up. You may take one or more types of medicine to lower your blood pressure. Be safe with medicines. Take your medicine exactly as prescribed. Call your doctor if you think you are having a problem with your medicine. · Talk to your doctor before you start taking aspirin every day. Aspirin can help certain people lower their risk of a heart attack or stroke. But taking aspirin isn't right for everyone, because it can cause serious bleeding. · See your doctor regularly. You may need to see the doctor more often at first or until your blood pressure comes down. · If you are taking blood pressure medicine, talk to your doctor before you take decongestants or anti-inflammatory medicine, such as ibuprofen. Some of these medicines can raise blood pressure. · Learn how to check your blood pressure at home. Lifestyle changes · Stay at a healthy weight. This is especially important if you put on weight around the waist. Losing even 10 pounds can help you lower your blood pressure. · If your doctor recommends it, get more exercise. Walking is a good choice. Bit by bit, increase the amount you walk every day. Try for at least 30 minutes on most days of the week. You also may want to swim, bike, or do other activities. · Avoid or limit alcohol. Talk to your doctor about whether you can drink any alcohol. · Try to limit how much sodium you eat to less than 2,300 milligrams (mg) a day. Your doctor may ask you to try to eat less than 1,500 mg a day. · Eat plenty of fruits (such as bananas and oranges), vegetables, legumes, whole grains, and low-fat dairy products. · Lower the amount of saturated fat in your diet. Saturated fat is found in animal products such as milk, cheese, and meat. Limiting these foods may help you lose weight and also lower your risk for heart disease. · Do not smoke. Smoking increases your risk for heart attack and stroke. If you need help quitting, talk to your doctor about stop-smoking programs and medicines. These can increase your chances of quitting for good. When should you call for help? Call  911 anytime you think you may need emergency care. This may mean having symptoms that suggest that your blood pressure is causing a serious heart or blood vessel problem. Your blood pressure may be over 180/120. For example, call 911 if: 
· You have symptoms of a heart attack. These may include: 
? Chest pain or pressure, or a strange feeling in the chest. 
? Sweating. ? Shortness of breath. ? Nausea or vomiting. ? Pain, pressure, or a strange feeling in the back, neck, jaw, or upper belly or in one or both shoulders or arms. ? Lightheadedness or sudden weakness. ? A fast or irregular heartbeat. · You have symptoms of a stroke. These may include: 
? Sudden numbness, tingling, weakness, or loss of movement in your face, arm, or leg, especially on only one side of your body. ? Sudden vision changes. ? Sudden trouble speaking. ? Sudden confusion or trouble understanding simple statements. ? Sudden problems with walking or balance. ? A sudden, severe headache that is different from past headaches. · You have severe back or belly pain. Do not wait until your blood pressure comes down on its own. Get help right away. Call your doctor now or seek immediate care if: 
· Your blood pressure is much higher than normal (such as 180/120 or higher), but you don't have symptoms. · You think high blood pressure is causing symptoms, such as: 
? Severe headache. 
? Blurry vision. Watch closely for changes in your health, and be sure to contact your doctor if: 
· Your blood pressure measures higher than your doctor recommends at least 2 times. That means the top number is higher or the bottom number is higher, or both. · You think you may be having side effects from your blood pressure medicine. Where can you learn more? Go to http://ema-cristian.info/ Enter M870 in the search box to learn more about \"High Blood Pressure: Care Instructions. \" Current as of: December 16, 2019               Content Version: 12.5 © 0386-4750 [employer]. Care instructions adapted under license by [employer] (which disclaims liability or warranty for this information). If you have questions about a medical condition or this instruction, always ask your healthcare professional. [place] [address] any warranty or liability for your use of this information. Low Back Pain: Exercises Introduction Here are some examples of exercises for you to try.  The exercises may be suggested for a condition or for rehabilitation. Start each exercise slowly. Ease off the exercises if you start to have pain. You will be told when to start these exercises and which ones will work best for you. How to do the exercises Press-up 1. Lie on your stomach, supporting your body with your forearms. 2. Press your elbows down into the floor to raise your upper back. As you do this, relax your stomach muscles and allow your back to arch without using your back muscles. As your press up, do not let your hips or pelvis come off the floor. 3. Hold for 15 to 30 seconds, then relax. 4. Repeat 2 to 4 times. Alternate arm and leg (bird dog) exercise Do this exercise slowly. Try to keep your body straight at all times, and do not let one hip drop lower than the other. 1. Start on the floor, on your hands and knees. 2. Tighten your belly muscles. 3. Raise one leg off the floor, and hold it straight out behind you. Be careful not to let your hip drop down, because that will twist your trunk. 4. Hold for about 6 seconds, then lower your leg and switch to the other leg. 5. Repeat 8 to 12 times on each leg. 6. Over time, work up to holding for 10 to 30 seconds each time. 7. If you feel stable and secure with your leg raised, try raising the opposite arm straight out in front of you at the same time. Knee-to-chest exercise 1. Lie on your back with your knees bent and your feet flat on the floor. 2. Bring one knee to your chest, keeping the other foot flat on the floor (or keeping the other leg straight, whichever feels better on your lower back). 3. Keep your lower back pressed to the floor. Hold for at least 15 to 30 seconds. 4. Relax, and lower the knee to the starting position. 5. Repeat with the other leg. Repeat 2 to 4 times with each leg. 6. To get more stretch, put your other leg flat on the floor while pulling your knee to your chest.   
Curl-ups 1. Lie on the floor on your back with your knees bent at a 90-degree angle. Your feet should be flat on the floor, about 12 inches from your buttocks. 2. Cross your arms over your chest. If this bothers your neck, try putting your hands behind your neck (not your head), with your elbows spread apart. 3. Slowly tighten your belly muscles and raise your shoulder blades off the floor. 4. Keep your head in line with your body, and do not press your chin to your chest. 
5. Hold this position for 1 or 2 seconds, then slowly lower yourself back down to the floor. 6. Repeat 8 to 12 times. Pelvic tilt exercise 1. Lie on your back with your knees bent. 2. \"Brace\" your stomach. This means to tighten your muscles by pulling in and imagining your belly button moving toward your spine. You should feel like your back is pressing to the floor and your hips and pelvis are rocking back. 3. Hold for about 6 seconds while you breathe smoothly. 4. Repeat 8 to 12 times. Heel dig bridging 1. Lie on your back with both knees bent and your ankles bent so that only your heels are digging into the floor. Your knees should be bent about 90 degrees. 2. Then push your heels into the floor, squeeze your buttocks, and lift your hips off the floor until your shoulders, hips, and knees are all in a straight line. 3. Hold for about 6 seconds as you continue to breathe normally, and then slowly lower your hips back down to the floor and rest for up to 10 seconds. 4. Do 8 to 12 repetitions. Hamstring stretch in doorway 1. Lie on your back in a doorway, with one leg through the open door. 2. Slide your leg up the wall to straighten your knee. You should feel a gentle stretch down the back of your leg. 3. Hold the stretch for at least 15 to 30 seconds. Do not arch your back, point your toes, or bend either knee. Keep one heel touching the floor and the other heel touching the wall. 4. Repeat with your other leg. 5. Do 2 to 4 times for each leg. Hip flexor stretch 1. Kneel on the floor with one knee bent and one leg behind you. Place your forward knee over your foot. Keep your other knee touching the floor. 2. Slowly push your hips forward until you feel a stretch in the upper thigh of your rear leg. 3. Hold the stretch for at least 15 to 30 seconds. Repeat with your other leg. 4. Do 2 to 4 times on each side. Wall sit 1. Stand with your back 10 to 12 inches away from a wall. 2. Lean into the wall until your back is flat against it. 3. Slowly slide down until your knees are slightly bent, pressing your lower back into the wall. 4. Hold for about 6 seconds, then slide back up the wall. 5. Repeat 8 to 12 times. Follow-up care is a key part of your treatment and safety. Be sure to make and go to all appointments, and call your doctor if you are having problems. It's also a good idea to know your test results and keep a list of the medicines you take. Where can you learn more? Go to http://ema-cristian.info/ Enter A327 in the search box to learn more about \"Low Back Pain: Exercises. \" Current as of: March 2, 2020               Content Version: 12.5 © 2006-2020 Healthwise, Incorporated. Care instructions adapted under license by "[employer] (which disclaims liability or warranty for this information). If you have questions about a medical condition or this instruction, always ask your healthcare professional. [place] [address] any warranty or liability for your use of this information.

## 2020-06-30 NOTE — PROGRESS NOTES
Patient is in the office today for a medication refill. Patient states a few months ago he leaned over to  lab top and hurt his lower back. 1. Have you been to the ER, urgent care clinic since your last visit? Hospitalized since your last visit? No    2. Have you seen or consulted any other health care providers outside of the [address] [place] [address] since your last visit? Include any pap smears or colon screening.  No

## 2020-07-01 LAB
A-G RATIO,AGRAT: 1.4 RATIO (ref 1.1–2.6)
ABSOLUTE LYMPHOCYTE COUNT, 10803: 1.1 K/UL (ref 1–4.8)
ALBUMIN SERPL-MCNC: 4.1 G/DL (ref 3.5–5)
ALP SERPL-CCNC: 100 U/L (ref 40–125)
ALT SERPL-CCNC: 27 U/L (ref 5–40)
ANION GAP SERPL CALC-SCNC: 12 MMOL/L
AST SERPL W P-5'-P-CCNC: 58 U/L (ref 10–37)
BASOPHILS # BLD: 0.1 K/UL (ref 0–0.2)
BASOPHILS NFR BLD: 1 % (ref 0–2)
BILIRUB SERPL-MCNC: 0.6 MG/DL (ref 0.2–1.2)
BUN SERPL-MCNC: 3 MG/DL (ref 6–22)
CALCIUM SERPL-MCNC: 9 MG/DL (ref 8.4–10.5)
CHLORIDE SERPL-SCNC: 91 MMOL/L (ref 98–110)
CHOLEST SERPL-MCNC: 138 MG/DL (ref 110–200)
CO2 SERPL-SCNC: 24 MMOL/L (ref 20–32)
CREAT SERPL-MCNC: 0.5 MG/DL (ref 0.8–1.6)
EOSINOPHIL # BLD: 0.2 K/UL (ref 0–0.5)
EOSINOPHIL NFR BLD: 4 % (ref 0–6)
ERYTHROCYTE [DISTWIDTH] IN BLOOD BY AUTOMATED COUNT: 17.5 % (ref 10–15.5)
GFRAA, 66117: >60
GFRNA, 66118: >60
GLOBULIN,GLOB: 2.9 G/DL (ref 2–4)
GLUCOSE SERPL-MCNC: 86 MG/DL (ref 70–99)
GRANULOCYTES,GRANS: 52 % (ref 40–75)
HCT VFR BLD AUTO: 38.8 % (ref 39.3–51.6)
HDLC SERPL-MCNC: 1.7 MG/DL (ref 0–5)
HDLC SERPL-MCNC: 83 MG/DL
HGB BLD-MCNC: 12.1 G/DL (ref 13.1–17.2)
LDL/HDL RATIO,LDHD: 0.5
LDLC SERPL CALC-MCNC: 45 MG/DL (ref 50–99)
LYMPHOCYTES, LYMLT: 24 % (ref 20–45)
MCH RBC QN AUTO: 28 PG (ref 26–34)
MCHC RBC AUTO-ENTMCNC: 31 G/DL (ref 31–36)
MCV RBC AUTO: 89 FL (ref 80–95)
MONOCYTES # BLD: 0.8 K/UL (ref 0.1–1)
MONOCYTES NFR BLD: 18 % (ref 3–12)
NEUTROPHILS # BLD AUTO: 2.3 K/UL (ref 1.8–7.7)
NON-HDL CHOLESTEROL, 011976: 55 MG/DL
PLATELET # BLD AUTO: 225 K/UL (ref 140–440)
PMV BLD AUTO: 10.9 FL (ref 9–13)
POTASSIUM SERPL-SCNC: 4 MMOL/L (ref 3.5–5.5)
PROT SERPL-MCNC: 7 G/DL (ref 6.2–8.1)
PSA SERPL-MCNC: 0.39 NG/ML
RBC # BLD AUTO: 4.36 M/UL (ref 3.8–5.8)
SODIUM SERPL-SCNC: 127 MMOL/L (ref 133–145)
TRIGL SERPL-MCNC: 47 MG/DL (ref 40–149)
VLDLC SERPL CALC-MCNC: 9 MG/DL (ref 8–30)
WBC # BLD AUTO: 4.3 K/UL (ref 4–11)

## 2020-07-05 NOTE — PROGRESS NOTES
.  Subjective:   Truman Eng is a 63 y.o. male presenting for his annual checkup. ROS:  Feeling well. No dyspnea or chest pain on exertion. No abdominal pain, change in bowel habits, black or bloody stools. No urinary tract or prostatic symptoms. No neurological complaints. Specific concerns today: Back Pain  Patient presents for presents evaluation of low back problems. Symptoms have been present for several months and include pain in lower back (severe in character; 8/10 in severity). Initial inciting event: none. Symptoms are worst: morning. Alleviating factors identifiable by patient are recumbency. Exacerbating factors identifiable by patient are standing, walking. Treatments so far initiated by patient: none Previous lower back problems: yes. Previous workup: none. Previous treatments: OTC NSAIDs. Pt unfortunately smokes cigarettes significantly and abuses ETOH and has poor nutrition. These have all contributed to taking toll on his bones especially with his thin frame to start with. Pt reluctant to do PT    Patient on chest x-ray previously has had evidence of COPD. 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  As mentioned above with his continued tobacco use this has led to shortness of breath with exertion. Patient has been using Anoro Ellipta with some improvement. .    Patient Active Problem List   Diagnosis Code    GERD (gastroesophageal reflux disease) K21.9    Sinusitis J32.9    ED (erectile dysfunction) N52.9    DJD (degenerative joint disease) of knee M17.10    Fall from standing W19. Rigo Neda    Rib pain on left side R07.81     Current Outpatient Medications   Medication Sig Dispense Refill    predniSONE (DELTASONE) 10 mg tablet 5 tabs daily for 5 days 25 Tab 0    Anoro Ellipta 62.5-25 mcg/actuation inhaler inhale 1 puff by mouth once daily 1 Inhaler 0    cyclobenzaprine (FLEXERIL) 10 mg tablet Take 1 Tab by mouth three (3) times daily as needed for Muscle Spasm(s). 60 Tab 0    aspirin 81 mg chewable tablet Take 1 Tab by mouth daily. 1 Tab 0        Lab Results   Component Value Date/Time    WBC 4.3 06/30/2020 11:42 AM    HGB 12.1 (L) 06/30/2020 11:42 AM    HCT 38.8 (L) 06/30/2020 11:42 AM    PLATELET 448 54/77/1842 11:42 AM    MCV 89 06/30/2020 11:42 AM     Lab Results   Component Value Date/Time    Cholesterol, total 138 06/30/2020 11:42 AM    HDL Cholesterol 83 06/30/2020 11:42 AM    LDL, calculated 45 (L) 06/30/2020 11:42 AM    Triglyceride 47 06/30/2020 11:42 AM     Lab Results   Component Value Date/Time    Prostate Specific Ag 0.390 06/30/2020 11:42 AM    Prostate Specific Ag 1.100 08/07/2018 10:36 AM    Prostate Specific Ag 2.0 11/10/2010 03:04 PM     Lab Results   Component Value Date/Time    Sodium 127 (L) 06/30/2020 11:42 AM    Potassium 4.0 06/30/2020 11:42 AM    Chloride 91 (L) 06/30/2020 11:42 AM    CO2 24 06/30/2020 11:42 AM    Anion gap 12.0 06/30/2020 11:42 AM    Glucose 86 06/30/2020 11:42 AM    BUN 3 (L) 06/30/2020 11:42 AM    Creatinine 0.5 (L) 06/30/2020 11:42 AM    BUN/Creatinine ratio 5 (L) 12/04/2019 06:05 PM    GFR est AA >60 12/04/2019 06:05 PM    GFR est non-AA >60 12/04/2019 06:05 PM    Calcium 9.0 06/30/2020 11:42 AM    Bilirubin, total 0.6 06/30/2020 11:42 AM    ALT (SGPT) 27 06/30/2020 11:42 AM    Alk. phosphatase 100 06/30/2020 11:42 AM    Protein, total 7.0 06/30/2020 11:42 AM    Albumin 4.1 06/30/2020 11:42 AM    Globulin 2.9 06/30/2020 11:42 AM    A-G Ratio 1.4 06/30/2020 11:42 AM         Objective:     Visit Vitals  /80 (BP 1 Location: Left arm, BP Patient Position: Sitting)   Pulse 87   Temp 98.3 °F (36.8 °C) (Oral)   Resp 20   Ht 6' (1.829 m)   Wt 113 lb (51.3 kg)   SpO2 98%   BMI 15.33 kg/m²     The patient appears well, alert, oriented x 3, in no distress. ENT normal.  Neck supple. No adenopathy or thyromegaly. CONNER. Lungs are clear, good air entry, no wheezes, rhonchi or rales.  S1 and S2 normal, no murmurs, regular rate and rhythm. Abdomen is soft without tenderness, guarding, mass or organomegaly. Extremities show no edema, normal peripheral pulses. Back exam - limited range of motion, pain with motion noted during exam, tenderness noted in paraspinal muscles with palpation. Pt in wheelchair and unable to do leg raise. Pt is frail and cachectic. Assessment/Plan:   healthy adult male  limit alcohol consumption, stop smoking (advice and handout given), routine labs ordered. ICD-10-CM ICD-9-CM    1. Physical exam Z00.00 V70.9 LIPID PANEL      METABOLIC PANEL, COMPREHENSIVE      PSA, DIAGNOSTIC (PROSTATE SPECIFIC AG)      CBC WITH AUTOMATED DIFF      LIPID PANEL      METABOLIC PANEL, COMPREHENSIVE      PSA, DIAGNOSTIC (PROSTATE SPECIFIC AG)      CBC WITH AUTOMATED DIFF   2. Chronic midline low back pain with bilateral sciatica M54.41 724.2  XR SPINE LUMB MIN 4 V and treat with prednisone burst. Pt declines PT. Will give list of home exercises. consider spine center evaluation. M54.42 724.3     G89.29 338.29    3. Centrilobular emphysema (HCC) J43.2 492.8 XR CHEST PA LAT continue Tiana Valerio      Reviewed plan of care. Patient has provided input and agrees with goals. Follow-up and Dispositions    · Return if symptoms worsen or fail to improve.

## 2020-07-10 RX ORDER — UMECLIDINIUM BROMIDE AND VILANTEROL TRIFENATATE 62.5; 25 UG/1; UG/1
POWDER RESPIRATORY (INHALATION)
Qty: 1 INHALER | Refills: 5 | Status: SHIPPED | OUTPATIENT
Start: 2020-07-10 | End: 2021-04-22 | Stop reason: SDUPTHER

## 2020-07-20 ENCOUNTER — TELEPHONE (OUTPATIENT)
Dept: INTERNAL MEDICINE CLINIC | Age: 65
End: 2020-07-20

## 2020-07-20 DIAGNOSIS — M48.54XA NON-TRAUMATIC COMPRESSION FRACTURE OF TWELFTH THORACIC VERTEBRA, INITIAL ENCOUNTER: ICD-10-CM

## 2020-07-20 DIAGNOSIS — M54.16 LUMBAR RADICULOPATHY: Primary | ICD-10-CM

## 2020-07-20 NOTE — TELEPHONE ENCOUNTER
Hermelinda Nunez MD  You 8 minutes ago (2:10 PM)       Try to get him in to spine in 2-3 weeks if possible. He can see anyone       Patient is aware of the following appointment information:  Dr. Yanira Sequeira [phone]  August 5, 2020 at 10:30 am check in at 10:15 am  [address] [facility] [address]    Patient was given phone number to central scheduling to call and schedule MRI.

## 2020-07-20 NOTE — TELEPHONE ENCOUNTER
----- Message from Mannie Mckeon sent at 7/20/2020 10:49 AM EDT -----  Regarding: Test Results  The patient would like his test results mailed to him. He states he can't access [facility].

## 2020-07-20 NOTE — TELEPHONE ENCOUNTER
Patient would like to know what his chest and  back xray results are. Patient states he finished prednisone and his back is getting worse. Patient states he can not walk for long periods and he can not stand for long periods.

## 2020-07-20 NOTE — TELEPHONE ENCOUNTER
Pt aware of Lumbar spine films show compression fractures. He is to got to ER for any increasing leg weakness.  Will do MRI of LS spine and refer to spine specialist ASAP

## 2020-07-31 ENCOUNTER — TELEPHONE (OUTPATIENT)
Dept: INTERNAL MEDICINE CLINIC | Age: 65
End: 2020-07-31

## 2020-07-31 DIAGNOSIS — M48.54XA NON-TRAUMATIC COMPRESSION FRACTURE OF TWELFTH THORACIC VERTEBRA, INITIAL ENCOUNTER: Primary | ICD-10-CM

## 2020-07-31 DIAGNOSIS — M54.16 LUMBAR RADICULOPATHY: ICD-10-CM

## 2020-07-31 NOTE — TELEPHONE ENCOUNTER
Pt wife called said  put orders in for pt to get an MRI , pt was told because he has a bone stimulator in his leg he can not get one inhaled corticosteroids there another test he can get in lieu of this ?  Please advise

## 2020-08-05 ENCOUNTER — OFFICE VISIT (OUTPATIENT)
Dept: ORTHOPEDIC SURGERY | Age: 65
End: 2020-08-05

## 2020-08-05 VITALS
WEIGHT: 115 LBS | SYSTOLIC BLOOD PRESSURE: 128 MMHG | BODY MASS INDEX: 15.58 KG/M2 | HEART RATE: 65 BPM | OXYGEN SATURATION: 98 % | RESPIRATION RATE: 16 BRPM | DIASTOLIC BLOOD PRESSURE: 87 MMHG | HEIGHT: 72 IN | TEMPERATURE: 98 F

## 2020-08-05 DIAGNOSIS — Z87.81 HX OF COMPRESSION FRACTURE OF SPINE: ICD-10-CM

## 2020-08-05 DIAGNOSIS — M51.37 DDD (DEGENERATIVE DISC DISEASE), LUMBOSACRAL: Primary | ICD-10-CM

## 2020-08-05 RX ORDER — BACLOFEN 10 MG/1
5 TABLET ORAL
Qty: 30 TAB | Refills: 0 | Status: SHIPPED | OUTPATIENT
Start: 2020-08-05 | End: 2020-08-20

## 2020-08-05 RX ORDER — KETOROLAC TROMETHAMINE 15 MG/ML
30 INJECTION, SOLUTION INTRAMUSCULAR; INTRAVENOUS ONCE
Qty: 1 VIAL | Refills: 0
Start: 2020-08-05 | End: 2020-08-05

## 2020-08-05 RX ORDER — LIDOCAINE 50 MG/G
1 PATCH TOPICAL EVERY 12 HOURS
Qty: 30 EACH | Refills: 0 | Status: SHIPPED | OUTPATIENT
Start: 2020-08-05 | End: 2020-08-20

## 2020-08-05 NOTE — PROGRESS NOTES
[address], [address]  [facility], [address]  Phone: [phone]  Fax: [phone]        Alban Estimable  : 1955  PCP: Cristhian Weller MD      NEW PATIENT      ASSESSMENT AND PLAN     Diagnoses and all orders for this visit:    1. DDD (degenerative disc disease), lumbosacral  -     KETOROLAC TROMETHAMINE INJ  -     TX THER/PROPH/DIAG INJECTION, SUBCUT/IM    2. Hx of compression fracture of spine  Comments:  Thoracic and lumbar    Other orders  -     ketorolac (TORADOL) 15 mg/mL soln injection; 2 mL by IntraMUSCular route once for 1 dose. -     baclofen (LIORESAL) 10 mg tablet; Take 0.5 Tabs by mouth two (2) times daily as needed for Muscle Spasm(s) or Pain.  -     lidocaine (LIDODERM) 5 %; 1 Patch by TransDERmal route every twelve (12) hours. Apply patch to the affected area for 12 hours a day and remove for 12 hours a day. 1. 63 y.o. male With multiple thoracic and lumbar compression deformities with kyphosis. He has stenosis by imaging, remains neurologically intact. He has had a malignancy work-up which was benign. 2. Advised to stay active as tolerated. 3. DC Flexeril  4. Trial of Lidoderm patches  5. Trial of Baclofen  6. Toradol 30 mg  7. Given information on DDD  8. Declines PT        Follow-up and Dispositions    · Return in 1 month (on 2020). CHIEF COMPLAINT  Luis Antonio Ruiz is seen today in consultation at the request of Dr. Janusz Babcock for complaints of back pain       HISTORY OF PRESENT ILLNESS  Luis Antonio Ruiz is a 63 y.o. male. Today pt c/o back pain of 2 mo duration. Pt denies any specific incident or injury that caused their pain. 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 He notes that he often feels off balance. Reports that steroids had no benefit for pain.  Pt affirms having lost weight since breaking his leg many years ago. He states that he doesn't have an appetite. Pain Assessment  8/5/2020   Location of Pain Back   Location Modifiers (No Data)   Severity of Pain 6   Quality of Pain Other (Comment)   Quality of Pain Comment stabbing   Duration of Pain Persistent   Frequency of Pain Constant   Aggravating Factors Standing;Walking;Stairs   Aggravating Factors Comment -   Limiting Behavior Yes   Relieving Factors Nothing   Relieving Factors Comment -   Result of Injury No   Work-Related Injury -   Type of Injury -       Does pain radiate into extremities: No  Does patient have numbness/tingling: No  Does patient have weakness: No   Pt denies saddle paresthesias. Medications pt is on: Flexeril 10 mg TID. Aspirin. Denies persistent fevers, chills, neurogenic bowel or bladder symptoms. Treatments patient has tried:  Physical therapy:No  Doing HEP: Unknown  Failed medications: Ibuprofen  Spinal injections: No    Spinal surgery- No.   Spine surgery consult: No.     XRs 6/2020: compression deformities T11-12, L3-4. DDD L5-S1     reviewed. PMHx of GERD, OA of knee, L ankle fracture (~10 sxs). Worked on a shipyard for 34 years. Creatinine 0.5 2020.        PAST MEDICAL HISTORY   Past Medical History:   Diagnosis Date    Ankle fracture, left 10/19/2008    Cough     DJD (degenerative joint disease) of knee 11/10/2010    ED (erectile dysfunction) 11/10/2010    Fracture of left tibia 10/19/2008    Severe Grade IIIB    GERD (gastroesophageal reflux disease) 11/10/2010    denies    Hearing loss     Left ankle pain     Left ankle sprain     Left ankle strain     Pain with urination     Sinusitis 11/10/2010    Wears glasses        Past Surgical History:   Procedure Laterality Date    HX ANKLE FRACTURE TX  10/20/2008    HX ORTHOPAEDIC      Several surgeries in the past which required ORIF of the distal tibia posterior approach and bone grafting x2 separate surgeries    NM ANESTH,SURGERY OF SHOULDER      11/27/17 MEDICATIONS      Current Outpatient Medications   Medication Sig Dispense Refill    baclofen (LIORESAL) 10 mg tablet Take 0.5 Tabs by mouth two (2) times daily as needed for Muscle Spasm(s) or Pain. 30 Tab 0    lidocaine (LIDODERM) 5 % 1 Patch by TransDERmal route every twelve (12) hours. Apply patch to the affected area for 12 hours a day and remove for 12 hours a day. 30 Each 0    Anoro Ellipta 62.5-25 mcg/actuation inhaler inhale 1 puff by mouth once daily 1 Inhaler 5    cyclobenzaprine (FLEXERIL) 10 mg tablet Take 1 Tab by mouth three (3) times daily as needed for Muscle Spasm(s). 60 Tab 0    aspirin 81 mg chewable tablet Take 1 Tab by mouth daily. 1 Tab 0       Controlled Substance Monitoring:    No flowsheet data found. ALLERGIES    Allergies   Allergen Reactions    Niacin Rash          SOCIAL HISTORY    Social History     Socioeconomic History    Marital status:      Spouse name: Not on file    Number of children: Not on file    Years of education: Not on file    Highest education level: Not on file   Occupational History    Not on file   Social Needs    Financial resource strain: Not on file    Food insecurity     Worry: Not on file     Inability: Not on file    Transportation needs     Medical: Not on file     Non-medical: Not on file   Tobacco Use    Smoking status: Current Every Day Smoker     Packs/day: 1.50     Types: Cigarettes    Smokeless tobacco: Never Used   Substance and Sexual Activity    Alcohol use:  Yes     Alcohol/week: 0.8 - 5.0 standard drinks     Types: 1 - 6 Cans of beer per week    Drug use: No    Sexual activity: Not Currently   Lifestyle    Physical activity     Days per week: Not on file     Minutes per session: Not on file    Stress: Not on file   Relationships    Social connections     Talks on phone: Not on file     Gets together: Not on file     Attends Advent service: Not on file     Active member of club or organization: Not on file Attends meetings of clubs or organizations: Not on file     Relationship status: Not on file    Intimate partner violence     Fear of current or ex partner: Not on file     Emotionally abused: Not on file     Physically abused: Not on file     Forced sexual activity: Not on file   Other Topics Concern    Not on file   Social History Narrative    Not on file       FAMILY HISTORY    Family History   Problem Relation Age of Onset    Migraines Mother     Hypertension Other     Arthritis-osteo Other          REVIEW OF SYSTEMS  Review of Systems   Constitutional: Positive for weight loss. Negative for chills and fever. Respiratory: Negative for shortness of breath. Cardiovascular: Negative for chest pain. Gastrointestinal: Negative for constipation. Negative for fecal incontinence   Genitourinary: Negative for dysuria. Negative for urinary incontinence   Musculoskeletal: Positive for back pain. Per HPI   Skin: Negative for rash. Neurological: Negative for dizziness, tingling, tremors, focal weakness and headaches. Endo/Heme/Allergies: Does not bruise/bleed easily. Psychiatric/Behavioral: The patient does not have insomnia. PHYSICAL EXAMINATION  Visit Vitals  /87 (BP 1 Location: Left arm, BP Patient Position: Sitting)   Pulse 65   Temp 98 °F (36.7 °C) (Oral)   Resp 16   Ht 6' (1.829 m)   Wt 115 lb (52.2 kg)   SpO2 98%   BMI 15.60 kg/m²          Accompanied by spouse. Constitutional:  Well developed, cachectic, in no acute distress. Psychiatric: Affect and mood are appropriate. Integumentary: No rashes or abrasions noted on exposed areas. Cardiovascular/Peripheral Vascular: Brawny edema LLE      SPINE/MUSCULOSKELETAL EXAM    Kyphotic     Thoracolumbar spine:  No rash, ecchymosis, or gross obliquity. No fasciculations. No focal atrophy is noted. Non-tender in T spine.  Tenderness to palpation diffusely lumbosacral. No tenderness to palpation at the sciatic notch. SI joints non-tender. Trochanters non tender. MOTOR:       Hip Flex  Quads Hamstrings Ankle DF EHL Ankle PF   Right +4/5 +4/5 +4/5 +4/5 +4/5 +4/5   Left +4/5 +4/5 +4/5 N/A fusion +4/5 +4/5     Straight Leg raise negative. Presents in wheelchair. Written by Grabiel Hodges, as dictated by Aj Nicholson MD.    I, Dr. Aj Nicholson MD, confirm that all documentation is accurate. Mr. Bettye Murillo may have a reminder for a \"due or due soon\" health maintenance. I have asked that he contact his primary care provider for follow-up on this health maintenance.

## 2020-08-05 NOTE — PATIENT INSTRUCTIONS
Learning About Degenerative Disc Disease What is degenerative disc disease? Degenerative disc disease isn't really a disease. It's a term used to describe the normal changes in your spinal discs as you age. Spinal discs are small, spongy discs that separate the bones (vertebrae) that make up the spine. The discs act as shock absorbers for the spine. They let your spine flex, bend, and twist. 
Degenerative disc disease can take place in one or more places along the spine. It most often occurs in the discs in the lower back and the neck. The changes in the discs can cause back and neck pain. They can also lead to osteoarthritis, a herniated disc, or spinal stenosis. What causes it? As we age, our spinal discs break down, or degenerate. This breakdown causes the symptoms of degenerative disc disease in some people. When the discs break down, they can lose fluid and dry out, and their outer layers can have tiny cracks or tears. This leads to less padding and less space between the bones in the spine. The body reacts to this by making bony growths on the spine called bone spurs. These spurs can press on the spinal nerve roots or spinal cord. This can cause pain and can affect how well the nerves work. These changes in the discs are more likely to occur if you smoke, do heavy physical work (such as repeated heavy lifting), or are very overweight. A sudden injury may also cause changes to occur. What are the symptoms? Many people with degenerative disc disease have no pain. But others have severe pain or other symptoms that limit their activities. Some of the most common symptoms are: 
· Pain in the back or neck. Where the pain occurs depends on which discs are affected. · Pain that gets worse when you move, such as when you bend over, reach up, or twist. 
· Pain that may occur in the rear end (buttocks), arm, or leg if a nerve is pinched. · Numbness or tingling in your arm or leg. The pain may start after a major injury (such as from a car accident), a minor injury (such as a fall from a low height), or a normal motion (such as bending over to pick something up). It may also start gradually for no known reason and get worse over time. How is it diagnosed? A doctor can often diagnose degenerative disc disease while doing a physical exam. If your exam shows no signs of a serious condition, imaging tests (such as an X-ray) aren't likely to help your doctor find the cause of your symptoms. Sometimes degenerative disc disease is found when an X-ray is taken for another reason, such as an injury or other health problem. But even if the doctor finds degenerative disc disease, that doesn't always mean that you will have symptoms. How is degenerative disc disease treated? Self-care may be all you need to relieve pain caused by disc changes. This may include using ice or heat and taking over-the-counter medicines. Your doctor can prescribe stronger medicines if needed. If you develop health problems such as osteoarthritis, a herniated disc, or spinal stenosis, you may need other treatments. These include physical therapy and exercises for strengthening and stretching the back. In some cases, surgery may be recommended. It usually involves removing the damaged disc. In some cases, the bone is then permanently joined (fused) to protect the spinal cord. In rare cases, an artificial disc may be used to replace the disc that is removed. How can you care for yourself? Here are some things you can do to help manage pain from degenerative disc disease. · Use ice or heat (whichever feels better) on the affected area. ? Put ice or a cold pack on the area for 10 to 20 minutes at a time. Put a thin cloth between the ice and your skin. ? Put a warm water bottle, a heating pad set on low, or a warm cloth on your back. Put a thin cloth between the heating pad and your skin.  Do not go to sleep with a heating pad on your skin. · Ask your doctor if you can take an over-the-counter pain medicine. These include acetaminophen (such as Tylenol) and nonsteroidal anti-inflammatory drugs, such as ibuprofen or naproxen. Your doctor can prescribe stronger medicines if needed. Be safe with medicines. Read and follow all instructions on the label. · Get some exercise every day. Exercise is one of the best ways to help your back feel better and stay better. It's best to start each exercise slowly. You may notice a little soreness, and that's okay. But if an exercise makes your pain worse, stop doing it. Here are things you can try: 
? Walking. It's the simplest and maybe the best activity for your back. It gets your blood moving and helps your muscles stay strong. ? Exercises that gently stretch and strengthen your stomach, back, and leg muscles. The stronger those muscles are, the better they're able to protect your back. Follow-up care is a key part of your treatment and safety. Be sure to make and go to all appointments, and call your doctor if you are having problems. It's also a good idea to know your test results and keep a list of the medicines you take. Where can you learn more? Go to http://ema-cristian.info/ Enter A035 in the search box to learn more about \"Learning About Degenerative Disc Disease. \" Current as of: March 2, 2020               Content Version: 12.5 © 5787-5173 [employer]. Care instructions adapted under license by [employer] (which disclaims liability or warranty for this information). If you have questions about a medical condition or this instruction, always ask your healthcare professional. [address] any warranty or liability for your use of this information.

## 2020-08-05 NOTE — PROGRESS NOTES
Verbal order entered per Dr. Devang Andrea as documented on blue sheet:Baclofen 10mg take one half po BID prn pain. Disp 30 no refills. Lidoderm patch on 12 off 12. Disp 30 no refills. Toradol 30mg IM x 1 now, RT gluteus.

## 2020-08-14 ENCOUNTER — HOSPITAL ENCOUNTER (OUTPATIENT)
Dept: CT IMAGING | Age: 65
Discharge: HOME | End: 2020-08-14
Attending: INTERNAL MEDICINE
Payer: COMMERCIAL

## 2020-08-14 DIAGNOSIS — M54.16 LUMBAR RADICULOPATHY: ICD-10-CM

## 2020-08-14 DIAGNOSIS — M48.54XA NON-TRAUMATIC COMPRESSION FRACTURE OF TWELFTH THORACIC VERTEBRA, INITIAL ENCOUNTER: ICD-10-CM

## 2020-08-14 PROCEDURE — 72128 CT CHEST SPINE W/O DYE: CPT

## 2020-08-14 PROCEDURE — 72131 CT LUMBAR SPINE W/O DYE: CPT

## 2020-08-20 ENCOUNTER — OFFICE VISIT (OUTPATIENT)
Dept: ORTHOPEDIC SURGERY | Age: 65
End: 2020-08-20

## 2020-08-20 VITALS
HEART RATE: 98 BPM | RESPIRATION RATE: 15 BRPM | SYSTOLIC BLOOD PRESSURE: 153 MMHG | TEMPERATURE: 97.8 F | DIASTOLIC BLOOD PRESSURE: 101 MMHG

## 2020-08-20 DIAGNOSIS — K44.9 HIATAL HERNIA: ICD-10-CM

## 2020-08-20 DIAGNOSIS — S22.080D COMPRESSION FRACTURE OF T12 VERTEBRA WITH ROUTINE HEALING, SUBSEQUENT ENCOUNTER: Primary | ICD-10-CM

## 2020-08-20 DIAGNOSIS — M81.0 OSTEOPOROSIS, UNSPECIFIED OSTEOPOROSIS TYPE, UNSPECIFIED PATHOLOGICAL FRACTURE PRESENCE: ICD-10-CM

## 2020-08-20 DIAGNOSIS — M47.816 LUMBAR SPONDYLOSIS: ICD-10-CM

## 2020-08-20 DIAGNOSIS — R26.2 AMBULATORY DYSFUNCTION: ICD-10-CM

## 2020-08-20 RX ORDER — DULOXETIN HYDROCHLORIDE 20 MG/1
CAPSULE, DELAYED RELEASE ORAL
Qty: 30 CAP | Refills: 1 | Status: SHIPPED | OUTPATIENT
Start: 2020-08-20 | End: 2021-06-08

## 2020-08-20 RX ORDER — LYSINE HCL 500 MG
1 TABLET ORAL 2 TIMES DAILY
Qty: 60 TAB | Refills: 5 | Status: SHIPPED | OUTPATIENT
Start: 2020-08-20 | End: 2021-06-08

## 2020-08-20 NOTE — PROGRESS NOTES
[address], [address]  [place], [facility] [address]  Phone: [phone]  Fax: [phone]        Vasu Bautista  : 1955  PCP: Dotty Gross MD    PROGRESS NOTE      ASSESSMENT AND PLAN    Diagnoses and all orders for this visit:    1. Compression fracture of T12 vertebra with routine healing, subsequent encounter  -     AMB SUPPLY ORDER  -     DEXA BONE DENSITY STUDY AXIAL; Future    2. Lumbar spondylosis  -     AMB SUPPLY ORDER    3. Osteoporosis, unspecified osteoporosis type, unspecified pathological fracture presence  -     AMB SUPPLY ORDER  -     DEXA BONE DENSITY STUDY AXIAL; Future    4. Ambulatory dysfunction  -     AMB SUPPLY ORDER    5. Hiatal hernia    Other orders  -     Calcium Carbonate-Vit D3-Min 600 mg calcium- 400 unit tab; Take 1 Tab by mouth two (2) times a day. With full glass of water  -     DULoxetine (CYMBALTA) 20 mg capsule; 1 tab po in the morning with food      1. 63 y.o. male with multiple compression fractures, subacute at T12. Interestingly, he is asymptomatic at this site. His  primary pain is lumbosacral and mechanical.  No radiculopathy. No lumbar or thoracic stenosis to explain his lower extremity weakness. 2. He is osteoporotic and needs work-up. 3. He appears cachectic has had a negative work-up. He does have a large hiatal hernia which may be causing his early satiety. Follow-up with Dr. Nathalie Flanagan. .  4. Advised to continue HEP  5. DEXA  6. Trial of Calcium BID  7. Trial of Cymbalta 20 mg  8. Pt declines PT  9. Rx for folding wheelchair, patient with a high fall risk and fracture risk. Having difficulty ambulating with a walker. 10. Given information on DEXA    Follow-up and Dispositions    · Return in about 1 month (around 2020). HISTORY OF PRESENT ILLNESS  Juan Antonio Ibanez is a 63 y.o. male. Pt was last evaluated 2020 for lumbosacral DDD. Trial of Baclofen and Lidoderm patches. Toradol 30 mg. Denies benefit with the Lidoderm patches. Uncertain benefit from Toradol. Pt states that he has no pain higher up. However, he reports that he can barely walk due to his lower back pain. Describes nerves as \"out of control\" in his legs, noting a constant sensation of needing to fall. Denies currently taking calcium. Pt denies any recent GI ulcers, bleeds or renal dysfucntion. Pain Assessment  8/20/2020   Location of Pain Back   Location Modifiers -   Severity of Pain 8   Quality of Pain Aching;Dull; Other (Comment)   Quality of Pain Comment weakness   Duration of Pain -   Frequency of Pain Constant   Aggravating Factors Walking;Standing   Aggravating Factors Comment -   Limiting Behavior -   Relieving Factors Rest;Nothing   Relieving Factors Comment -   Result of Injury -   Work-Related Injury -   Type of Injury -       Does pain radiate into extremities: No  Does patient have numbness/tingling: No  Does patient have weakness: BLE  Pt denies saddle paresthesias. Medications pt is on: Flexeril 10 mg TID. Aspirin. Denies persistent fevers, chills, neurogenic bowel or bladder symptoms. Treatments patient has tried:  Physical therapy:No  Doing HEP: Unknown  Failed medications: Ibuprofen, Lidoderm patches, Toradol  Spinal injections: No     Spinal surgery- No.   Spine surgery consult: No.      XRs 6/2020: compression deformities T11-12, L3-4. DDD L5-S1  Last T CT 8/2020: acute T12 compression, severe COPD, large hiatal hernia  Last L CT 8/2020: old inury T11 and L4     reviewed. PMHx of internal bone growth stimulator for L ankle fracture (~10 sxs), GERD, OA of knee, . Worked on a [employer] for 34 years. Creatinine 0.5 2020.      PAST MEDICAL HISTORY   Past Medical History:   Diagnosis Date    Ankle fracture, left 10/19/2008    Cough     DJD (degenerative joint disease) of knee 11/10/2010    ED (erectile dysfunction) 11/10/2010    Fracture of left tibia 10/19/2008    Severe Grade IIIB  GERD (gastroesophageal reflux disease) 11/10/2010    denies    Hearing loss     Left ankle pain     Left ankle sprain     Left ankle strain     Pain with urination     Sinusitis 11/10/2010    Wears glasses        Past Surgical History:   Procedure Laterality Date    HX ANKLE FRACTURE TX  10/20/2008    HX ORTHOPAEDIC      Several surgeries in the past which required ORIF of the distal tibia posterior approach and bone grafting x2 separate surgeries    HI ANESTH,SURGERY OF SHOULDER      11/27/17   . MEDICATIONS      Current Outpatient Medications   Medication Sig Dispense Refill    Calcium Carbonate-Vit D3-Min 600 mg calcium- 400 unit tab Take 1 Tab by mouth two (2) times a day. With full glass of water 60 Tab 5    DULoxetine (CYMBALTA) 20 mg capsule 1 tab po in the morning with food 30 Cap 1    Anoro Ellipta 62.5-25 mcg/actuation inhaler inhale 1 puff by mouth once daily 1 Inhaler 5    aspirin 81 mg chewable tablet Take 1 Tab by mouth daily. 1 Tab 0        Controlled Substance Monitoring:    No flowsheet data found. ALLERGIES    Allergies   Allergen Reactions    Niacin Rash          SOCIAL HISTORY    Social History     Socioeconomic History    Marital status:      Spouse name: Not on file    Number of children: Not on file    Years of education: Not on file    Highest education level: Not on file   Occupational History    Not on file   Social Needs    Financial resource strain: Not on file    Food insecurity     Worry: Not on file     Inability: Not on file    Transportation needs     Medical: Not on file     Non-medical: Not on file   Tobacco Use    Smoking status: Current Every Day Smoker     Packs/day: 1.50     Types: Cigarettes    Smokeless tobacco: Never Used   Substance and Sexual Activity    Alcohol use:  Yes     Alcohol/week: 0.8 - 5.0 standard drinks     Types: 1 - 6 Cans of beer per week    Drug use: No    Sexual activity: Not Currently   Lifestyle    Physical activity     Days per week: Not on file     Minutes per session: Not on file    Stress: Not on file   Relationships    Social connections     Talks on phone: Not on file     Gets together: Not on file     Attends Sikh service: Not on file     Active member of club or organization: Not on file     Attends meetings of clubs or organizations: Not on file     Relationship status: Not on file    Intimate partner violence     Fear of current or ex partner: Not on file     Emotionally abused: Not on file     Physically abused: Not on file     Forced sexual activity: Not on file   Other Topics Concern    Not on file   Social History Narrative    Not on file     Socioeconomic History    Marital status:      Spouse name: Not on file    Number of children: Not on file    Years of education: Not on file    Highest education level: Not on file   Occupational History    Not on file   Social Needs    Financial resource strain: Not on file    Food insecurity     Worry: Not on file     Inability: Not on file    Transportation needs     Medical: Not on file     Non-medical: Not on file   Tobacco Use    Smoking status: Current Every Day Smoker     Packs/day: 1.50     Types: Cigarettes    Smokeless tobacco: Never Used   Substance and Sexual Activity    Alcohol use:  Yes     Alcohol/week: 0.8 - 5.0 standard drinks     Types: 1 - 6 Cans of beer per week    Drug use: No    Sexual activity: Not Currently   Lifestyle    Physical activity     Days per week: Not on file     Minutes per session: Not on file    Stress: Not on file   Relationships    Social connections     Talks on phone: Not on file     Gets together: Not on file     Attends Sikh service: Not on file     Active member of club or organization: Not on file     Attends meetings of clubs or organizations: Not on file     Relationship status: Not on file    Intimate partner violence     Fear of current or ex partner: Not on file     Emotionally abused: Not on file     Physically abused: Not on file     Forced sexual activity: Not on file   Other Topics Concern    Not on file   Social History Narrative    Not on file      Problem Relation Age of Onset    Migraines Mother     Hypertension Other     Arthritis-osteo Other        REVIEW OF SYSTEMS  Review of Systems   Constitutional: Positive for weight loss. Negative for chills and fever. Respiratory: Negative for shortness of breath. Cardiovascular: Negative for chest pain. Gastrointestinal: Negative for constipation. Negative for fecal incontinence   Genitourinary: Negative for dysuria. Negative for urinary incontinence   Musculoskeletal: Positive for back pain. Per HPI   Skin: Negative for rash. Neurological: Positive for focal weakness. Negative for dizziness, tingling, tremors and headaches. Endo/Heme/Allergies: Does not bruise/bleed easily. Psychiatric/Behavioral: The patient does not have insomnia. PHYSICAL EXAMINATION  Visit Vitals  BP (!) 153/101 (BP 1 Location: Right arm, BP Patient Position: Sitting)   Pulse 98   Temp 97.8 °F (36.6 °C) (Temporal)   Resp 15       Accompanied by spouse. Constitutional:  Cachectic, in no acute distress. Psychiatric: Affect and mood are appropriate. Integumentary: No rashes or abrasions noted on exposed areas. Cardiovascular/Peripheral Vascular: No peripheral edema is noted BLE. SPINE/MUSCULOSKELETAL EXAM    Kyphotic    Thoracolumbar spine:  No rash, ecchymosis, or gross obliquity. No fasciculations. Atrophy BLE. Non tender to percussion in lower thoracic, upper lumbar. Tenderness to palpation L5-S1 midline. No tenderness to palpation at the sciatic notch. SI joints non-tender. Trochanters non tender. MOTOR:       Hip Flex Quads Hamstrings Ankle DF EHL Ankle PF   Right 4/5 4/5 4/5 4/5 4/5 4/5   Left 4/5 4/5 4/5 N/A fusion 4/5 4/5     Straight Leg raise negative.      Presents in wheelchair. Written by Kassy Moore, as dictated by Denilson Collins MD.    I, Dr. Denilson Collins MD, confirm that all documentation is accurate. Mr. Mayra Godoy may have a reminder for a \"due or due soon\" health maintenance. I have asked that he contact his primary care provider for follow-up on this health maintenance.

## 2020-08-20 NOTE — PATIENT INSTRUCTIONS
Dual-Energy X-Ray Absorptiometry (DXA) Test: About This Test 
What is it? Dual-energy X-ray absorptiometry (DXA) is a test that uses two different X-ray beams to check bone thickness (density) in your spine and hip. This information is used to estimate the strength of your bones. Why is this test done? Bone density testing is often done for: 
· People who are at risk for osteoporosis, including: ? Women who are age 72 and older. ? Older men. ? People who take some medications, such as corticosteroids. ? People who have certain medical conditions, such as hyperparathyroidism. · Younger women who have osteoporosis and are at high risk for broken bones. · People who have osteoporosis, to see how well treatment is working. How is the test done? · You will lie on your back on a padded table. You probably can leave your clothes on, but you will remove any metal buttons or jenaro for the test. 
· You may need to lie with your legs straight or with your lower legs resting on a platform built into the table. · The machine will scan your bones and measure the amount of radiation they absorb. During this test you are exposed to a very low dose of radiation. How long does the test take? The DXA scan, which scans the hip and lower spine, takes about 20 minutes. Other kinds of bone density scans may take 30 to 45 minutes. What happens after the test? 
· You will probably be able to go home right away. · You can go back to your usual activities right away. Follow-up care is a key part of your treatment and safety. Be sure to make and go to all appointments, and call your doctor if you are having problems. It's also a good idea to keep a list of the medicines you take. Ask your doctor when you can expect to have your test results. Where can you learn more? Go to http://ema-cristian.info/ Enter I372 in the search box to learn more about \"Dual-Energy X-Ray Absorptiometry (DXA) Test: About This Test.\" Current as of: August 7, 2019               Content Version: 12.5 © 3194-1191 [employer]. Care instructions adapted under license by [employer] (which disclaims liability or warranty for this information). If you have questions about a medical condition or this instruction, always ask your healthcare professional. [address] any warranty or liability for your use of this information.

## 2020-08-20 NOTE — PROGRESS NOTES
Verbal order entered per Dr. Josue Overton as documented on blue sheet:DEXA study due to osteoporosis with current fx. Cymbalta 20mg take 1 tab po QAM with food. Disp 30 with 1 refill. DME Generic supply for folding wheelchair. Calcium 600 with 400 unit Vit D take 1 tab po BID with full glass of water. Disp 60 with 5 refills. Kelly Borja presents today for   Chief Complaint   Patient presents with    Back Pain     fu       Is someone accompanying this pt? NO    Is the patient using any DME equipment during OV? NO    Depression Screening:  3 most recent PHQ Screens 8/20/2020   PHQ Not Done -   Little interest or pleasure in doing things Not at all   Feeling down, depressed, irritable, or hopeless Not at all   Total Score PHQ 2 0       Learning Assessment:  Learning Assessment 4/30/2014   PRIMARY LEARNER Patient   HIGHEST LEVEL OF EDUCATION - PRIMARY LEARNER  2 YEARS OF COLLEGE   BARRIERS PRIMARY LEARNER NONE   CO-LEARNER CAREGIVER No   PRIMARY LANGUAGE ENGLISH   LEARNER PREFERENCE PRIMARY LISTENING   ANSWERED BY patient    RELATIONSHIP SELF         Coordination of Care:  1. Have you been to the ER, urgent care clinic since your last visit? NO  Hospitalized since your last visit? NO    2. Have you seen or consulted any other health care providers outside of the [address] [place] [address] since your last visit? NO Include any pap smears or colon screening.  NO    Last  Checked 8/20/2020

## 2021-01-01 NOTE — TELEPHONE ENCOUNTER
2000: SBAR OUT Report: BABY    Verbal report given to *** (full name and credentials) on this patient, being transferred to *** (unit) for {TRANSFER CARE:07991}. Report consisted of Situation, Background, Assessment, and Recommendations (SBAR). Ullin ID bands were compared with the identification form, and verified with the patient's mother and receiving nurse. Information from the {SBAR REPORTS VXDU:50374} and the Sissy Report was reviewed with the receiving nurse. According to the estimated gestational age scale, this infant is ***. BETA STREP:   The mother's Group Beta Strep (GBS) result was {Prenatals:74450}. She has received {NUMBERS 1-10:15563} dose(s) of {Meds; antibiotics:50319}. Last dose given on *** at ***. Prenatal care {WAS/NOT:19192::\"was\"} received by this patients mother. Opportunity for questions and clarification provided. Left message for Ms. Hagan to call the office. Also left message for patient to call the office.

## 2021-04-22 DIAGNOSIS — R06.02 SOB (SHORTNESS OF BREATH): ICD-10-CM

## 2021-04-22 RX ORDER — UMECLIDINIUM BROMIDE AND VILANTEROL TRIFENATATE 62.5; 25 UG/1; UG/1
1 POWDER RESPIRATORY (INHALATION) DAILY
Qty: 1 INHALER | Refills: 5 | Status: SHIPPED | OUTPATIENT
Start: 2021-04-22 | End: 2021-04-27

## 2021-04-22 NOTE — TELEPHONE ENCOUNTER
Last Visit: 6/30/20 with MD Hopkins  Next Appointment: none  Previous Refill Encounter(s): 7/10/20 #1 with 5 refills    Requested Prescriptions     Pending Prescriptions Disp Refills    umeclidinium-vilanteroL (Anoro Ellipta) 62.5-25 mcg/actuation inhaler 1 Inhaler 5     Sig: Take 1 Puff by inhalation daily.

## 2021-04-27 ENCOUNTER — TELEPHONE (OUTPATIENT)
Dept: INTERNAL MEDICINE CLINIC | Age: 66
End: 2021-04-27

## 2021-04-27 RX ORDER — FLUTICASONE PROPIONATE AND SALMETEROL 250; 50 UG/1; UG/1
1 POWDER RESPIRATORY (INHALATION) EVERY 12 HOURS
Qty: 1 INHALER | Refills: 3 | Status: SHIPPED | OUTPATIENT
Start: 2021-04-27 | End: 2021-11-16 | Stop reason: ALTCHOICE

## 2021-04-27 NOTE — TELEPHONE ENCOUNTER
Pt has new insurance that will not cover his Rx umeclisinium-vilanterol he is asking if you can prescribe him something elese in lieu of it .

## 2021-06-01 ENCOUNTER — TELEPHONE (OUTPATIENT)
Dept: INTERNAL MEDICINE CLINIC | Age: 66
End: 2021-06-01

## 2021-06-01 NOTE — TELEPHONE ENCOUNTER
Left message for patient to call the office. RE:  Dr. Shani Mack does not have any appt this week, patient can go to the Urgent care.

## 2021-06-01 NOTE — TELEPHONE ENCOUNTER
Patient stated he has had cough for the past 2 months, and fatigue set in about a week ago. The patient would like to be seen as soon as possible. Please advise.

## 2021-06-03 ENCOUNTER — APPOINTMENT (OUTPATIENT)
Dept: CT IMAGING | Age: 66
DRG: 871 | End: 2021-06-03
Attending: STUDENT IN AN ORGANIZED HEALTH CARE EDUCATION/TRAINING PROGRAM
Payer: COMMERCIAL

## 2021-06-03 ENCOUNTER — APPOINTMENT (OUTPATIENT)
Dept: MRI IMAGING | Age: 66
DRG: 871 | End: 2021-06-03
Attending: STUDENT IN AN ORGANIZED HEALTH CARE EDUCATION/TRAINING PROGRAM
Payer: COMMERCIAL

## 2021-06-03 ENCOUNTER — APPOINTMENT (OUTPATIENT)
Dept: GENERAL RADIOLOGY | Age: 66
DRG: 871 | End: 2021-06-03
Attending: STUDENT IN AN ORGANIZED HEALTH CARE EDUCATION/TRAINING PROGRAM
Payer: COMMERCIAL

## 2021-06-03 ENCOUNTER — HOSPITAL ENCOUNTER (INPATIENT)
Age: 66
LOS: 5 days | Discharge: HOME HEALTH | DRG: 871 | End: 2021-06-08
Attending: EMERGENCY MEDICINE | Admitting: FAMILY MEDICINE
Payer: COMMERCIAL

## 2021-06-03 DIAGNOSIS — J18.9 PNEUMONIA DUE TO INFECTIOUS ORGANISM, UNSPECIFIED LATERALITY, UNSPECIFIED PART OF LUNG: Primary | ICD-10-CM

## 2021-06-03 DIAGNOSIS — J44.9 CHRONIC OBSTRUCTIVE PULMONARY DISEASE, UNSPECIFIED COPD TYPE (HCC): ICD-10-CM

## 2021-06-03 DIAGNOSIS — R00.0 TACHYCARDIA, UNSPECIFIED: ICD-10-CM

## 2021-06-03 DIAGNOSIS — A41.9 SEPSIS, DUE TO UNSPECIFIED ORGANISM, UNSPECIFIED WHETHER ACUTE ORGAN DYSFUNCTION PRESENT (HCC): ICD-10-CM

## 2021-06-03 DIAGNOSIS — J69.0 ASPIRATION PNEUMONIA OF RIGHT UPPER LOBE, UNSPECIFIED ASPIRATION PNEUMONIA TYPE (HCC): ICD-10-CM

## 2021-06-03 LAB
25(OH)D3 SERPL-MCNC: 9.4 NG/ML (ref 30–100)
ALBUMIN SERPL-MCNC: 1.5 G/DL (ref 3.4–5)
ALBUMIN/GLOB SERPL: 0.3 {RATIO} (ref 0.8–1.7)
ALP SERPL-CCNC: 126 U/L (ref 45–117)
ALT SERPL-CCNC: 61 U/L (ref 16–61)
ANION GAP SERPL CALC-SCNC: 8 MMOL/L (ref 3–18)
AST SERPL-CCNC: 51 U/L (ref 10–38)
ATRIAL RATE: 106 BPM
BASOPHILS # BLD: 0 K/UL (ref 0–0.1)
BASOPHILS NFR BLD: 0 % (ref 0–2)
BILIRUB SERPL-MCNC: 0.4 MG/DL (ref 0.2–1)
BUN SERPL-MCNC: 7 MG/DL (ref 7–18)
BUN/CREAT SERPL: 23 (ref 12–20)
CALCIUM SERPL-MCNC: 8.4 MG/DL (ref 8.5–10.1)
CALCULATED P AXIS, ECG09: 60 DEGREES
CALCULATED R AXIS, ECG10: 86 DEGREES
CALCULATED T AXIS, ECG11: 62 DEGREES
CHLORIDE SERPL-SCNC: 94 MMOL/L (ref 100–111)
CO2 SERPL-SCNC: 24 MMOL/L (ref 21–32)
CREAT SERPL-MCNC: 0.3 MG/DL (ref 0.6–1.3)
DIAGNOSIS, 93000: NORMAL
DIFFERENTIAL METHOD BLD: ABNORMAL
EOSINOPHIL # BLD: 0.2 K/UL (ref 0–0.4)
EOSINOPHIL NFR BLD: 2 % (ref 0–5)
ERYTHROCYTE [DISTWIDTH] IN BLOOD BY AUTOMATED COUNT: 13.3 % (ref 11.6–14.5)
FOLATE SERPL-MCNC: 7.1 NG/ML (ref 3.1–17.5)
GLOBULIN SER CALC-MCNC: 5 G/DL (ref 2–4)
GLUCOSE SERPL-MCNC: 107 MG/DL (ref 74–99)
HCT VFR BLD AUTO: 27.7 % (ref 36–48)
HGB BLD-MCNC: 9.5 G/DL (ref 13–16)
LACTATE BLD-SCNC: 2.77 MMOL/L (ref 0.4–2)
LACTATE SERPL-SCNC: 1 MMOL/L (ref 0.4–2)
LYMPHOCYTES # BLD: 1.6 K/UL (ref 0.9–3.6)
LYMPHOCYTES NFR BLD: 12 % (ref 21–52)
MCH RBC QN AUTO: 31.3 PG (ref 24–34)
MCHC RBC AUTO-ENTMCNC: 34.3 G/DL (ref 31–37)
MCV RBC AUTO: 91.1 FL (ref 74–97)
MONOCYTES # BLD: 1.2 K/UL (ref 0.05–1.2)
MONOCYTES NFR BLD: 9 % (ref 3–10)
NEUTS SEG # BLD: 10.3 K/UL (ref 1.8–8)
NEUTS SEG NFR BLD: 77 % (ref 40–73)
P-R INTERVAL, ECG05: 142 MS
PLATELET # BLD AUTO: 365 K/UL (ref 135–420)
PMV BLD AUTO: 9.8 FL (ref 9.2–11.8)
POTASSIUM SERPL-SCNC: 3.6 MMOL/L (ref 3.5–5.5)
PROT SERPL-MCNC: 6.5 G/DL (ref 6.4–8.2)
Q-T INTERVAL, ECG07: 328 MS
QRS DURATION, ECG06: 82 MS
QTC CALCULATION (BEZET), ECG08: 435 MS
RBC # BLD AUTO: 3.04 M/UL (ref 4.35–5.65)
SODIUM SERPL-SCNC: 126 MMOL/L (ref 136–145)
TSH SERPL DL<=0.05 MIU/L-ACNC: 4.66 UIU/ML (ref 0.36–3.74)
VENTRICULAR RATE, ECG03: 106 BPM
VIT B12 SERPL-MCNC: 693 PG/ML (ref 211–911)
WBC # BLD AUTO: 13.4 K/UL (ref 4.6–13.2)

## 2021-06-03 PROCEDURE — 74011000250 HC RX REV CODE- 250: Performed by: NURSE PRACTITIONER

## 2021-06-03 PROCEDURE — 74011000258 HC RX REV CODE- 258: Performed by: NURSE PRACTITIONER

## 2021-06-03 PROCEDURE — 72141 MRI NECK SPINE W/O DYE: CPT

## 2021-06-03 PROCEDURE — 99285 EMERGENCY DEPT VISIT HI MDM: CPT

## 2021-06-03 PROCEDURE — 87040 BLOOD CULTURE FOR BACTERIA: CPT

## 2021-06-03 PROCEDURE — 94640 AIRWAY INHALATION TREATMENT: CPT

## 2021-06-03 PROCEDURE — 82607 VITAMIN B-12: CPT

## 2021-06-03 PROCEDURE — 74011250636 HC RX REV CODE- 250/636: Performed by: STUDENT IN AN ORGANIZED HEALTH CARE EDUCATION/TRAINING PROGRAM

## 2021-06-03 PROCEDURE — 83605 ASSAY OF LACTIC ACID: CPT

## 2021-06-03 PROCEDURE — 80053 COMPREHEN METABOLIC PANEL: CPT

## 2021-06-03 PROCEDURE — 2709999900 HC NON-CHARGEABLE SUPPLY

## 2021-06-03 PROCEDURE — 96367 TX/PROPH/DG ADDL SEQ IV INF: CPT

## 2021-06-03 PROCEDURE — 96375 TX/PRO/DX INJ NEW DRUG ADDON: CPT

## 2021-06-03 PROCEDURE — 74011250636 HC RX REV CODE- 250/636: Performed by: EMERGENCY MEDICINE

## 2021-06-03 PROCEDURE — APPSS60 APP SPLIT SHARED TIME 46-60 MINUTES: Performed by: NURSE PRACTITIONER

## 2021-06-03 PROCEDURE — 71046 X-RAY EXAM CHEST 2 VIEWS: CPT

## 2021-06-03 PROCEDURE — 74011250637 HC RX REV CODE- 250/637: Performed by: NURSE PRACTITIONER

## 2021-06-03 PROCEDURE — 36415 COLL VENOUS BLD VENIPUNCTURE: CPT

## 2021-06-03 PROCEDURE — 72146 MRI CHEST SPINE W/O DYE: CPT

## 2021-06-03 PROCEDURE — 74011250636 HC RX REV CODE- 250/636: Performed by: NURSE PRACTITIONER

## 2021-06-03 PROCEDURE — 99223 1ST HOSP IP/OBS HIGH 75: CPT | Performed by: NURSE PRACTITIONER

## 2021-06-03 PROCEDURE — 74011000636 HC RX REV CODE- 636: Performed by: EMERGENCY MEDICINE

## 2021-06-03 PROCEDURE — 0BJ08ZZ INSPECTION OF TRACHEOBRONCHIAL TREE, VIA NATURAL OR ARTIFICIAL OPENING ENDOSCOPIC: ICD-10-PCS | Performed by: INTERNAL MEDICINE

## 2021-06-03 PROCEDURE — 82306 VITAMIN D 25 HYDROXY: CPT

## 2021-06-03 PROCEDURE — 96368 THER/DIAG CONCURRENT INF: CPT

## 2021-06-03 PROCEDURE — 93005 ELECTROCARDIOGRAM TRACING: CPT

## 2021-06-03 PROCEDURE — 85025 COMPLETE CBC W/AUTO DIFF WBC: CPT

## 2021-06-03 PROCEDURE — 72100 X-RAY EXAM L-S SPINE 2/3 VWS: CPT

## 2021-06-03 PROCEDURE — 65270000029 HC RM PRIVATE

## 2021-06-03 PROCEDURE — 84443 ASSAY THYROID STIM HORMONE: CPT

## 2021-06-03 PROCEDURE — 96365 THER/PROPH/DIAG IV INF INIT: CPT

## 2021-06-03 PROCEDURE — 77030027138 HC INCENT SPIROMETER -A

## 2021-06-03 PROCEDURE — 74011000258 HC RX REV CODE- 258: Performed by: STUDENT IN AN ORGANIZED HEALTH CARE EDUCATION/TRAINING PROGRAM

## 2021-06-03 PROCEDURE — 71275 CT ANGIOGRAPHY CHEST: CPT

## 2021-06-03 PROCEDURE — 72148 MRI LUMBAR SPINE W/O DYE: CPT

## 2021-06-03 RX ORDER — ONDANSETRON 4 MG/1
4 TABLET, ORALLY DISINTEGRATING ORAL
Status: DISCONTINUED | OUTPATIENT
Start: 2021-06-03 | End: 2021-06-08 | Stop reason: HOSPADM

## 2021-06-03 RX ORDER — SODIUM CHLORIDE 0.9 % (FLUSH) 0.9 %
5-10 SYRINGE (ML) INJECTION AS NEEDED
Status: DISCONTINUED | OUTPATIENT
Start: 2021-06-03 | End: 2021-06-03

## 2021-06-03 RX ORDER — ONDANSETRON 2 MG/ML
4 INJECTION INTRAMUSCULAR; INTRAVENOUS
Status: DISCONTINUED | OUTPATIENT
Start: 2021-06-03 | End: 2021-06-08 | Stop reason: HOSPADM

## 2021-06-03 RX ORDER — SODIUM CHLORIDE 0.9 % (FLUSH) 0.9 %
5-10 SYRINGE (ML) INJECTION AS NEEDED
Status: DISCONTINUED | OUTPATIENT
Start: 2021-06-03 | End: 2021-06-08 | Stop reason: HOSPADM

## 2021-06-03 RX ORDER — BUDESONIDE AND FORMOTEROL FUMARATE DIHYDRATE 160; 4.5 UG/1; UG/1
2 AEROSOL RESPIRATORY (INHALATION)
Status: DISCONTINUED | OUTPATIENT
Start: 2021-06-03 | End: 2021-06-03 | Stop reason: CLARIF

## 2021-06-03 RX ORDER — POLYETHYLENE GLYCOL 3350 17 G/17G
17 POWDER, FOR SOLUTION ORAL DAILY PRN
Status: DISCONTINUED | OUTPATIENT
Start: 2021-06-03 | End: 2021-06-08 | Stop reason: HOSPADM

## 2021-06-03 RX ORDER — THERA TABS 400 MCG
1 TAB ORAL DAILY
Status: DISCONTINUED | OUTPATIENT
Start: 2021-06-04 | End: 2021-06-08 | Stop reason: HOSPADM

## 2021-06-03 RX ORDER — LEVOFLOXACIN 5 MG/ML
750 INJECTION, SOLUTION INTRAVENOUS EVERY 24 HOURS
Status: DISCONTINUED | OUTPATIENT
Start: 2021-06-03 | End: 2021-06-04 | Stop reason: ALTCHOICE

## 2021-06-03 RX ORDER — HEPARIN SODIUM 5000 [USP'U]/ML
5000 INJECTION, SOLUTION INTRAVENOUS; SUBCUTANEOUS EVERY 8 HOURS
Status: DISCONTINUED | OUTPATIENT
Start: 2021-06-03 | End: 2021-06-06

## 2021-06-03 RX ORDER — GUAIFENESIN 600 MG/1
600 TABLET, EXTENDED RELEASE ORAL EVERY 12 HOURS
Status: DISCONTINUED | OUTPATIENT
Start: 2021-06-03 | End: 2021-06-08 | Stop reason: HOSPADM

## 2021-06-03 RX ORDER — ACETAMINOPHEN 325 MG/1
650 TABLET ORAL
Status: DISCONTINUED | OUTPATIENT
Start: 2021-06-03 | End: 2021-06-08 | Stop reason: HOSPADM

## 2021-06-03 RX ORDER — MORPHINE SULFATE 4 MG/ML
4 INJECTION INTRAVENOUS
Status: COMPLETED | OUTPATIENT
Start: 2021-06-03 | End: 2021-06-03

## 2021-06-03 RX ORDER — ACETAMINOPHEN 650 MG/1
650 SUPPOSITORY RECTAL
Status: DISCONTINUED | OUTPATIENT
Start: 2021-06-03 | End: 2021-06-08 | Stop reason: HOSPADM

## 2021-06-03 RX ORDER — SODIUM CHLORIDE 0.9 % (FLUSH) 0.9 %
5-40 SYRINGE (ML) INJECTION EVERY 8 HOURS
Status: DISCONTINUED | OUTPATIENT
Start: 2021-06-03 | End: 2021-06-08 | Stop reason: HOSPADM

## 2021-06-03 RX ORDER — SODIUM CHLORIDE 9 MG/ML
60 INJECTION, SOLUTION INTRAVENOUS CONTINUOUS
Status: DISCONTINUED | OUTPATIENT
Start: 2021-06-03 | End: 2021-06-06

## 2021-06-03 RX ADMIN — GUAIFENESIN 600 MG: 600 TABLET, EXTENDED RELEASE ORAL at 23:18

## 2021-06-03 RX ADMIN — HEPARIN SODIUM 5000 UNITS: 5000 INJECTION INTRAVENOUS; SUBCUTANEOUS at 21:05

## 2021-06-03 RX ADMIN — PIPERACILLIN AND TAZOBACTAM 4.5 G: 4; .5 INJECTION, POWDER, FOR SOLUTION INTRAVENOUS at 21:05

## 2021-06-03 RX ADMIN — LEVOFLOXACIN 750 MG: 5 INJECTION, SOLUTION INTRAVENOUS at 13:05

## 2021-06-03 RX ADMIN — SODIUM CHLORIDE 75 ML/HR: 900 INJECTION, SOLUTION INTRAVENOUS at 21:04

## 2021-06-03 RX ADMIN — MORPHINE SULFATE 4 MG: 4 INJECTION, SOLUTION INTRAMUSCULAR; INTRAVENOUS at 18:19

## 2021-06-03 RX ADMIN — SODIUM CHLORIDE 1000 ML: 900 INJECTION, SOLUTION INTRAVENOUS at 11:43

## 2021-06-03 RX ADMIN — Medication 10 ML: at 23:19

## 2021-06-03 RX ADMIN — VANCOMYCIN HYDROCHLORIDE 750 MG: 750 INJECTION, POWDER, LYOPHILIZED, FOR SOLUTION INTRAVENOUS at 23:18

## 2021-06-03 RX ADMIN — PIPERACILLIN AND TAZOBACTAM 4.5 G: 4; .5 INJECTION, POWDER, FOR SOLUTION INTRAVENOUS at 13:05

## 2021-06-03 RX ADMIN — IOPAMIDOL 80 ML: 755 INJECTION, SOLUTION INTRAVENOUS at 13:33

## 2021-06-03 RX ADMIN — ARFORMOTEROL TARTRATE: 15 SOLUTION RESPIRATORY (INHALATION) at 21:52

## 2021-06-03 RX ADMIN — VANCOMYCIN HYDROCHLORIDE 1000 MG: 1 INJECTION, POWDER, LYOPHILIZED, FOR SOLUTION INTRAVENOUS at 12:48

## 2021-06-03 NOTE — ED TRIAGE NOTES
Patient presents to ED with c/o weakness and fatigue that has been occurring for a few days. Yesterday patient had a fall at home where he was unable to get off the ground by himself. He denies hitting his head or LOC. Patient further denies any pain.

## 2021-06-03 NOTE — ED NOTES
Assumed care of the patient from Dr. Ines Clark at [address] PM to follow the results of the MRI. The patient has what appears to be multifocal pneumonia and require admission to the hospital however has been having some lower extremity symptoms so MRI has been done consult to spine surgery has been made will follow those prior to admitting the patient. Birda Scheuermann,  5:25 PM

## 2021-06-03 NOTE — PROGRESS NOTES
Kinetic Dosing- Initial Progress Note    Pharmacy Consult ordered by Dr. Anshul Cheatham     Indication: sepsis    Patient clinical status and labs ordered/reviewed. Pt Weight Weight: 54.4 kg (120 lb)   Serum Creatinine Lab Results   Component Value Date/Time    Creatinine 0.30 (L) 06/03/2021 11:13 AM       Creatinine Clearance Estimated Creatinine Clearance: 82 mL/min (A) (by C-G formula based on SCr of 0.3 mg/dL (L)). BUN Lab Results   Component Value Date/Time    BUN 7 06/03/2021 11:13 AM       WBC Lab Results   Component Value Date/Time    WBC 13.4 (H) 06/03/2021 11:13 AM      Temperature Temp: 98 °F (36.7 °C)   HR Pulse (Heart Rate): 99     BP BP: 113/70           Kinetic Dosing Parameters:   Vd = 31L     K = .111 hr-1              t ½ = 6.2h    Drug Levels:   Vancomycin    No results for input(s): VANCP, VANCT, VANCR, VANRA in the last 72 hours. Gentamicin   No results for input(s): GENP, GENT in the last 72 hours. No lab exists for component:  GENR   Tobramycin   No results for input(s): TOBP, TOBT, TOBR in the last 72 hours. Amikacin   No results for input(s): Ángel Pane in the last 72 hours.     No lab exists for component:  Landon Osler, AMIKR, DAMIKR     Dose for naïve patient was initiated at: Vancomycin 1gm IVPB x1 followed by 750mg q8h    Continue to monitor    Sign: LISS [facility]  Date: 6/3/2021  Time: 12:48 PM

## 2021-06-03 NOTE — Clinical Note
Status[de-identified] INPATIENT [101]   Type of Bed: Medical [8]   Cardiac Monitoring Required?: No   Inpatient Hospitalization Certified Necessary for the Following Reasons: 3.  Patient receiving treatment that can only be provided in an inpatient setting (further clarification in H&P documentation)   Admitting Diagnosis: Pneumonia [499412]   Admitting Physician: Abel Dimas [[id number]]   Attending Physician: Abel Dimas [[id number]]   Estimated Length of Stay: 3-4 Midnights   Discharge Plan[de-identified] Home with Office Follow-up

## 2021-06-03 NOTE — ED PROVIDER NOTES
HPI   47-year-old cachectic male with past medical history of COPD, chronic compression fractures of T11/T12 vertebrae, everyday smoker presents with acute onset bilateral lower extremity weakness. Patient also complains of nonproductive cough for the past month, and decreased appetite and fatigue for the past year. Smokes 1.5 cigarette packs per day for the past 40 years. Denies shortness of breath, chest pain, fevers/chills, nausea vomiting, bowel or bladder symptoms. No personal history of unknown cancer or cancers in the family. Past Medical History:   Diagnosis Date    Ankle fracture, left 10/19/2008    Cough     DJD (degenerative joint disease) of knee 11/10/2010    ED (erectile dysfunction) 11/10/2010    Fracture of left tibia 10/19/2008    Severe Grade IIIB    GERD (gastroesophageal reflux disease) 11/10/2010    denies    Hearing loss     Left ankle pain     Left ankle sprain     Left ankle strain     Pain with urination     Sinusitis 11/10/2010    Wears glasses        Past Surgical History:   Procedure Laterality Date    HX ANKLE FRACTURE TX  10/20/2008    HX ORTHOPAEDIC      Several surgeries in the past which required ORIF of the distal tibia posterior approach and bone grafting x2 separate surgeries    [place] ANESTH,SURGERY OF SHOULDER      11/27/17         Family History:   Problem Relation Age of Onset    Migraines Mother     Hypertension Other     Arthritis-osteo Other        Social History     Socioeconomic History    Marital status:      Spouse name: Not on file    Number of children: Not on file    Years of education: Not on file    Highest education level: Not on file   Occupational History    Not on file   Tobacco Use    Smoking status: Current Every Day Smoker     Packs/day: 1.50     Types: Cigarettes    Smokeless tobacco: Never Used   Substance and Sexual Activity    Alcohol use:  Yes     Alcohol/week: 0.8 - 5.0 standard drinks     Types: 1 - 6 Cans of beer per week    Drug use: No    Sexual activity: Not Currently   Other Topics Concern    Not on file   Social History Narrative    Not on file     Social Determinants of Health     Financial Resource Strain:     Difficulty of Paying Living Expenses:    Food Insecurity:     Worried About Running Out of Food in the Last Year:     [address] in the Last Year:    Transportation Needs:     Lack of Transportation (Medical):  Lack of Transportation (Non-Medical):    Physical Activity:     Days of Exercise per Week:     Minutes of Exercise per Session:    Stress:     Feeling of Stress :    Social Connections:     Frequency of Communication with Friends and Family:     Frequency of Social Gatherings with Friends and Family:     Attends Congregation Services:     Active Member of Clubs or Organizations:     Attends Club or Organization Meetings:     Marital Status:    Intimate Partner Violence:     Fear of Current or Ex-Partner:     Emotionally Abused:     Physically Abused:     Sexually Abused: ALLERGIES: Niacin    Review of Systems   Constitutional: Positive for activity change, appetite change, chills, fatigue and unexpected weight change. Negative for fever. HENT: Negative for congestion, nosebleeds and sore throat. Eyes: Negative for photophobia and visual disturbance. Respiratory: Positive for cough. Negative for chest tightness, shortness of breath, wheezing and stridor. Cardiovascular: Negative for chest pain, palpitations and leg swelling. Gastrointestinal: Negative for abdominal pain, constipation, diarrhea, nausea and vomiting. Endocrine: Negative for polydipsia, polyphagia and polyuria. Genitourinary: Negative for decreased urine volume, difficulty urinating, dysuria, flank pain, hematuria and urgency. Musculoskeletal: Positive for gait problem. Negative for arthralgias, back pain, joint swelling, myalgias, neck pain and neck stiffness.    Neurological: Positive for weakness (BLE weakness). Negative for dizziness, seizures, syncope, light-headedness, numbness and headaches. Hematological: Does not bruise/bleed easily. Vitals:    06/03/21 1103   BP: 94/64   Pulse: (!) 114   Resp: 16   Temp: 98 °F (36.7 °C)   SpO2: 97%   Weight: 54.4 kg (120 lb)   Height: 6' (1.829 m)            Physical Exam  Constitutional:       Comments: Cachectic appearing   HENT:      Head: Normocephalic and atraumatic. Nose: Nose normal.      Mouth/Throat:      Mouth: Mucous membranes are moist.      Pharynx: Oropharynx is clear. Eyes:      Extraocular Movements: Extraocular movements intact. Conjunctiva/sclera: Conjunctivae normal.      Pupils: Pupils are equal, round, and reactive to light. Neck:      Vascular: No carotid bruit. Cardiovascular:      Rate and Rhythm: Regular rhythm. Tachycardia present. Pulses: Normal pulses. Heart sounds: Murmur (systolic flow murmur) heard. Pulmonary:      Effort: Pulmonary effort is normal. No respiratory distress. Breath sounds: Rales (mild rales in upper lung fields) present. No wheezing. Chest:      Chest wall: No tenderness. Abdominal:      General: Abdomen is flat. Bowel sounds are normal.      Palpations: Abdomen is soft. Musculoskeletal:         General: No tenderness. Cervical back: Normal range of motion and neck supple. No tenderness. Right lower leg: No edema. Left lower leg: No edema. Comments: 5/5 BUE strength, 1/5 BLE weakness   Lymphadenopathy:      Cervical: No cervical adenopathy. Skin:     General: Skin is warm and dry. Coloration: Skin is not jaundiced or pale. Neurological:      General: No focal deficit present. Mental Status: He is alert and oriented to person, place, and time. Mental status is at baseline.           [facility]  ED Course as of Jun 03 1759   Thu Jun 03, 2021   1344 Pulse (Heart Rate): 99 [AK]   1758 Chloride(!): 94 [AK]   1758 WBC(!): 13.4 [AK]      ED Course User Index  [AK] Eduardo Baron MD     71-year-old cachectic looking male everyday smoker with COPD, T11/T12 chronic compression fractures presents with acute bilateral lower extremity weakness and chronic cough and fatigue. No personal history of cancers. He does not complain of back pain. Vital signs notable for pressure of 90s over 60s, tachycardic initially to 107, baseline 80s heart rate. Afebrile. Physical exam notable for cachectic looking male, aao x3. Lungs CTAB. Heart rate tachycardic, no murmurs rubs or gallops. Abdomen soft, non-tender, nondistended. BUE 5/5 strength. BLE 1/5. No numbness, tingling, paresthesias in all 4 extremities. No back midline tenderness. Concern for worsening herniated disc/compression fracture, lung cancer metastasis to the lumbar spine, generalized weakness from dehydration and malnourishment, PNA. CBC notable for WBC 13.4 with 77% neutrophil predominance (baseline 4.3, 1-year ago)  BMP chronic hyponatremia, today 126 (127, 1 year ago), glucose 107  POC Lactate 2.77  Bcx x2 - pending  UA - pending  Rapid Covid (at request of Dr. Cassandra Kemp)  pending, screening, low suspicion for COVID PNA    EKG -sinus tachycardia, heart rate 106, no acute ischemic changes. CXR- Interval development of extensive superior right upper lobe consolidative  changes and possible cavitation, differential of pneumonia including TB versus  neoplastic process or both. X-ray lumbar spine - Osteopenia. Chronic compression fracture deformities at T12, L3 and L4. No acute displaced fracture. If high clinical suspicion for acute fracture recommend further evaluation with CT or MR. Chest x-ray concerning for right upper lobe lung mass with tracheal deviation, CTA chest ordered, will consider MRI L-spine. CTA Chest - Negative for PE. Consolidation in the right upper lobe with multiple smaller pockets. At least one of the pockets appear to be fairly prominent.  Developing aggressive infectious process is suspected. Enlarged mediastinal nodes. Chronic anterior wedging deformities at T3, T11 and T12. MRI C,T,L-spine- read pending. No obvious masses, processes on my read. Patient given broad-spectrum Vanco, Zosyn, Levaquin, 1 L bolus normal saline. Was placed on sepsis protocol. 1802: Spoke to Dr. Garth Keane, who will admit patient for PNA, lung CA work-up. Aware that Dr. Danelle Salgado, thoracic spine surgeon is following.     Grey Pedro MD/MPH  Emergency Medicine PGY-1  06/03/21 11:54 AM

## 2021-06-03 NOTE — ED NOTES
Sinus tach at 106 normal axis normal intervals there is no ST elevation or depression and no hypertrophy

## 2021-06-03 NOTE — H&P
History & Physical    Patient: Leif Espinosa MRN: [id number]  CSN: [id number]    YOB: 1955  Age: 61 y.o. Sex: male      DOA: 6/3/2021    Chief Complaint:   Chief Complaint   Patient presents with    Fatigue          HPI:     Leif Espinosa is a 61 y.o.  male with hx of DJD, COPD, chronic compression fractures of T11/T12 vertebrae, current tobacco abuse who presented to the ED with acute onset BLE weakness. Pt also c/o non-productive cough for the past month, decreased appetite and fatigue for the past year. He smokes 1.5 pack of cigarettes per day for the past 40 years. No shortness of breath, chest pain, fever/chills, N/V, bowel/bladder incontinence. Wife reports a significant weight loss over the past 2 years, she thought it was due to his drinking. Patient states he drinks several beers daily. In the ED, pt noted with WBC 13.4, Hgb 9.5, Hct 27.7, sodium 126, potassium 3.6, glucose 107, BUN 7, creatinine 0.30, albumin 1.5, ALT 61, AST 51. L spine xray osteopenia, chronic compression fracture deformities at T12, L3 and L4. CXR interval development of extensive superior right upper lobe consolidative changes with possible cavitation-differential of pneumonia including TB versus neoplastic process or both. CTA chest negative for PE, consolidation and right upper lobe with multiple smaller pockets, developing aggressive infectious process suspected, enlarged mediastinal nodes, chronic anterior wedging deformities at T3, T11 and T12. Sepsis protocol initiated. Blood cultures obtained x 2. Patient started on IV vancomycin, Zosyn, Levaquin and given 1 L bolus normal saline. Dr. Jacoby Harp consulted by the ED.     Past Medical History:   Diagnosis Date    Ankle fracture, left 10/19/2008    Cough     DJD (degenerative joint disease) of knee 11/10/2010    ED (erectile dysfunction) 11/10/2010    Fracture of left tibia 10/19/2008    Severe Grade IIIB    GERD (gastroesophageal reflux disease) 11/10/2010    denies    Hearing loss     Left ankle pain     Left ankle sprain     Left ankle strain     Pain with urination     Sinusitis 11/10/2010    Wears glasses        Past Surgical History:   Procedure Laterality Date    HX ANKLE FRACTURE TX  10/20/2008    HX ORTHOPAEDIC      Several surgeries in the past which required ORIF of the distal tibia posterior approach and bone grafting x2 separate surgeries    [place] ANESTH,SURGERY OF SHOULDER      11/27/17       Family History   Problem Relation Age of Onset    Migraines Mother     Hypertension Other     Arthritis-osteo Other        Social History     Socioeconomic History    Marital status:      Spouse name: Not on file    Number of children: Not on file    Years of education: Not on file    Highest education level: Not on file   Tobacco Use    Smoking status: Current Every Day Smoker     Packs/day: 1.50     Types: Cigarettes    Smokeless tobacco: Never Used   Substance and Sexual Activity    Alcohol use: Yes     Alcohol/week: 0.8 - 5.0 standard drinks     Types: 1 - 6 Cans of beer per week    Drug use: No    Sexual activity: Not Currently     Social Determinants of Health     Financial Resource Strain:     Difficulty of Paying Living Expenses:    Food Insecurity:     Worried About Running Out of Food in the Last Year:     Ran Out of Food in the Last Year:    Transportation Needs:     Lack of Transportation (Medical):      Lack of Transportation (Non-Medical):    Physical Activity:     Days of Exercise per Week:     Minutes of Exercise per Session:    Stress:     Feeling of Stress :    Social Connections:     Frequency of Communication with Friends and Family:     Frequency of Social Gatherings with Friends and Family:     Attends Jehovah's witness Services:     Active Member of Clubs or Organizations:     Attends Club or Organization Meetings:     Marital Status:        Prior to Admission medications    Medication Sig Start Date End Date Taking? Authorizing Provider   fluticasone propion-salmeteroL (ADVAIR/WIXELA) 250-50 mcg/dose diskus inhaler Take 1 Puff by inhalation every twelve (12) hours. 21   Hermelinda Nunez MD   Calcium Carbonate-Vit D3-Min 600 mg calcium- 400 unit tab Take 1 Tab by mouth two (2) times a day. With full glass of water 20   Luther Campos MD   DULoxetine (CYMBALTA) 20 mg capsule 1 tab po in the morning with food 20   Luther Campos MD   aspirin 81 mg chewable tablet Take 1 Tab by mouth daily. 19   EMILIANO Zimmerman       Allergies   Allergen Reactions    Niacin Rash         Review of Systems-positive in bold  GENERAL: +weight loss over 2 years, malaise. No fever, chills, malaise  HEENT: No change in vision, no earache, tinnitus, sore throat or sinus congestion. NECK: No pain or stiffness. PULMONARY: +cough x 1 month, occasional shortness of breath. No wheeze. Cardiovascular: no pnd or orthopnea, no CP  GASTROINTESTINAL: No abdominal pain, nausea, vomiting or diarrhea, melena or bright red blood per rectum. GENITOURINARY: No urinary frequency, urgency, hesitancy or dysuria. MUSCULOSKELETAL: No joint or muscle pain, no back pain, no recent trauma. DERMATOLOGIC: No rash, no itching, no lesions. ENDOCRINE: No polyuria, polydipsia, no heat or cold intolerance. No recent change in weight. HEMATOLOGICAL: No anemia or easy bruising or bleeding. NEUROLOGIC: +numbness/weakness BLE. No headache, seizures, tingling . Physical Exam:     Physical Exam:  Visit Vitals  /69 (BP 1 Location: Right upper arm)   Pulse 82   Temp 98 °F (36.7 °C)   Resp 18   Ht 6' (1.829 m)   Wt 54.4 kg (120 lb)   SpO2 99%   BMI 16.27 kg/m²      O2 Device: None (Room air)    Temp (24hrs), Av °F (36.7 °C), Min:98 °F (36.7 °C), Max:98 °F (36.7 °C)    No intake/output data recorded. No intake/output data recorded.     General:  Alert, cooperative, no distress, thin, cachectic appearing, poor dentition              Head: Normocephalic, without obvious abnormality, atraumatic. Eyes:  Conjunctivae/corneas clear. PERRL, EOMs intact. Nose: Nares normal. No drainage or sinus tenderness. Neck: Supple, symmetrical, trachea midline, no adenopathy, thyroid: no enlargement, no carotid bruit and no JVD. Lungs:   Coarse breath sounds bilaterally   Heart:  Regular rate and rhythm, S1, S2 normal.     Abdomen: Soft, non-tender. Bowel sounds normal.    Extremities: Extremities normal, atraumatic, no cyanosis or edema. Pulses: 2+ and symmetric all extremities. Skin:  No rashes or lesions   Neurologic: AAOx3, No focal motor or sensory deficit. Labs Reviewed: All lab results for the last 24 hours reviewed. Recent Results (from the past 24 hour(s))   CBC WITH AUTOMATED DIFF    Collection Time: 06/03/21 11:13 AM   Result Value Ref Range    WBC 13.4 (H) 4.6 - 13.2 K/uL    RBC 3.04 (L) 4.35 - 5.65 M/uL    HGB 9.5 (L) 13.0 - 16.0 g/dL    HCT 27.7 (L) 36.0 - 48.0 %    MCV 91.1 74.0 - 97.0 FL    MCH 31.3 24.0 - 34.0 PG    MCHC 34.3 31.0 - 37.0 g/dL    RDW 13.3 11.6 - 14.5 %    PLATELET 084 341 - 355 K/uL    MPV 9.8 9.2 - 11.8 FL    NEUTROPHILS 77 (H) 40 - 73 %    LYMPHOCYTES 12 (L) 21 - 52 %    MONOCYTES 9 3 - 10 %    EOSINOPHILS 2 0 - 5 %    BASOPHILS 0 0 - 2 %    ABS. NEUTROPHILS 10.3 (H) 1.8 - 8.0 K/UL    ABS. LYMPHOCYTES 1.6 0.9 - 3.6 K/UL    ABS. MONOCYTES 1.2 0.05 - 1.2 K/UL    ABS. EOSINOPHILS 0.2 0.0 - 0.4 K/UL    ABS.  BASOPHILS 0.0 0.0 - 0.1 K/UL    DF AUTOMATED     METABOLIC PANEL, COMPREHENSIVE    Collection Time: 06/03/21 11:13 AM   Result Value Ref Range    Sodium 126 (L) 136 - 145 mmol/L    Potassium 3.6 3.5 - 5.5 mmol/L    Chloride 94 (L) 100 - 111 mmol/L    CO2 24 21 - 32 mmol/L    Anion gap 8 3.0 - 18 mmol/L    Glucose 107 (H) 74 - 99 mg/dL    BUN 7 7.0 - 18 MG/DL    Creatinine 0.30 (L) 0.6 - 1.3 MG/DL    BUN/Creatinine ratio 23 (H) 12 - 20      GFR est AA >60 >60 ml/min/1.73m2    GFR est non-AA >60 >60 ml/min/1.73m2    Calcium 8.4 (L) 8.5 - 10.1 MG/DL    Bilirubin, total 0.4 0.2 - 1.0 MG/DL    ALT (SGPT) 61 16 - 61 U/L    AST (SGOT) 51 (H) 10 - 38 U/L    Alk. phosphatase 126 (H) 45 - 117 U/L    Protein, total 6.5 6.4 - 8.2 g/dL    Albumin 1.5 (L) 3.4 - 5.0 g/dL    Globulin 5.0 (H) 2.0 - 4.0 g/dL    A-G Ratio 0.3 (L) 0.8 - 1.7     EKG, 12 LEAD, INITIAL    Collection Time: 06/03/21 11:20 AM   Result Value Ref Range    Ventricular Rate 106 BPM    Atrial Rate 106 BPM    P-R Interval 142 ms    QRS Duration 82 ms    Q-T Interval 328 ms    QTC Calculation (Bezet) 435 ms    Calculated P Axis 60 degrees    Calculated R Axis 86 degrees    Calculated T Axis 62 degrees    Diagnosis         Sinus tachycardia  Otherwise normal ECG  When compared with ECG of 03-JUN-2021 11:18,  No significant change was found  Confirmed by Keyonna Diane ([id number]) on 6/3/2021 12:05:53 PM  Also confirmed by Keyonna Diane ([id number]),  Shira Moore ([id number])  on   6/3/2021 12:28:18 PM     POC LACTIC ACID    Collection Time: 06/03/21 12:59 PM   Result Value Ref Range    Lactic Acid (POC) 2.77 (HH) 0.40 - 2.00 mmol/L        XR Results (most recent):  Results from Hospital Encounter encounter on 06/03/21    XR SPINE LUMB 2 OR 3 V    Narrative  EXAM: XR SPINE LUMB 2 OR 3 V    INDICATION: BLE weakness    COMPARISON: 6/30/2020. FINDINGS: AP, lateral and spot lateral views of the lumbar spine. Limited evaluation due to osteopenia and overlying bowel gas. There is superior  endplate anterior wedging with mild compression deformity of L3 and L4 vertebral  bodies similar to comparison. Also chronic compression fracture deformity of T12  vertebral body. Remainder of the vertebral body heights are maintained. There is  5 mm retrolisthesis L5 on S1. Disc heights are maintained. Or to iliac  atherosclerosis. Impression  Osteopenia. Chronic compression fracture deformities at T12, L3 and L4.  No acute  displaced fracture. If high clinical suspicion for acute fracture recommend  further evaluation with CT or MR.        CT Results  (Last 48 hours)               06/03/21 1333  CTA CHEST W OR W WO CONT Final result    Impression:          1. No convincing evidence of pulmonary embolism. 2.  Consolidation in the right upper lobe with multiple smaller pockets. At   least one of the pockets appear to be fairly prominent. Developing aggressive   infectious process is suspected. 3.  Enlarged mediastinal nodes. 4.  Chronic anterior wedging deformities at T3, T11 and T12. Narrative:  EXAM:  CTA Chest with Contrast (Study for PE). CLINICAL INDICATION:  Shortness of breath. COMPARISON:  CT 08/14/20, chest x-ray 06/30/20. TECHNIQUE:         - Helical volumetric sections of the chest are obtained with CT pulmonary   angiogram protocol. Subsequently, sagittal and coronal multiplanar   reconstruction images are obtained. Maximum intensity projection images are   generated to better delineate the pulmonary vasculature, differentiate between   the pulmonary arteries and veins and to increase sensitivity to pulmonary   emboli.     - IV contrast dose of 100 mL Isovue-370.   - Radiation dose optimization techniques are utilized as appropriate to the   exam, with combination of automated exposure control, adjustment of the mA   and/or kV according to patient's size (Including appropriate matching for   site-specific examinations), or use of iterative reconstruction technique. FINDINGS:        Pulmonary Arteries:  No convincing evidence of pulmonary embolism is detected   bilaterally. Lung, Pleura, Airways:         - Right upper lobe consolidation with interspersed air pockets. The largest air   pocket and the right upper lobe measures up to about 4.6 x 2.9 cm (axial #16). - Right lower lobe patchy airspace opacities are noted in the posterior medial   aspect.    - Emphysematous changes are noted bilaterally. - Atelectatic changes related to large hiatal hernia. - Multiple bullae/ blebs. Mediastinum:  Enlarged subcarinal node measuring about 1.7 x 1.3 cm (axial   #118). Right hilar node measuring about 1.2 x 0.9 cm. Precarinal node   measuring about 2.0 x 1.7 cm (axial #107). Aorta:  No evidence of aortic dissection or aneurysm. Base of Neck:  No acute findings. Chest Wall, Axillae:  Unremarkable. Upper Abdomen:  No acute abnormalities. Skeletal Structures:  Anterior wedging deformity at T3, T11 and T12. Similar to   08/14/20. Procedures/imaging: see electronic medical records for all procedures/Xrays and details which were not copied into this note but were reviewed prior to creation of Plan      Assessment/Plan        Assessment  1. Sepsis POA w/ tachycardia, lactic acidosis, leukocytosis  2. Pneumonia? RUL  3. Abnormal CXR and CTa chest - right superior lung mass? 4. BLE weakness, acute onset  5. Hyponatremia  6. Hypoalbuminemia  7. Chronic compression fracture deformities T12, L3 and L4  8. Moderate protein calorie malnutrition  9. COPD  10. Osteopenia  11. Weight loss  12. Tobacco abuse, ETOH use          Plan  1. Dr. Emanuel Valladares consulted and following, appreciate assistance. MRI cervical, lumbar and thoracic spine pending. 2. Consult pulmonary in am pertaining to possible right lung mass?, CXR and CTa chest findings  3. Continue IV Levaquin, Vancomycin and Zosyn. Follow blood cultures. Trend lactic acid. Please obtain UA and urine culture. Please obtain rapid Covid testing (low suspicion). Mucinex BID. Incentive spirometry. 4. Start on IVFs. Check random urine sodium and urine osmolality  5. Check Vitamin B12, folate, Vitamin D  6. Start on [place] daily. Consult nutrition. 7. Monitor vital signs, weight per unit protocol  8. PT, OT eval and treat  9.  Fall, aspiration precautions  10. Consult CM to assist w/ dc planning          Diet: Regular  DVT/GI Prophylaxis: Hep SQ  Code Status: FULL    Contact: wife Oliverio Cole [phone] - wife at bedside at the time of my evaluation. Discussed with patient and wife at bedside about hospital admission and plan care. They understand and agrees. All questions answered. Disclaimer: Sections of this note are dictated using utilizing voice recognition software. Minor typographical errors may be present. If questions arise, please do not hesitate to contact me or call our department.         Marvin Ch NP-C  [facility]  pager [phone]

## 2021-06-03 NOTE — PROGRESS NOTES
Bedside shift change report given to Anaya Appiah RN (oncoming nurse) by Alfredo Davis RN (offgoing nurse). Report included the following information SBAR, Kardex, Intake/Output, MAR and Recent Results.

## 2021-06-04 LAB
ANION GAP SERPL CALC-SCNC: 7 MMOL/L (ref 3–18)
APPEARANCE UR: CLEAR
BASOPHILS # BLD: 0 K/UL (ref 0–0.1)
BASOPHILS NFR BLD: 0 % (ref 0–2)
BILIRUB UR QL: NEGATIVE
BUN SERPL-MCNC: 7 MG/DL (ref 7–18)
BUN/CREAT SERPL: 35 (ref 12–20)
CALCIUM SERPL-MCNC: 7.9 MG/DL (ref 8.5–10.1)
CHLORIDE SERPL-SCNC: 100 MMOL/L (ref 100–111)
CO2 SERPL-SCNC: 24 MMOL/L (ref 21–32)
COLOR UR: YELLOW
COVID-19 RAPID TEST, COVR: NOT DETECTED
CREAT SERPL-MCNC: 0.2 MG/DL (ref 0.6–1.3)
DIFFERENTIAL METHOD BLD: ABNORMAL
EOSINOPHIL # BLD: 0.3 K/UL (ref 0–0.4)
EOSINOPHIL NFR BLD: 3 % (ref 0–5)
ERYTHROCYTE [DISTWIDTH] IN BLOOD BY AUTOMATED COUNT: 13.4 % (ref 11.6–14.5)
GLUCOSE BLD STRIP.AUTO-MCNC: 87 MG/DL (ref 70–110)
GLUCOSE SERPL-MCNC: 73 MG/DL (ref 74–99)
GLUCOSE UR STRIP.AUTO-MCNC: NEGATIVE MG/DL
HCT VFR BLD AUTO: 26.3 % (ref 36–48)
HGB BLD-MCNC: 9 G/DL (ref 13–16)
HGB UR QL STRIP: NEGATIVE
KETONES UR QL STRIP.AUTO: NEGATIVE MG/DL
L PNEUMO AG UR QL IA: NEGATIVE
LEUKOCYTE ESTERASE UR QL STRIP.AUTO: NEGATIVE
LYMPHOCYTES # BLD: 1 K/UL (ref 0.9–3.6)
LYMPHOCYTES NFR BLD: 11 % (ref 21–52)
MCH RBC QN AUTO: 31.5 PG (ref 24–34)
MCHC RBC AUTO-ENTMCNC: 34.2 G/DL (ref 31–37)
MCV RBC AUTO: 92 FL (ref 74–97)
MONOCYTES # BLD: 0.7 K/UL (ref 0.05–1.2)
MONOCYTES NFR BLD: 8 % (ref 3–10)
MYELOCYTES NFR BLD MANUAL: 1 %
NEUTS BAND NFR BLD MANUAL: 2 % (ref 0–5)
NEUTS SEG # BLD: 6.9 K/UL (ref 1.8–8)
NEUTS SEG NFR BLD: 75 % (ref 40–73)
NITRITE UR QL STRIP.AUTO: NEGATIVE
PH UR STRIP: 6.5 [PH] (ref 5–8)
PLATELET # BLD AUTO: 343 K/UL (ref 135–420)
PLATELET COMMENTS,PCOM: ABNORMAL
PMV BLD AUTO: 10 FL (ref 9.2–11.8)
POTASSIUM SERPL-SCNC: 3.6 MMOL/L (ref 3.5–5.5)
PROT UR STRIP-MCNC: NEGATIVE MG/DL
RBC # BLD AUTO: 2.86 M/UL (ref 4.35–5.65)
RBC MORPH BLD: ABNORMAL
S PNEUM AG UR QL: NEGATIVE
SODIUM SERPL-SCNC: 131 MMOL/L (ref 136–145)
SODIUM UR-SCNC: 33 MMOL/L (ref 20–110)
SOURCE, COVRS: NORMAL
SP GR UR REFRACTOMETRY: >1.03 (ref 1–1.03)
UROBILINOGEN UR QL STRIP.AUTO: 1 EU/DL (ref 0.2–1)
WBC # BLD AUTO: 8.9 K/UL (ref 4.6–13.2)

## 2021-06-04 PROCEDURE — 74011250636 HC RX REV CODE- 250/636: Performed by: EMERGENCY MEDICINE

## 2021-06-04 PROCEDURE — 74011250636 HC RX REV CODE- 250/636: Performed by: NURSE PRACTITIONER

## 2021-06-04 PROCEDURE — 81003 URINALYSIS AUTO W/O SCOPE: CPT

## 2021-06-04 PROCEDURE — 99222 1ST HOSP IP/OBS MODERATE 55: CPT | Performed by: INTERNAL MEDICINE

## 2021-06-04 PROCEDURE — 84300 ASSAY OF URINE SODIUM: CPT

## 2021-06-04 PROCEDURE — 99232 SBSQ HOSP IP/OBS MODERATE 35: CPT | Performed by: EMERGENCY MEDICINE

## 2021-06-04 PROCEDURE — 65270000029 HC RM PRIVATE

## 2021-06-04 PROCEDURE — 87086 URINE CULTURE/COLONY COUNT: CPT

## 2021-06-04 PROCEDURE — 74011250637 HC RX REV CODE- 250/637: Performed by: NURSE PRACTITIONER

## 2021-06-04 PROCEDURE — 97530 THERAPEUTIC ACTIVITIES: CPT

## 2021-06-04 PROCEDURE — 97166 OT EVAL MOD COMPLEX 45 MIN: CPT

## 2021-06-04 PROCEDURE — 97535 SELF CARE MNGMENT TRAINING: CPT

## 2021-06-04 PROCEDURE — 83935 ASSAY OF URINE OSMOLALITY: CPT

## 2021-06-04 PROCEDURE — 87635 SARS-COV-2 COVID-19 AMP PRB: CPT

## 2021-06-04 PROCEDURE — 97162 PT EVAL MOD COMPLEX 30 MIN: CPT

## 2021-06-04 PROCEDURE — 80048 BASIC METABOLIC PNL TOTAL CA: CPT

## 2021-06-04 PROCEDURE — 86480 TB TEST CELL IMMUN MEASURE: CPT

## 2021-06-04 PROCEDURE — 82962 GLUCOSE BLOOD TEST: CPT

## 2021-06-04 PROCEDURE — 74011000250 HC RX REV CODE- 250: Performed by: NURSE PRACTITIONER

## 2021-06-04 PROCEDURE — 74011000258 HC RX REV CODE- 258: Performed by: NURSE PRACTITIONER

## 2021-06-04 PROCEDURE — 87070 CULTURE OTHR SPECIMN AEROBIC: CPT

## 2021-06-04 PROCEDURE — 85025 COMPLETE CBC W/AUTO DIFF WBC: CPT

## 2021-06-04 PROCEDURE — 87116 MYCOBACTERIA CULTURE: CPT

## 2021-06-04 PROCEDURE — 87449 NOS EACH ORGANISM AG IA: CPT

## 2021-06-04 PROCEDURE — 36415 COLL VENOUS BLD VENIPUNCTURE: CPT

## 2021-06-04 PROCEDURE — 94640 AIRWAY INHALATION TREATMENT: CPT

## 2021-06-04 RX ORDER — IBUPROFEN 200 MG
1 TABLET ORAL DAILY
Status: DISCONTINUED | OUTPATIENT
Start: 2021-06-05 | End: 2021-06-08 | Stop reason: HOSPADM

## 2021-06-04 RX ORDER — LANOLIN ALCOHOL/MO/W.PET/CERES
100 CREAM (GRAM) TOPICAL DAILY
Status: DISCONTINUED | OUTPATIENT
Start: 2021-06-05 | End: 2021-06-08 | Stop reason: HOSPADM

## 2021-06-04 RX ADMIN — SODIUM CHLORIDE 75 ML/HR: 900 INJECTION, SOLUTION INTRAVENOUS at 12:25

## 2021-06-04 RX ADMIN — THERA TABS 1 TABLET: TAB at 09:00

## 2021-06-04 RX ADMIN — Medication 10 ML: at 06:53

## 2021-06-04 RX ADMIN — Medication 10 ML: at 22:14

## 2021-06-04 RX ADMIN — PIPERACILLIN AND TAZOBACTAM 4.5 G: 4; .5 INJECTION, POWDER, FOR SOLUTION INTRAVENOUS at 08:40

## 2021-06-04 RX ADMIN — VANCOMYCIN HYDROCHLORIDE 750 MG: 750 INJECTION, POWDER, LYOPHILIZED, FOR SOLUTION INTRAVENOUS at 06:24

## 2021-06-04 RX ADMIN — GUAIFENESIN 600 MG: 600 TABLET, EXTENDED RELEASE ORAL at 22:14

## 2021-06-04 RX ADMIN — Medication 10 ML: at 14:14

## 2021-06-04 RX ADMIN — PIPERACILLIN AND TAZOBACTAM 4.5 G: 4; .5 INJECTION, POWDER, FOR SOLUTION INTRAVENOUS at 14:13

## 2021-06-04 RX ADMIN — HEPARIN SODIUM 5000 UNITS: 5000 INJECTION INTRAVENOUS; SUBCUTANEOUS at 22:03

## 2021-06-04 RX ADMIN — GUAIFENESIN 600 MG: 600 TABLET, EXTENDED RELEASE ORAL at 09:00

## 2021-06-04 RX ADMIN — HEPARIN SODIUM 5000 UNITS: 5000 INJECTION INTRAVENOUS; SUBCUTANEOUS at 04:12

## 2021-06-04 RX ADMIN — ARFORMOTEROL TARTRATE: 15 SOLUTION RESPIRATORY (INHALATION) at 07:32

## 2021-06-04 RX ADMIN — PIPERACILLIN AND TAZOBACTAM 4.5 G: 4; .5 INJECTION, POWDER, FOR SOLUTION INTRAVENOUS at 22:03

## 2021-06-04 RX ADMIN — LEVOFLOXACIN 750 MG: 5 INJECTION, SOLUTION INTRAVENOUS at 12:23

## 2021-06-04 RX ADMIN — SODIUM CHLORIDE 60 ML/HR: 900 INJECTION, SOLUTION INTRAVENOUS at 22:04

## 2021-06-04 RX ADMIN — PIPERACILLIN AND TAZOBACTAM 4.5 G: 4; .5 INJECTION, POWDER, FOR SOLUTION INTRAVENOUS at 04:19

## 2021-06-04 RX ADMIN — HEPARIN SODIUM 5000 UNITS: 5000 INJECTION INTRAVENOUS; SUBCUTANEOUS at 12:24

## 2021-06-04 RX ADMIN — ARFORMOTEROL TARTRATE: 15 SOLUTION RESPIRATORY (INHALATION) at 20:22

## 2021-06-04 NOTE — PROGRESS NOTES
Occupational Therapy Goals  Initiated 6/4/2021 within 7 day(s). 1.  Patient will perform functional activity standing for 4-7 minutes with supervision/set-up, F+ balance. 2.  Patient will perform lower body dressing with supervision/set-up using AE prn.  3.  Patient will perform toilet transfers with supervision/set-up. 4.  Patient will perform all aspects of toileting with supervision/set-up. 5.  Patient will participate in upper extremity therapeutic exercise/activities with modified independence for 8 minutes. 6.  Patient will utilize energy conservation techniques during functional activities with verbal cues. Prior Level of Function:Patient was independent with self-care, exception to bathing and used a cane/RW for functional mobility PTA for community distances. Problem: Self Care Deficits Care Plan (Adult)  Goal: *Acute Goals and Plan of Care (Insert Text)  Outcome: Progressing Towards Goal       OCCUPATIONAL THERAPY EVALUATION    Patient: [place] Otero (32 y.o. male)  Date: 6/4/2021  Primary Diagnosis: Pneumonia [J18.9]  Precautions: Fall, Skin (spinal protective; compression deforminties T11/T12)      ASSESSMENT :  Patient cleared to participate in OT evaluation by RN. Upon entering the room, the patient was supine in bed, alert, and agreeable to participate in OT evaluation. Patient educated on the role of OT, evaluation process, and safety during this admission with patient verbalizing understanding. Patient performed bed mobility with stand by assist additional time and able to sit EOB with G balance for objective assessment. Patient  with good AROM of BUE for upper body ADLs this session and stand by assist for grooming and upper body dressing. Patient declining standing this session as he feels he needs 2 people; OT to recommend 2 person assist next session for pt's anxiety until functional transfer complete safely.  Patient left seated in bed, all needs met and call bell in reach. Based on the objective data described below, the patient presents with decreased strength, decreased independence with LB ADLs, decreased functional standing balance per pt's report, and decreased functional mobility, which impedes pt performance in basic self-care/ADL tasks. Patient would benefit from skilled OT to restore PLOF and maximize function. Patient will benefit from skilled intervention to address the above impairments. Patient's rehabilitation potential is considered to be Fair  Factors which may influence rehabilitation potential include:   []             None noted  [x]             Mental ability/status  [x]             Medical condition  [x]             Home/family situation and support systems  [x]             Safety awareness  []             Pain tolerance/management  []             Other:      PLAN :  Recommendations and Planned Interventions:   [x]               Self Care Training                  [x]      Therapeutic Activities  [x]               Functional Mobility Training   []      Cognitive Retraining  [x]               Therapeutic Exercises           [x]      Endurance Activities  [x]               Balance Training                    [x]      Neuromuscular Re-Education  []               Visual/Perceptual Training     [x]      Home Safety Training  [x]               Patient Education                   [x]      Family Training/Education  []               Other (comment):    Frequency/Duration: Patient will be followed by occupational therapy 1-2 times per day/4-7 days per week to address goals.   Discharge Recommendations: [address] with 24/7 Assistance pending patients progress  Further Equipment Recommendations for Discharge: bedside commode, transfer bench, and rolling walker     SUBJECTIVE:   Patient stated my legs are not dependable, they are untrustworthy    OBJECTIVE DATA SUMMARY:     Past Medical History:   Diagnosis Date    Ankle fracture, left 10/19/2008    Cough     DJD (degenerative joint disease) of knee 11/10/2010    ED (erectile dysfunction) 11/10/2010    Fracture of left tibia 10/19/2008    Severe Grade IIIB    GERD (gastroesophageal reflux disease) 11/10/2010    denies    Hearing loss     Left ankle pain     Left ankle sprain     Left ankle strain     Pain with urination     Sinusitis 11/10/2010    Wears glasses      Past Surgical History:   Procedure Laterality Date    HX ANKLE FRACTURE TX  10/20/2008    HX ORTHOPAEDIC      Several surgeries in the past which required ORIF of the distal tibia posterior approach and bone grafting x2 separate surgeries    [place] ANESTH,SURGERY OF SHOULDER      11/27/17     Barriers to Learning/Limitations: None  Compensate with: visual, verbal, tactile, kinesthetic cues/model    Home Situation:   Home Situation  Home Environment: Private residence  # Steps to Enter: 5  Rails to Enter: Yes  Hand Rails : Bilateral (Cannot use simultaneously)  One/Two Story Residence: Two story  # of Interior Steps: 12  Interior Rails: Left (During descent)  Lift Chair Available: No  Living Alone: No  Support Systems: Spouse/Significant Other/Partner, Friends \ neighbors, Child(manuel)  Patient Expects to be Discharged to[de-identified] [employer]  Current DME Used/Available at Home: Jennett Pun, straight, Walker, rolling (Pt states rolling WC too cumbersome for wife and him to use)  Tub or Shower Type: Tub/Shower combination (Pt reportedly bathes bedside with assist from wife)  [x]  Right hand dominant   []  Left hand dominant    Cognitive/Behavioral Status:  Neurologic State: Alert  Orientation Level: Oriented to person;Oriented to place;Oriented to situation  Cognition: Follows commands  Safety/Judgement: Fall prevention    Skin: Intact  Edema: None noted    Vision/Perceptual:                  Acuity: Within Defined Limits    Corrective Lenses: Glasses    Coordination: BUE     Fine Motor Skills-Upper: Left Intact; Right Intact    Gross Motor Skills-Upper: Left Intact; Right Intact    Balance:  Sitting: Intact  Standing:  (Pt declined standing, EOB, and OOB activity)    Strength: BUE    Strength: Generally decreased, functional                Tone & Sensation: BUE    Tone: Normal  Sensation: Intact         Range of Motion: BUE    AROM: Within functional limits          Functional Mobility and Transfers for ADLs:  Bed Mobility:  Supine to Sit: Stand-by assistance; Additional time  Sit to Supine: Stand-by assistance; Additional time  Scooting: Stand-by assistance; Additional time (towards EOB)    Transfers:  Sit to Stand:  (pt declined; 2 person assist next session 2/2 fear)      ADL Assessment:   Feeding: Setup    Oral Facial Hygiene/Grooming: Setup    Bathing: Moderate assistance    Upper Body Dressing: Stand-by assistance    Lower Body Dressing: Moderate assistance    Toileting: Minimum assistance         ADL Intervention:       Grooming  Grooming Assistance: Set-up; Stand-by assistance  Position Performed: Seated edge of bed  Washing Face: Stand-by assistance    Upper Body Dressing Assistance  Dressing Assistance: Set-up; Stand-by assistance  Hospital Gown: Stand-by assistance    Lower Body Dressing Assistance  Dressing Assistance:  (pt simulated reaching to knees  for donning pants)      Cognitive Retraining  Safety/Judgement: Fall prevention      Pain:  Pain level pre-treatment: 0/10   Pain level post-treatment: 0/10   Pain Intervention(s): Medication (see MAR); Response to intervention: Nurse notified, See doc flow    Activity Tolerance:   Fair    Please refer to the flowsheet for vital signs taken during this treatment.   After treatment:   [] Patient left in no apparent distress sitting up in chair  [x] Patient left in no apparent distress in bed  [x] Call bell left within reach  [x] Nursing notified  [] Caregiver present  [] Bed alarm activated    COMMUNICATION/EDUCATION:   [x] Role of Occupational Therapy in the acute care setting  [x] Home safety education was provided and the patient/caregiver indicated understanding. [x] Patient/family have participated as able in goal setting and plan of care. [x] Patient/family agree to work toward stated goals and plan of care. [] Patient understands intent and goals of therapy, but is neutral about his/her participation. [] Patient is unable to participate in goal setting and plan of care. Thank you for this referral.  Ledy Butler, OTR/L  Time Calculation: 23 mins    Eval Complexity: History: MEDIUM Complexity : Expanded review of history including physical, cognitive and psychosocial  history ; Examination: MEDIUM Complexity : 3-5 performance deficits relating to physical, cognitive , or psychosocial skils that result in activity limitations and / or participation restrictions; Decision Making:MEDIUM Complexity : Patient may present with comorbidities that affect occupational performnce.  Miniml to moderate modification of tasks or assistance (eg, physical or verbal ) with assesment(s) is necessary to enable patient to complete evaluation

## 2021-06-04 NOTE — CONSULTS
[facility]. Pulmonary, Critical Care, and Sleep Medicine    Initial Patient Consult    Name: Sangeeta Jarvis MRN: [id number]   : 1955 Hospital: [address] [place] [address] Dr   Date: 2021        IMPRESSION:   · RUL Cavitary pneumonia  · - likely chronic necrotizing infection, possible malignancy  · - No TB risk factors, EtOH abuse makes risk of anaerobic organisms. · Hyponatremia  · - chronic, likely SIADH 2/2 lung infection vs. Malignancy vs. Malnutrition  · Probable COPD  · - 40+ pack year smoker, emphysema on CT  · - on home albuterol  · B/L Leg weakness  · Weight loss  · Tobacco Abuse     Patient Active Problem List   Diagnosis Code    GERD (gastroesophageal reflux disease) K21.9    Sinusitis J32.9    ED (erectile dysfunction) N52.9    DJD (degenerative joint disease) of knee M17.10    Fall from standing W19. Caroline Quirk    Rib pain on left side R07.81    Pneumonia J18.9      RECOMMENDATIONS:   · RUL lesions likely represent a chronic infection- anaerobic vs. Fungal vs. AFB, but malignancy is also possible, especially in a high risk patient like him. Would treat with empiric broad spectrum ABX with anaerobic coverage for now  · Sputum Cx and AFB- may need to induce  · Consult to ID  · Pt does not endorse sputum production- if AFB cleared, he may need a bronchoscopy for diagnostic sampling. · Continue nebulizers, prn duonebs  · No need for steroids  · Bronchial hygiene protocol  · Out patient testing- PFT  · Assess home Oxygen needs at discharge  · OT, PT, OOB and ambulate  · Will Follow     Subjective: This patient has been seen and evaluated at the request of Dr. Johann Luz for lung consolidation. Patient is a 61 y.o. male with PMHx of EtOH abuse, tobacco abuse, and compression fractures who presented to ED with LE leg weakness.  However, he was found to have a large RUL consolidation on CXR so a CT chest was done which showed a large consolidation with air pockets and potential cavitation. He endorses chronic weight loss over the last several years, but currently denies any fevers/chills/night sweats. He does endorses a cough but it is dry without any sputum production. He drinks heavily every day and is retired. In the ED, he was found to be hyponatremic and with a leukocytosis of 13. He was admitted and started on Zosyn. On my assessment, he appears comfortable on RA. He denies dyspnea. He was told several years ago he has COPD but only takes albuterol. He has not seen a pulmonologist. He still smokes 1 ppd. Past Medical History:   Diagnosis Date    Ankle fracture, left 10/19/2008    Cough     DJD (degenerative joint disease) of knee 11/10/2010    ED (erectile dysfunction) 11/10/2010    Fracture of left tibia 10/19/2008    Severe Grade IIIB    GERD (gastroesophageal reflux disease) 11/10/2010    denies    Hearing loss     Left ankle pain     Left ankle sprain     Left ankle strain     Pain with urination     Sinusitis 11/10/2010    Wears glasses       Past Surgical History:   Procedure Laterality Date    HX ANKLE FRACTURE TX  10/20/2008    HX ORTHOPAEDIC      Several surgeries in the past which required ORIF of the distal tibia posterior approach and bone grafting x2 separate surgeries    [place] ANESTH,SURGERY OF SHOULDER      11/27/17      Prior to Admission medications    Medication Sig Start Date End Date Taking? Authorizing Provider   fluticasone propion-salmeteroL (ADVAIR/WIXELA) 250-50 mcg/dose diskus inhaler Take 1 Puff by inhalation every twelve (12) hours. 4/27/21  Yes Venita Hopkins MD   Calcium Carbonate-Vit D3-Min 600 mg calcium- 400 unit tab Take 1 Tab by mouth two (2) times a day.  With full glass of water  Patient not taking: Reported on 6/3/2021 8/20/20   Sherrill Walsh MD   DULoxetine (CYMBALTA) 20 mg capsule 1 tab po in the morning with food  Patient not taking: Reported on 6/3/2021 8/20/20   Sherrill Walsh MD   aspirin 81 mg chewable tablet Take 1 Tab by mouth daily. Patient not taking: Reported on 6/3/2021 12/17/19   EMILIANO Salgado     Allergies   Allergen Reactions    Niacin Rash      Social History     Tobacco Use    Smoking status: Current Every Day Smoker     Packs/day: 1.50     Types: Cigarettes    Smokeless tobacco: Never Used   Substance Use Topics    Alcohol use: Yes     Alcohol/week: 0.8 - 5.0 standard drinks     Types: 1 - 6 Cans of beer per week      Family History   Problem Relation Age of Onset   Rock Samples Migraines Mother     Hypertension Other     Arthritis-osteo Other         Current Facility-Administered Medications   Medication Dose Route Frequency    piperacillin-tazobactam (ZOSYN) 4.5 g in 0.9% sodium chloride (MBP/ADV) 100 mL MBP  4.5 g IntraVENous Q6H    heparin (porcine) injection 5,000 Units  5,000 Units SubCUTAneous Q8H    sodium chloride (NS) flush 5-40 mL  5-40 mL IntraVENous Q8H    therapeutic multivitamin (THERAGRAN) tablet 1 Tablet  1 Tablet Oral DAILY    0.9% sodium chloride infusion  75 mL/hr IntraVENous CONTINUOUS    arformoterol 15 mcg/budesonide 0.5 mg neb solution   Nebulization BID RT    guaiFENesin ER (MUCINEX) tablet 600 mg  600 mg Oral Q12H       Review of Systems:  Pertinent items are noted in HPI. ROS    Objective:   Vital Signs:    Visit Vitals  /73   Pulse 96   Temp 97.9 °F (36.6 °C)   Resp 20   Ht 6' (1.829 m)   Wt 54.4 kg (120 lb)   SpO2 98%   BMI 16.27 kg/m²       O2 Device: None (Room air)       Temp (24hrs), Av.1 °F (36.7 °C), Min:97.5 °F (36.4 °C), Max:98.7 °F (37.1 °C)       Intake/Output:   Last shift:       07 -  1900  In: 240 [P.O.:240]  Out: -   Last 3 shifts: No intake/output data recorded.     Intake/Output Summary (Last 24 hours) at 2021 1257  Last data filed at 2021 5898  Gross per 24 hour   Intake 240 ml   Output    Net 240 ml      Physical Exam:   General:  Alert, cooperative, no distress, very thin   Head:  Normocephalic, without obvious abnormality, atraumatic. Eyes:  Conjunctivae/corneas clear. ANicteric   Lungs:   Bilateral auscultation absent RUL BS, decreased B/L, no wheezing or rales. Chest wall:  No tenderness or deformity. NO CREPITUS   Heart:  Regular rate and rhythm, S1, S2 normal, no murmur, click, rub or gallop. Abdomen:   Soft, non-tender. Bowel sounds normal. No masses,  No organomegaly. No paradox   Extremities: normal, atraumatic, no cyanosis or edema.    Neurologic: Grossly nonfocal          Data review:   Labs:  Recent Results (from the past 24 hour(s))   POC LACTIC ACID    Collection Time: 06/03/21 12:59 PM   Result Value Ref Range    Lactic Acid (POC) 2.77 (HH) 0.40 - 2.00 mmol/L   LACTIC ACID    Collection Time: 06/03/21  8:10 PM   Result Value Ref Range    Lactic acid 1.0 0.4 - 2.0 MMOL/L   URINALYSIS W/ RFLX MICROSCOPIC    Collection Time: 06/04/21  3:02 AM   Result Value Ref Range    Color YELLOW      Appearance CLEAR      Specific gravity >1.030 (H) 1.005 - 1.030    pH (UA) 6.5 5.0 - 8.0      Protein Negative NEG mg/dL    Glucose Negative NEG mg/dL    Ketone Negative NEG mg/dL    Bilirubin Negative NEG      Blood Negative NEG      Urobilinogen 1.0 0.2 - 1.0 EU/dL    Nitrites Negative NEG      Leukocyte Esterase Negative NEG     COVID-19 RAPID TEST    Collection Time: 06/04/21  3:02 AM   Result Value Ref Range    Specimen source Nasopharyngeal      COVID-19 rapid test Not detected NOTD     SODIUM, UR, RANDOM    Collection Time: 06/04/21  3:02 AM   Result Value Ref Range    Sodium,urine random 33 20 - 012 MMOL/L   METABOLIC PANEL, BASIC    Collection Time: 06/04/21  3:05 AM   Result Value Ref Range    Sodium 131 (L) 136 - 145 mmol/L    Potassium 3.6 3.5 - 5.5 mmol/L    Chloride 100 100 - 111 mmol/L    CO2 24 21 - 32 mmol/L    Anion gap 7 3.0 - 18 mmol/L    Glucose 73 (L) 74 - 99 mg/dL    BUN 7 7.0 - 18 MG/DL    Creatinine 0.20 (L) 0.6 - 1.3 MG/DL    BUN/Creatinine ratio 35 (H) 12 - 20      GFR est AA >60 >60 ml/min/1.73m2 GFR est non-AA >60 >60 ml/min/1.73m2    Calcium 7.9 (L) 8.5 - 10.1 MG/DL   CBC WITH AUTOMATED DIFF    Collection Time: 06/04/21  3:05 AM   Result Value Ref Range    WBC 8.9 4.6 - 13.2 K/uL    RBC 2.86 (L) 4.35 - 5.65 M/uL    HGB 9.0 (L) 13.0 - 16.0 g/dL    HCT 26.3 (L) 36.0 - 48.0 %    MCV 92.0 74.0 - 97.0 FL    MCH 31.5 24.0 - 34.0 PG    MCHC 34.2 31.0 - 37.0 g/dL    RDW 13.4 11.6 - 14.5 %    PLATELET 787 470 - 271 K/uL    MPV 10.0 9.2 - 11.8 FL    NEUTROPHILS 75 (H) 40 - 73 %    BAND NEUTROPHILS 2 0 - 5 %    LYMPHOCYTES 11 (L) 21 - 52 %    MONOCYTES 8 3 - 10 %    EOSINOPHILS 3 0 - 5 %    BASOPHILS 0 0 - 2 %    MYELOCYTES 1 (H) 0 %    ABS. NEUTROPHILS 6.9 1.8 - 8.0 K/UL    ABS. LYMPHOCYTES 1.0 0.9 - 3.6 K/UL    ABS. MONOCYTES 0.7 0.05 - 1.2 K/UL    ABS. EOSINOPHILS 0.3 0.0 - 0.4 K/UL    ABS. BASOPHILS 0.0 0.0 - 0.1 K/UL    DF MANUAL      PLATELET COMMENTS ADEQUATE PLATELETS      RBC COMMENTS HERMILA CELLS  1+       GLUCOSE, POC    Collection Time: 06/04/21  6:28 AM   Result Value Ref Range    Glucose (POC) 87 70 - 110 mg/dL     Imaging:  I have personally reviewed the patients radiographs and have reviewed the reports:  CXR Results  (Last 48 hours)               06/03/21 1216  XR CHEST PA LAT Final result    Impression:      Interval development of extensive superior right upper lobe consolidative   changes and possible cavitation, differential of pneumonia including TB versus   neoplastic process or both. Narrative:  EXAM:  XR CHEST PA LAT       INDICATION:   cough, weakness and fatigue       COMPARISON: 6/30/2020. FINDINGS:   The cardiac silhouette is not enlarged. There is a large hiatal hernia. Interval development of extensive consolidative changes in the superior right   upper lobe and possibly cavitation. No pneumothorax or pleural effusion. Intact   osseous structures.                CT Results  (Last 48 hours)               06/03/21 1333  CTA CHEST W OR W WO CONT Final result Impression:          1. No convincing evidence of pulmonary embolism. 2.  Consolidation in the right upper lobe with multiple smaller pockets. At   least one of the pockets appear to be fairly prominent. Developing aggressive   infectious process is suspected. 3.  Enlarged mediastinal nodes. 4.  Chronic anterior wedging deformities at T3, T11 and T12. Narrative:  EXAM:  CTA Chest with Contrast (Study for PE). CLINICAL INDICATION:  Shortness of breath. COMPARISON:  CT 08/14/20, chest x-ray 06/30/20. TECHNIQUE:         - Helical volumetric sections of the chest are obtained with CT pulmonary   angiogram protocol. Subsequently, sagittal and coronal multiplanar   reconstruction images are obtained. Maximum intensity projection images are   generated to better delineate the pulmonary vasculature, differentiate between   the pulmonary arteries and veins and to increase sensitivity to pulmonary   emboli.     - IV contrast dose of 100 mL Isovue-370.   - Radiation dose optimization techniques are utilized as appropriate to the   exam, with combination of automated exposure control, adjustment of the mA   and/or kV according to patient's size (Including appropriate matching for   site-specific examinations), or use of iterative reconstruction technique. FINDINGS:        Pulmonary Arteries:  No convincing evidence of pulmonary embolism is detected   bilaterally. Lung, Pleura, Airways:         - Right upper lobe consolidation with interspersed air pockets. The largest air   pocket and the right upper lobe measures up to about 4.6 x 2.9 cm (axial #16). - Right lower lobe patchy airspace opacities are noted in the posterior medial   aspect. - Emphysematous changes are noted bilaterally. - Atelectatic changes related to large hiatal hernia. - Multiple bullae/ blebs.        Mediastinum:  Enlarged subcarinal node measuring about 1.7 x 1.3 cm (axial #118).  Right hilar node measuring about 1.2 x 0.9 cm. Precarinal node   measuring about 2.0 x 1.7 cm (axial #107). Aorta:  No evidence of aortic dissection or aneurysm. Base of Neck:  No acute findings. Chest Wall, Axillae:  Unremarkable. Upper Abdomen:  No acute abnormalities. Skeletal Structures:  Anterior wedging deformity at T3, T11 and T12. Similar to   08/14/20. High complexity decision making was performed during the evaluation of this patient at high risk for decompensation with multiple organ involvement     Above mentioned total time spent on reviewing the case/medical record/data/notes/EMR/patient examination/documentation/coordinating care with nurse/consultants, exclusive of procedures with complex decision making performed and > 50% time spent in face to face evaluation.      Aki Frances MD

## 2021-06-04 NOTE — CONSULTS
Consult    Patient: Montana Otero MRN: [id number]  SSN: [id number]    YOB: 1955  Age: 61 y.o. Sex: male      Subjective:      Montana Otero is a 61 y.o. male who is being seen for weakness  Patient is admitted with pneumonia, anemia, hyponatremia. A retired shipyard worker with long smoking history  With chronic weight loss. MRI of Cervical thoracic and lumbar spine done in ER demonstrate chronic compression deformities of T11, T12. No evidence of malignancy, or cord compression. .    Patient denies back pain. Says his legs had felt weak lately. Denies bowel or bladder dysfunction. Past Medical History:   Diagnosis Date    Ankle fracture, left 10/19/2008    Cough     DJD (degenerative joint disease) of knee 11/10/2010    ED (erectile dysfunction) 11/10/2010    Fracture of left tibia 10/19/2008    Severe Grade IIIB    GERD (gastroesophageal reflux disease) 11/10/2010    denies    Hearing loss     Left ankle pain     Left ankle sprain     Left ankle strain     Pain with urination     Sinusitis 11/10/2010    Wears glasses      Past Surgical History:   Procedure Laterality Date    HX ANKLE FRACTURE TX  10/20/2008    HX ORTHOPAEDIC      Several surgeries in the past which required ORIF of the distal tibia posterior approach and bone grafting x2 separate surgeries    [place] ANESTH,SURGERY OF SHOULDER      11/27/17      Family History   Problem Relation Age of Onset    Migraines Mother     Hypertension Other     Arthritis-osteo Other      Social History     Tobacco Use    Smoking status: Current Every Day Smoker     Packs/day: 1.50     Types: Cigarettes    Smokeless tobacco: Never Used   Substance Use Topics    Alcohol use:  Yes     Alcohol/week: 0.8 - 5.0 standard drinks     Types: 1 - 6 Cans of beer per week      Current Facility-Administered Medications   Medication Dose Route Frequency Provider Last Rate Last Admin    sodium chloride (NS) flush 5-10 mL  5-10 mL IntraVENous PRN Sonna Brown, NP        piperacillin-tazobactam (ZOSYN) 4.5 g in 0.9% sodium chloride (MBP/ADV) 100 mL MBP  4.5 g IntraVENous Q6H Ezzie Levo S,  mL/hr at 06/04/21 0419 4.5 g at 06/04/21 0419    levoFLOXacin (LEVAQUIN) 750 mg in D5W IVPB  750 mg IntraVENous Q24H Ezzie Levo S,  mL/hr at 06/03/21 1305 750 mg at 06/03/21 1305    vancomycin (VANCOCIN) 750 mg in 0.9% sodium chloride 250 mL (VIAL-MATE)  750 mg IntraVENous Q8H Ezzie Levo S,  mL/hr at 06/04/21 0624 750 mg at 06/04/21 0624    ondansetron (ZOFRAN) injection 4 mg  4 mg IntraVENous Q4H PRN Sonna [place], NP        acetaminophen (TYLENOL) tablet 650 mg  650 mg Oral Q6H PRN Sonna [place], NP        Or    acetaminophen (TYLENOL) suppository 650 mg  650 mg Rectal Q6H PRN Sonna Brown, NP        polyethylene glycol (MIRALAX) packet 17 g  17 g Oral DAILY PRN Sonna [place], NP        ondansetron (ZOFRAN ODT) tablet 4 mg  4 mg Oral Q8H PRN Sonna [place], NP        heparin (porcine) injection 5,000 Units  5,000 Units SubCUTAneous Q8H Ezzie Levo S, NP   5,000 Units at 06/04/21 0412    sodium chloride (NS) flush 5-40 mL  5-40 mL IntraVENous Q8H Ezzie Levo S, NP   10 mL at 06/03/21 2319    therapeutic multivitamin (THERAGRAN) tablet 1 Tablet  1 Tablet Oral DAILY Sonna Brown, NP        0.9% sodium chloride infusion  75 mL/hr IntraVENous CONTINUOUS Sonna Brown, NP 75 mL/hr at 06/03/21 2104 75 mL/hr at 06/03/21 2104    arformoterol 15 mcg/budesonide 0.5 mg neb solution   Nebulization BID RT Sonna [place], NP   Given at 06/03/21 2152    guaiFENesin ER (MUCINEX) tablet 600 mg  600 mg Oral Q12H Ezzie Levo S, NP   600 mg at 06/03/21 3264        Allergies   Allergen Reactions    Niacin Rash       Review of Systems:  Pertinent items are noted in the History of Present Illness.     Objective:     Vitals:    06/03/21 2033 06/03/21 2152 06/03/21 2343 06/04/21 0419   BP: 118/77 103/66 137/81   Pulse: 80  91 100   Resp: 18  18 20   Temp: 98.7 °F (37.1 °C)  97.5 °F (36.4 °C) 98.7 °F (37.1 °C)   SpO2: 97% 97% 92% 95%   Weight:       Height:            Physical Exam:  General:  Alert, cooperative, no distress, cachectic, emaciated   Eyes:  Conjunctivae/corneas clear. PERRL, EOMs intact. Nose: Nares normal. Septum midline. Mucosa normal. No drainage or sinus tenderness. Mouth/Throat: Lips, mucosa, and tongue normal. Teeth and gums normal.   Neck: Supple, symmetrical, trachea midline, no adenopathy, thyroid: no enlargment/tenderness/nodules, no carotid bruit and no JVD. Back:   Symmetric,TL kyphosis, ROM normal. No CVA tenderness. Abdomen:   Soft, non-tender. Bowel sounds normal. No masses,  No organomegaly. Extremities: emciated no cyanosis or edema. Pulses: 2+ and symmetric all extremities. Skin: Skin color, texture, turgor normal. No rashes or lesions   Lymph nodes: Cervical, supraclavicular, and axillary nodes normal.   Neurologic: CNII-XII intact. Normal strength, sensation and reflexes throughout. MRI of spinal axis without acute or significant spinal pathology on my review. Official reading pending. spinal pathol  Assessment:       Patient weak from sepsis, hypoxia,and  hyponatremia. No evident spinal pathology. No complaints of back pain or radiculopathy. Plan:     No spinal pathology requiring treatment at this time. Please re-consult if questions arise.     Signed By: Víctor Oseguera MD     June 4, 2021

## 2021-06-04 NOTE — PROGRESS NOTES
Problem: Mobility Impaired (Adult and Pediatric)  Goal: *Acute Goals and Plan of Care (Insert Text)  6/4/2021 1220 by Aparna Ortega PT  Outcome: Progressing Towards Goal  6/4/2021 1218 by Aparna Ortega PT  Note: Physical Therapy Goals  Initiated 6/4/2021 and to be accomplished within 7 day(s)  1. Patient will move from supine to sit and sit to supine , scoot up and down, and roll side to side in bed with independence. 2.  Patient will transfer from bed to chair and chair to bed with minimal assistance/contact guard assist using the least restrictive device. 3.  Patient will perform sit to stand with minimal assistance/contact guard assist.  4.  Patient will ambulate with minimal assistance/contact guard assist for 25 feet with the least restrictive device. PLOF: Pt used SPC intermittently for household negotiation, had wife assist with medical transport and setting up [facility] (too cumbersome per pt report) for community negotiation     PHYSICAL THERAPY EVALUATION    Patient: Susana Santana (12 y.o. male)  Date: 6/4/2021  Primary Diagnosis: Pneumonia [J18.9]    Precautions: Pressure Injury Risk, Falls Risk       PLOF: Pt used SPC intermittently for household negotiation, had wife assist with medical transport and setting up  (too cumbersome per pt report) for community negotiation    ASSESSMENT :  Based on the objective data described below, the patient presents with clinically significant decrease functional mobility, activity tolerance, balance, inc falls risk below functional baseline PTA. Pt resistant to EOB and OOB mobility this date, refusing to perform standing or ambulation within room as he was \"too tired\" after OT came approximately 15 minutes prior. Patient will benefit from skilled intervention to address the above impairments.   Patient's rehabilitation potential is considered to be Fair  Factors which may influence rehabilitation potential include:   []         None noted  [x] Mental ability/status  [x]         Medical condition  [x]         Home/family situation and support systems  [x]         Safety awareness  [x]         Pain tolerance/management  []         Other:      PLAN :  Recommendations and Planned Interventions:   [x]           Bed Mobility Training             [x]    Neuromuscular Re-Education  [x]           Transfer Training                   []    Orthotic/Prosthetic Training  [x]           Gait Training                          [x]    Modalities  [x]           Therapeutic Exercises           []    Edema Management/Control  [x]           Therapeutic Activities            [x]    Family Training/Education  [x]           Patient Education  []           Other (comment):    Frequency/Duration: Patient will be followed by physical therapy 1-2 times per day/4-7 days per week to address goals. Discharge Recommendations: To Be Determined and SNF vs ARU  Further Equipment Recommendations for Discharge: wheelchair 18 inch     SUBJECTIVE:   Patient stated I am fearful of falling.     OBJECTIVE DATA SUMMARY:     Past Medical History:   Diagnosis Date    Ankle fracture, left 10/19/2008    Cough     DJD (degenerative joint disease) of knee 11/10/2010    ED (erectile dysfunction) 11/10/2010    Fracture of left tibia 10/19/2008    Severe Grade IIIB    GERD (gastroesophageal reflux disease) 11/10/2010    denies    Hearing loss     Left ankle pain     Left ankle sprain     Left ankle strain     Pain with urination     Sinusitis 11/10/2010    Wears glasses      Past Surgical History:   Procedure Laterality Date    HX ANKLE FRACTURE TX  10/20/2008    HX ORTHOPAEDIC      Several surgeries in the past which required ORIF of the distal tibia posterior approach and bone grafting x2 separate surgeries    [place]SURGERY OF [place]OULD[place]      11/27/17     Barriers to Learning/Limitations: yes;  physical  Compensate with: Visual Cues, Verbal Cues, Tactile Cues and Kinesthetic Cues  Home Situation:  Home Situation  Home Environment: Private residence  # Steps to Enter: 5  Rails to Enter: Yes  Hand Rails : Bilateral (Cannot use simultaneously)  One/Two Story Residence: Two story  # of Interior Steps: 12  Interior Rails: Left (During descent)  Lift Chair Available: No  Living Alone: No  Support Systems: Spouse/Significant Other/Partner, Friends \ neighbors, Child(manuel)  Patient Expects to be Discharged to[de-identified] [employer]  Current DME Used/Available at Home: [place], straight, Walker, rolling (Pt states rolling WC too cumbersome for wife and him to use)  Tub or Shower Type: Tub/Shower combination (Pt reportedly bathes bedside with assist from wife)     Strength:  Grossly 3/5 bilat     Range Of Motion: Grossly WFL bilat     Functional Mobility:  Bed Mobility:  Rolling: Modified independent  Supine to Sit: Modified independent  Sit to Supine: Modified independent  Scooting: Modified independent  Transfers:  Sit to Stand: Other (comment) (Unable to assess 2/2 pt refusal)  Balance:   Sitting: Intact  Standing:  (Pt declined standing, EOB, and OOB activity)  Ambulation/Gait Training:  Gait Description (WDL):  (Unable to assess 2/2 pt refusal)    Pain:  Pain level pre-treatment: 0/10   Pain level post-treatment: 0/10   Pain Intervention(s): N/A  Response to intervention: N/A    Activity Tolerance:   Poor  Please refer to the flowsheet for vital signs taken during this treatment. After treatment:   []         Patient left in no apparent distress sitting up in chair  [x]         Patient left in no apparent distress in bed  [x]         Call bell left within reach  [x]         Nursing notified  []         Caregiver present  []         Bed alarm activated  [x]         SCDs applied    COMMUNICATION/EDUCATION:   [x]         Role of Physical Therapy in the acute care setting. [x]         Fall prevention education was provided and the patient/caregiver indicated understanding.   [x]         Patient/family have participated as able in goal setting and plan of care. [x]         Patient/family agree to work toward stated goals and plan of care. []         Patient understands intent and goals of therapy, but is neutral about his/her participation. []         Patient is unable to participate in goal setting/plan of care: ongoing with therapy staff.  []         Other:     Thank you for this referral.  Sherie Weber, PT   Time Calculation: 29 mins      Eval Complexity: History: HIGH Complexity :3+ comorbidities / personal factors will impact the outcome/ POC Exam:MEDIUM Complexity : 3 Standardized tests and measures addressing body structure, function, activity limitation and / or participation in recreation  Presentation: MEDIUM Complexity : Evolving with changing characteristics  Clinical Decision Making:Medium Complexity   Overall Complexity:MEDIUM

## 2021-06-04 NOTE — CONSULTS
Infectious Disease Consultation Note        Reason: RUL cavitary pneumonia    Current abx Prior abx   Piperacillin/tazobactam, levofloxacin, vancomycin since 6/3/2021      Lines:       Assessment :     61 y.o.  male with hx of DJD, COPD, chronic compression fractures of T11/T12 vertebrae, current tobacco abuse who presented to the ED on 6/3/2021 with acute onset BLE weakness.      CTA chest negative for PE, consolidation and right upper lobe with multiple smaller pockets, developing aggressive infectious process suspected, enlarged mediastinal nodes, chronic anterior wedging deformities at T3, T11 and T12. Clinical presentation consistent with chronic necrotizing pneumonia right upper lung-exact etiology not entirely clear at this time. Differential diagnosis includes: Malignancy/indolent infection such as tuberculosis/chronic silent aspiration pneumonia    Lack of high-grade fever, lack of leukocytosis, duration of symptoms argues against atypical community-acquired pneumonia, MRSA pneumonia. Lack of night sweats, fevers argues against tuberculosis. History of smoking, weight loss, duration of symptoms likely point out to malignancy. Recommendations:    1. Continue piperacillin/tazobactam. Discontinue levofloxacin, vancomycin since low clinical probability of acute bacterial pneumonia  2. Obtain sputum culture, sputum AFB every 8 hours x 3 -I obtained first specimen and gave it to RN  3. Nasal MRSA swab  4. Agree with plan for bronchoscopy/biopsy if sputum AFB negative  5. Monitor clinically      Thank you for consultation request. Above plan was discussed in details with patient, RN and dr Rody Manjarrez. Please call me if any further questions or concerns. Will continue to participate in the care of this patient. HPI:     61 y.o.   male with hx of DJD, COPD, chronic compression fractures of T11/T12 vertebrae, current tobacco abuse who presented to the ED on 6/3/2021 with acute onset BLE weakness.    In the ED, pt noted with WBC 13.4, Hgb 9.5, Hct 27.7, sodium 126, potassium 3.6, glucose 107, BUN 7, creatinine 0.30, albumin 1.5, ALT 61, AST 51. L spine xray osteopenia, chronic compression fracture deformities at T12, L3 and L4. CXR interval development of extensive superior right upper lobe consolidative changes with possible cavitation-differential of pneumonia including TB versus neoplastic process or both. CTA chest negative for PE, consolidation and right upper lobe with multiple smaller pockets, developing aggressive infectious process suspected, enlarged mediastinal nodes, chronic anterior wedging deformities at T3, T11 and T12. Sepsis protocol initiated. Blood cultures obtained x 2. Patient started on IV vancomycin, Zosyn, Levaquin and given 1 L bolus normal saline. MRI spine obtained. Dr. Amanda Hernandez consulted by the ED. No evident spinal pathology. No further intervention recommended from spine surgery standpoint. Patient was evaluated by pulmonologist today. There is concern for indolent infection versus malignancy. I have been consulted for further recommendations. Pt also c/o non-productive cough for the past month. He denies decreased appetite. Per admitting hospitalist patient complained of decreasing appetite. [facility] He smokes 1.5 pack of cigarettes per day for the past 40 years. No shortness of breath, chest pain, fever/chills, N/V, bowel/bladder incontinence. Patient denies significant weight loss. Per history obtained from patient's wife, he has significant weight loss over the past 2 years, she thought it was due to his drinking. Patient states he drinks several beers daily. Patient denies any night sweats. No history of tuberculosis in the past. No exposure to anyone with tuberculosis. 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 Denies HIV risk factors.     Past Medical History:   Diagnosis Date    Ankle fracture, left 10/19/2008    Cough     DJD (degenerative joint disease) of knee 11/10/2010    ED (erectile dysfunction) 11/10/2010    Fracture of left tibia 10/19/2008    Severe Grade IIIB    GERD (gastroesophageal reflux disease) 11/10/2010    denies    Hearing loss     Left ankle pain     Left ankle sprain     Left ankle strain     Pain with urination     Sinusitis 11/10/2010    Wears glasses        Past Surgical History:   Procedure Laterality Date    HX ANKLE FRACTURE TX  10/20/2008    HX ORTHOPAEDIC      Several surgeries in the past which required ORIF of the distal tibia posterior approach and bone grafting x2 separate surgeries    [place] ANESTH,SURGERY OF SHOULDER      11/27/17       home Medication List    Details   fluticasone propion-salmeteroL (ADVAIR/WIXELA) 250-50 mcg/dose diskus inhaler Take 1 Puff by inhalation every twelve (12) hours. Qty: 1 Inhaler, Refills: 3      Calcium Carbonate-Vit D3-Min 600 mg calcium- 400 unit tab Take 1 Tab by mouth two (2) times a day. With full glass of water  Qty: 60 Tab, Refills: 5      DULoxetine (CYMBALTA) 20 mg capsule 1 tab po in the morning with food  Qty: 30 Cap, Refills: 1      aspirin 81 mg chewable tablet Take 1 Tab by mouth daily.   Qty: 1 Tab, Refills: 0             Current Facility-Administered Medications   Medication Dose Route Frequency    VANCOMYCIN TROUGH DUE   Other Daily    sodium chloride (NS) flush 5-10 mL  5-10 mL IntraVENous PRN    piperacillin-tazobactam (ZOSYN) 4.5 g in 0.9% sodium chloride (MBP/ADV) 100 mL MBP  4.5 g IntraVENous Q6H    levoFLOXacin (LEVAQUIN) 750 mg in D5W IVPB  750 mg IntraVENous Q24H    vancomycin (VANCOCIN) 750 mg in 0.9% sodium chloride 250 mL (VIAL-MATE)  750 mg IntraVENous Q8H    ondansetron (ZOFRAN) injection 4 mg  4 mg IntraVENous Q4H PRN    acetaminophen (TYLENOL) tablet 650 mg  650 mg Oral Q6H PRN    Or    acetaminophen (TYLENOL) suppository 650 mg  650 mg Rectal Q6H PRN    polyethylene glycol (MIRALAX) packet 17 g  17 g Oral DAILY PRN    ondansetron (ZOFRAN ODT) tablet 4 mg  4 mg Oral Q8H PRN    heparin (porcine) injection 5,000 Units  5,000 Units SubCUTAneous Q8H    sodium chloride (NS) flush 5-40 mL  5-40 mL IntraVENous Q8H    therapeutic multivitamin (THERAGRAN) tablet 1 Tablet  1 Tablet Oral DAILY    0.9% sodium chloride infusion  75 mL/hr IntraVENous CONTINUOUS    arformoterol 15 mcg/budesonide 0.5 mg neb solution   Nebulization BID RT    guaiFENesin ER (MUCINEX) tablet 600 mg  600 mg Oral Q12H       Allergies: Niacin    Family History   Problem Relation Age of Onset    Migraines Mother     Hypertension Other     Arthritis-osteo Other      Social History     Socioeconomic History    Marital status:      Spouse name: Not on file    Number of children: Not on file    Years of education: Not on file    Highest education level: Not on file   Occupational History    Not on file   Tobacco Use    Smoking status: Current Every Day Smoker     Packs/day: 1.50     Types: Cigarettes    Smokeless tobacco: Never Used   Substance and Sexual Activity    Alcohol use: Yes     Alcohol/week: 0.8 - 5.0 standard drinks     Types: 1 - 6 Cans of beer per week    Drug use: No    Sexual activity: Not Currently   Other Topics Concern    Not on file   Social History Narrative    Not on file     Social Determinants of Health     Financial Resource Strain:     Difficulty of Paying Living Expenses:    Food Insecurity:     Worried About Running Out of Food in the Last Year:     Ran Out of Food in the Last Year:    Transportation Needs:     Lack of Transportation (Medical):      Lack of Transportation (Non-Medical):    Physical Activity:     Days of Exercise per Week:     Minutes of Exercise per Session:    Stress:     Feeling of Stress :    Social Connections:     Frequency of Communication with Friends and Family:     Frequency of Social Gatherings with Friends and Family:     Attends Religion Services:     Active Member of Clubs or Organizations:     Attends Club or Organization Meetings:     Marital Status:    Intimate Partner Violence:     Fear of Current or Ex-Partner:     Emotionally Abused:     Physically Abused:     Sexually Abused:      Social History     Tobacco Use   Smoking Status Current Every Day Smoker    Packs/day: 1.50    Types: Cigarettes   Smokeless Tobacco Never Used        Temp (24hrs), Av.1 °F (36.7 °C), Min:97.5 °F (36.4 °C), Max:98.7 °F (37.1 °C)    Visit Vitals  /73   Pulse 96   Temp 97.9 °F (36.6 °C)   Resp 20   Ht 6' (1.829 m)   Wt 54.4 kg (120 lb)   SpO2 98%   BMI 16.27 kg/m²       ROS: 12 point ROS obtained in details. Pertinent positives as mentioned in HPI,   otherwise negative    Physical Exam:    General:  Alert, cooperative, no distress, very thin, coughing episodes while I was in room   Head:  Normocephalic, without obvious abnormality, atraumatic. Eyes:  Conjunctivae/corneas clear. ANicteric   Lungs:   Bilateral chest movements equal, absent RUL BS, decreased B/L, no wheezing or rales. Chest wall:  No tenderness or deformity. Heart:  Regular rate and rhythm, S1, S2 normal, no  click, rub or gallop. Abdomen:   Soft, non-tender. Bowel sounds normal. No masses,  No organomegaly. Extremities: normal, atraumatic, no cyanosis or edema.    Neurologic: Grossly nonfocal  Skin: no rash or ulcers  Psychiatry: appropriate mood and affect  Back: no spinal/paraspinal muscle tenderness or rigidity                                  Labs: Results:   Chemistry Recent Labs     21  0305 21  1113   GLU 73* 107*   * 126*   K 3.6 3.6    94*   CO2 24 24   BUN 7 7   CREA 0.20* 0.30*   CA 7.9* 8.4*   AGAP 7 8   BUCR 35* 23*   AP  --  126*   TP  --  6.5   ALB  --  1.5*   GLOB  --  5.0*   AGRAT  --  0.3*      CBC w/Diff Recent Labs     21  0305 21  1113   WBC 8.9 13.4*   RBC 2.86* 3.04*   HGB 9.0* 9.5*   HCT 26.3* 27.7*    365   GRANS 75* 77*   LYMPH 11* 12*   EOS 3 2      Microbiology Recent Labs     06/03/21  1253 06/03/21  1114   CULT NO GROWTH AFTER 18 HOURS NO GROWTH AFTER 18 HOURS          RADIOLOGY:    All available imaging studies/reports in [facility] for this admission were reviewed      Disclaimer: Sections of this note are dictated utilizing voice recognition software, which may have resulted in some phonetic based errors in grammar and contents. Even though attempts were made to correct all the mistakes, some may have been missed, and remained in the body of the document. If questions arise, please contact our department.     Dr. Jonnie De Anda, Infectious Disease Specialist  [phone]  June 4, 2021  12:22 PM

## 2021-06-04 NOTE — PROGRESS NOTES
Problem: Patient Education: Go to Patient Education Activity  Goal: Patient/Family Education  Outcome: Progressing Towards Goal     Problem: Pneumonia: Day 1  Goal: Off Pathway (Use only if patient is Off Pathway)  Outcome: Progressing Towards Goal  Goal: Activity/Safety  Outcome: Progressing Towards Goal  Goal: Consults, if ordered  Outcome: Progressing Towards Goal  Goal: Diagnostic Test/Procedures  Outcome: Progressing Towards Goal  Goal: Nutrition/Diet  Outcome: Progressing Towards Goal  Goal: Medications  Outcome: Progressing Towards Goal  Goal: Respiratory  Outcome: Progressing Towards Goal  Goal: Treatments/Interventions/Procedures  Outcome: Progressing Towards Goal  Goal: Psychosocial  Outcome: Progressing Towards Goal  Goal: *Oxygen saturation within defined limits  Outcome: Progressing Towards Goal  Goal: *Influenza vaccine administered (October-March)  Outcome: Progressing Towards Goal  Goal: *Pneumoccocal vaccine administered  Outcome: Progressing Towards Goal  Goal: *Hemodynamically stable  Outcome: Progressing Towards Goal  Goal: *Demonstrates progressive activity  Outcome: Progressing Towards Goal  Goal: *Tolerating diet  Outcome: Progressing Towards Goal     Problem: Pneumonia: Day 2  Goal: Off Pathway (Use only if patient is Off Pathway)  Outcome: Progressing Towards Goal  Goal: Activity/Safety  Outcome: Progressing Towards Goal  Goal: Consults, if ordered  Outcome: Progressing Towards Goal  Goal: Diagnostic Test/Procedures  Outcome: Progressing Towards Goal  Goal: Nutrition/Diet  Outcome: Progressing Towards Goal  Goal: Discharge Planning  Outcome: Progressing Towards Goal  Goal: Medications  Outcome: Progressing Towards Goal  Goal: Respiratory  Outcome: Progressing Towards Goal  Goal: Treatments/Interventions/Procedures  Outcome: Progressing Towards Goal  Goal: Psychosocial  Outcome: Progressing Towards Goal  Goal: *Oxygen saturation within defined limits  Outcome: Progressing Towards Goal  Goal: *Hemodynamically stable  Outcome: Progressing Towards Goal  Goal: *Demonstrates progressive activity  Outcome: Progressing Towards Goal  Goal: *Tolerating diet  Outcome: Progressing Towards Goal  Goal: *Optimal pain control at patient's stated goal  Outcome: Progressing Towards Goal     Problem: Pneumonia: Day 3  Goal: Off Pathway (Use only if patient is Off Pathway)  Outcome: Progressing Towards Goal  Goal: Activity/Safety  Outcome: Progressing Towards Goal  Goal: Consults, if ordered  Outcome: Progressing Towards Goal  Goal: Diagnostic Test/Procedures  Outcome: Progressing Towards Goal  Goal: Nutrition/Diet  Outcome: Progressing Towards Goal  Goal: Discharge Planning  Outcome: Progressing Towards Goal  Goal: Medications  Outcome: Progressing Towards Goal  Goal: Respiratory  Outcome: Progressing Towards Goal  Goal: Treatments/Interventions/Procedures  Outcome: Progressing Towards Goal  Goal: Psychosocial  Outcome: Progressing Towards Goal  Goal: *Oxygen saturation within defined limits  Outcome: Progressing Towards Goal  Goal: *Hemodynamically stable  Outcome: Progressing Towards Goal  Goal: *Demonstrates progressive activity  Outcome: Progressing Towards Goal  Goal: *Tolerating diet  Outcome: Progressing Towards Goal  Goal: *Describes available resources and support systems  Outcome: Progressing Towards Goal  Goal: *Optimal pain control at patient's stated goal  Outcome: Progressing Towards Goal     Problem: Pneumonia: Day 4  Goal: Off Pathway (Use only if patient is Off Pathway)  Outcome: Progressing Towards Goal  Goal: Activity/Safety  Outcome: Progressing Towards Goal  Goal: Nutrition/Diet  Outcome: Progressing Towards Goal  Goal: Discharge Planning  Outcome: Progressing Towards Goal  Goal: Medications  Outcome: Progressing Towards Goal  Goal: Respiratory  Outcome: Progressing Towards Goal  Goal: Treatments/Interventions/Procedures  Outcome: Progressing Towards Goal  Goal: Psychosocial  Outcome: Progressing Towards Goal     Problem: Pneumonia: Discharge Outcomes  Goal: *Demonstrates progressive activity  Outcome: Progressing Towards Goal  Goal: *Describes follow-up/return visits to physicians  Outcome: Progressing Towards Goal  Goal: *Tolerating diet  Outcome: Progressing Towards Goal  Goal: *Verbalizes name, dosage, time, side effects, and number of days to continue medications  Outcome: Progressing Towards Goal  Goal: *Influenza immunization  Outcome: Progressing Towards Goal  Goal: *Pneumococcal immunization  Outcome: Progressing Towards Goal  Goal: *Respiratory status at baseline  Outcome: Progressing Towards Goal  Goal: *Vital signs within defined limits  Outcome: Progressing Towards Goal  Goal: *Describes available resources and support systems  Outcome: Progressing Towards Goal  Goal: *Optimal pain control at patient's stated goal  Outcome: Progressing Towards Goal

## 2021-06-04 NOTE — PROGRESS NOTES
conducted an initial consultation and Spiritual Assessment for Floyd De Luna, who is a 61 y.o.,male. Patient's Primary Language is: [place]. According to the patient's EMR Bahai Affiliation is: [facility].     The reason the Patient came to the hospital is:   Patient Active Problem List    Diagnosis Date Noted    Pneumonia 06/03/2021    Fall from standing 12/16/2016    Rib pain on left side 12/16/2016    GERD (gastroesophageal reflux disease) 11/10/2010    Sinusitis 11/10/2010    ED (erectile dysfunction) 11/10/2010    DJD (degenerative joint disease) of knee 11/10/2010        The  provided the following Interventions:   was unable to assess during physical therapy visit. Plan:  Chaplains will continue to follow and will provide pastoral care on an as needed/requested basis.  recommends bedside caregivers page  on duty if patient shows signs of acute spiritual or emotional distress.     [address] [facility]   [phone]

## 2021-06-04 NOTE — CONSULTS
Comprehensive Nutrition Assessment    Type and Reason for Visit: Initial, Positive nutrition screen, Consult    Nutrition Recommendations/Plan:   -Continue Regular diet as tolerated and encourage PO intake >/=75% of meals  -Will add Ensure Compact BID and [place] Instant supplement  -Continue MVI and IVF per MD  -Continue to monitor wt trends, PO intakes, BMs, labs, and skin integrity. Nutrition Assessment:  61 yr old MALE admitted d/t Pneumonia. Per MD notes pt c/o non-productive cough for the past month, decreased appetite and fatigue for the past year. Pt smokes 1.5 pack of cigarettes per day for the past 40 years. Per MD notes wife reports a significant weight loss over the past 2 years, she thought it was due to his drinking. Patient states he drinks several beers daily. Per chart review pt lost >20#s five years ago, possibly d/t smoking and COPD as pt reports eating well at home. Wt has been stable at 115-120# the past 4 years. Noticed tips of fingernails are black when performed NFPE. Pt has severe muscle wasting throughout his body with bony prominences. PTA admission pt reports eating well and drank carnation instant breakfast supplements. Pt reports eating meals well in hospital, last meal was between 26-50% PO intake. Malnutrition Assessment:  Malnutrition Status:  Severe malnutrition    Context:  Chronic illness     Findings of the 6 clinical characteristics of malnutrition:   Energy Intake:  No significant decrease in energy intake  Weight Loss:  7.000 - Greater than 20% over 1 year     Body Fat Loss:  7 - Severe body fat loss, Orbital, Triceps, Buccal region   Muscle Mass Loss:  7 - Severe muscle mass loss, Hand (interosseous), Thigh (quadraceps), Temples (temporalis), Clavicles (pectoralis &deltoids), Scapula (trapezius)  Fluid Accumulation:  No significant fluid accumulation,     Strength:  Not performed     Nutrition History and Allergies:  PMHx: DJD, COPD, chronic compression fractures of T11/T12 vertebrae, current tobacco abuse who presented to the ED with acute onset BLE weakness. Niacin allergy. Estimated Daily Nutrient Needs:  Energy (kcal): 7204-1497 (1.3-1.5); Weight Used for Energy Requirements: Current  Protein (g):  (1.5-2); Weight Used for Protein Requirements: Current  Fluid (ml/day): 5424-6492; Method Used for Fluid Requirements: 1 ml/kcal      Nutrition Related Findings:  Last BM PTA; MVI, Zosyn, and 0.9% NaCl infusion at 75 mL/hr; Na 131L and Glucose 73L      Wounds:    Pressure injury, Stage I (sacral)       Current Nutrition Therapies:  ADULT DIET Regular  ADULT ORAL NUTRITION SUPPLEMENT Breakfast, Lunch, Dinner; Standard High Calorie/High Protein    Anthropometric Measures:  · Height:  6' (182.9 cm)  · Current Body Wt:  54.4 kg (120 lb)   · Admission Body Wt:  120 lb    · Usual Body Wt:   (140-160# five yrs ago)     · Ideal Body Wt:  178 lbs:  67.4 %   · Adjusted Body Weight:   ; Weight Adjustment for: No adjustment   · BMI Category:  Underweight (BMI less than 18.5)       Nutrition Diagnosis:   · Unintended weight loss related to catabolic illness (COPD) as evidenced by severe muscle loss, weight loss, severe loss of subcutaneous fat      Nutrition Interventions:   Food and/or Nutrient Delivery: Continue current diet, Start oral nutrition supplement, Vitamin supplement  Nutrition Education and Counseling: No recommendations at this time  Coordination of Nutrition Care: Continue to monitor while inpatient    Goals:  PO nutrition intake will meet >75% of patient estimated nutritional needs within the next 7 days.        Nutrition Monitoring and Evaluation:   Behavioral-Environmental Outcomes: None identified  Food/Nutrient Intake Outcomes: Food and nutrient intake, Supplement intake, Vitamin/mineral intake  Physical Signs/Symptoms Outcomes: Nutrition focused physical findings, Skin, Weight    Discharge Planning:    Continue current diet, Continue oral nutrition supplement     Electronically signed by Jet Marshall RD on 6/4/2021 at 12:47 PM    Contact: [phone]

## 2021-06-04 NOTE — PROGRESS NOTES
[facility] Hospitalist Group  Progress Note    Patient: Maryann Brannon Age: 61 y.o. : 1955 MR#: [id number] SSN: [id number]  Date/Time: 2021    Subjective:     Patient is sitting in bed in no apparent distress, awake and alert    Assessment/Plan:     Right upper lobe pneumonia with cavitation  Likely underlying COPD  Tobacco abuse  Hyponatremia  Bilateral lower extremity weakness    Plan  Continue Zosyn, follow cultures  Airborne precautions, AFB smears per ID  ID and pulmonary are following  Smoking cessation education  IV fluids, monitor sodium  Vitamin supplements  Discussed with patient regarding test results. Patient is very irritable and gets upset.   Discussed with RN    Case discussed with:  [x]Patient  []Family  []Nursing  []Case Management  DVT Prophylaxis:  []Lovenox  [x]Hep SQ  []SCDs  []Coumadin   []On Heparin gtt    Objective:   VS:   Visit Vitals  /73   Pulse 96   Temp 97.9 °F (36.6 °C)   Resp 20   Ht 6' (1.829 m)   Wt 54.4 kg (120 lb)   SpO2 98%   BMI 16.27 kg/m²      Tmax/24hrs: Temp (24hrs), Av.1 °F (36.7 °C), Min:97.5 °F (36.4 °C), Max:98.7 °F (37.1 °C)    Input/Output:     Intake/Output Summary (Last 24 hours) at 2021 1708  Last data filed at 2021 0907  Gross per 24 hour   Intake 240 ml   Output    Net 240 ml       General:  Awake, alert  Cardiovascular:  S1S2+, RRR  Pulmonary: Coarse breath sounds bilaterally  GI:  Soft, BS+, NT, ND  Extremities:  No edema  Bitemporal wasting    Labs:    Recent Results (from the past 24 hour(s))   LACTIC ACID    Collection Time: 21  8:10 PM   Result Value Ref Range    Lactic acid 1.0 0.4 - 2.0 MMOL/L   URINALYSIS W/ RFLX MICROSCOPIC    Collection Time: 21  3:02 AM   Result Value Ref Range    Color YELLOW      Appearance CLEAR      Specific gravity >1.030 (H) 1.005 - 1.030    pH (UA) 6.5 5.0 - 8.0      Protein Negative NEG mg/dL    Glucose Negative NEG mg/dL    Ketone Negative NEG mg/dL Bilirubin Negative NEG      Blood Negative NEG      Urobilinogen 1.0 0.2 - 1.0 EU/dL    Nitrites Negative NEG      Leukocyte Esterase Negative NEG     COVID-19 RAPID TEST    Collection Time: 06/04/21  3:02 AM   Result Value Ref Range    Specimen source Nasopharyngeal      COVID-19 rapid test Not detected NOTD     SODIUM, UR, RANDOM    Collection Time: 06/04/21  3:02 AM   Result Value Ref Range    Sodium,urine random 33 20 - 110 MMOL/L   LEGIONELLA PNEUMOPHILA AG, URINE    Collection Time: 06/04/21  3:02 AM    Specimen: Urine, random   Result Value Ref Range    Legionella Ag, urine Negative NEG     STREP PNEUMO AG, URINE    Collection Time: 06/04/21  3:02 AM    Specimen: Urine, random   Result Value Ref Range    Strep pneumo Ag, urine Negative NEG     METABOLIC PANEL, BASIC    Collection Time: 06/04/21  3:05 AM   Result Value Ref Range    Sodium 131 (L) 136 - 145 mmol/L    Potassium 3.6 3.5 - 5.5 mmol/L    Chloride 100 100 - 111 mmol/L    CO2 24 21 - 32 mmol/L    Anion gap 7 3.0 - 18 mmol/L    Glucose 73 (L) 74 - 99 mg/dL    BUN 7 7.0 - 18 MG/DL    Creatinine 0.20 (L) 0.6 - 1.3 MG/DL    BUN/Creatinine ratio 35 (H) 12 - 20      GFR est AA >60 >60 ml/min/1.73m2    GFR est non-AA >60 >60 ml/min/1.73m2    Calcium 7.9 (L) 8.5 - 10.1 MG/DL   CBC WITH AUTOMATED DIFF    Collection Time: 06/04/21  3:05 AM   Result Value Ref Range    WBC 8.9 4.6 - 13.2 K/uL    RBC 2.86 (L) 4.35 - 5.65 M/uL    HGB 9.0 (L) 13.0 - 16.0 g/dL    HCT 26.3 (L) 36.0 - 48.0 %    MCV 92.0 74.0 - 97.0 FL    MCH 31.5 24.0 - 34.0 PG    MCHC 34.2 31.0 - 37.0 g/dL    RDW 13.4 11.6 - 14.5 %    PLATELET 187 150 - 331 K/uL    MPV 10.0 9.2 - 11.8 FL    NEUTROPHILS 75 (H) 40 - 73 %    BAND NEUTROPHILS 2 0 - 5 %    LYMPHOCYTES 11 (L) 21 - 52 %    MONOCYTES 8 3 - 10 %    EOSINOPHILS 3 0 - 5 %    BASOPHILS 0 0 - 2 %    MYELOCYTES 1 (H) 0 %    ABS. NEUTROPHILS 6.9 1.8 - 8.0 K/UL    ABS. LYMPHOCYTES 1.0 0.9 - 3.6 K/UL    ABS. MONOCYTES 0.7 0.05 - 1.2 K/UL    ABS. EOSINOPHILS 0.3 0.0 - 0.4 K/UL    ABS.  BASOPHILS 0.0 0.0 - 0.1 K/UL    DF MANUAL      PLATELET COMMENTS ADEQUATE PLATELETS      RBC COMMENTS HERMILA CELLS  1+       GLUCOSE, POC    Collection Time: 06/04/21  6:28 AM   Result Value Ref Range    Glucose (POC) 87 70 - 110 mg/dL     Additional Data Reviewed:      Signed By: Martha Marino MD     June 4, 2021

## 2021-06-05 LAB
ANION GAP SERPL CALC-SCNC: 7 MMOL/L (ref 3–18)
BACTERIA SPEC CULT: NORMAL
BASOPHILS # BLD: 0 K/UL (ref 0–0.1)
BASOPHILS NFR BLD: 0 % (ref 0–2)
BUN SERPL-MCNC: 5 MG/DL (ref 7–18)
BUN/CREAT SERPL: 25 (ref 12–20)
CALCIUM SERPL-MCNC: 7.6 MG/DL (ref 8.5–10.1)
CHLORIDE SERPL-SCNC: 101 MMOL/L (ref 100–111)
CO2 SERPL-SCNC: 22 MMOL/L (ref 21–32)
CREAT SERPL-MCNC: 0.2 MG/DL (ref 0.6–1.3)
DIFFERENTIAL METHOD BLD: ABNORMAL
EOSINOPHIL # BLD: 0.3 K/UL (ref 0–0.4)
EOSINOPHIL NFR BLD: 3 % (ref 0–5)
ERYTHROCYTE [DISTWIDTH] IN BLOOD BY AUTOMATED COUNT: 13.6 % (ref 11.6–14.5)
FOLATE SERPL-MCNC: 7 NG/ML (ref 3.1–17.5)
GLUCOSE SERPL-MCNC: 72 MG/DL (ref 74–99)
HCT VFR BLD AUTO: 23.6 % (ref 36–48)
HGB BLD-MCNC: 8.2 G/DL (ref 13–16)
IRON SATN MFR SERPL: 26 % (ref 20–50)
IRON SERPL-MCNC: 35 UG/DL (ref 50–175)
LYMPHOCYTES # BLD: 1.2 K/UL (ref 0.9–3.6)
LYMPHOCYTES NFR BLD: 14 % (ref 21–52)
MAGNESIUM SERPL-MCNC: 2 MG/DL (ref 1.6–2.6)
MCH RBC QN AUTO: 31.5 PG (ref 24–34)
MCHC RBC AUTO-ENTMCNC: 34.7 G/DL (ref 31–37)
MCV RBC AUTO: 90.8 FL (ref 74–97)
MONOCYTES # BLD: 0.3 K/UL (ref 0.05–1.2)
MONOCYTES NFR BLD: 4 % (ref 3–10)
NEUTS SEG # BLD: 6.9 K/UL (ref 1.8–8)
NEUTS SEG NFR BLD: 79 % (ref 40–73)
OSMOLALITY UR: 560 MOSMOL/KG
PLATELET # BLD AUTO: 349 K/UL (ref 135–420)
PLATELET COMMENTS,PCOM: ABNORMAL
PMV BLD AUTO: 9.9 FL (ref 9.2–11.8)
POTASSIUM SERPL-SCNC: 3.5 MMOL/L (ref 3.5–5.5)
RBC # BLD AUTO: 2.6 M/UL (ref 4.35–5.65)
RBC MORPH BLD: ABNORMAL
SERVICE CMNT-IMP: NORMAL
SERVICE CMNT-IMP: NORMAL
SODIUM SERPL-SCNC: 130 MMOL/L (ref 136–145)
TIBC SERPL-MCNC: 135 UG/DL (ref 250–450)
VIT B12 SERPL-MCNC: 703 PG/ML (ref 211–911)
WBC # BLD AUTO: 8.7 K/UL (ref 4.6–13.2)

## 2021-06-05 PROCEDURE — 83540 ASSAY OF IRON: CPT

## 2021-06-05 PROCEDURE — 2709999900 HC NON-CHARGEABLE SUPPLY

## 2021-06-05 PROCEDURE — 36415 COLL VENOUS BLD VENIPUNCTURE: CPT

## 2021-06-05 PROCEDURE — 99232 SBSQ HOSP IP/OBS MODERATE 35: CPT | Performed by: EMERGENCY MEDICINE

## 2021-06-05 PROCEDURE — 87116 MYCOBACTERIA CULTURE: CPT

## 2021-06-05 PROCEDURE — 85025 COMPLETE CBC W/AUTO DIFF WBC: CPT

## 2021-06-05 PROCEDURE — 74011000250 HC RX REV CODE- 250: Performed by: NURSE PRACTITIONER

## 2021-06-05 PROCEDURE — 74011250636 HC RX REV CODE- 250/636: Performed by: EMERGENCY MEDICINE

## 2021-06-05 PROCEDURE — 83735 ASSAY OF MAGNESIUM: CPT

## 2021-06-05 PROCEDURE — 74011250637 HC RX REV CODE- 250/637: Performed by: NURSE PRACTITIONER

## 2021-06-05 PROCEDURE — 74011250636 HC RX REV CODE- 250/636: Performed by: NURSE PRACTITIONER

## 2021-06-05 PROCEDURE — 65270000029 HC RM PRIVATE

## 2021-06-05 PROCEDURE — 82607 VITAMIN B-12: CPT

## 2021-06-05 PROCEDURE — 74011000258 HC RX REV CODE- 258: Performed by: NURSE PRACTITIONER

## 2021-06-05 PROCEDURE — 74011250637 HC RX REV CODE- 250/637: Performed by: EMERGENCY MEDICINE

## 2021-06-05 PROCEDURE — 94640 AIRWAY INHALATION TREATMENT: CPT

## 2021-06-05 PROCEDURE — 80048 BASIC METABOLIC PNL TOTAL CA: CPT

## 2021-06-05 RX ORDER — LANOLIN ALCOHOL/MO/W.PET/CERES
3 CREAM (GRAM) TOPICAL
Status: DISCONTINUED | OUTPATIENT
Start: 2021-06-05 | End: 2021-06-08 | Stop reason: HOSPADM

## 2021-06-05 RX ADMIN — HEPARIN SODIUM 5000 UNITS: 5000 INJECTION INTRAVENOUS; SUBCUTANEOUS at 04:37

## 2021-06-05 RX ADMIN — HEPARIN SODIUM 5000 UNITS: 5000 INJECTION INTRAVENOUS; SUBCUTANEOUS at 21:28

## 2021-06-05 RX ADMIN — Medication 3 MG: at 21:29

## 2021-06-05 RX ADMIN — GUAIFENESIN 600 MG: 600 TABLET, EXTENDED RELEASE ORAL at 10:50

## 2021-06-05 RX ADMIN — PIPERACILLIN AND TAZOBACTAM 4.5 G: 4; .5 INJECTION, POWDER, FOR SOLUTION INTRAVENOUS at 21:27

## 2021-06-05 RX ADMIN — GUAIFENESIN 600 MG: 600 TABLET, EXTENDED RELEASE ORAL at 21:28

## 2021-06-05 RX ADMIN — Medication 100 MG: at 10:50

## 2021-06-05 RX ADMIN — SODIUM CHLORIDE 60 ML/HR: 900 INJECTION, SOLUTION INTRAVENOUS at 14:23

## 2021-06-05 RX ADMIN — PIPERACILLIN AND TAZOBACTAM 4.5 G: 4; .5 INJECTION, POWDER, FOR SOLUTION INTRAVENOUS at 17:48

## 2021-06-05 RX ADMIN — PIPERACILLIN AND TAZOBACTAM 4.5 G: 4; .5 INJECTION, POWDER, FOR SOLUTION INTRAVENOUS at 10:50

## 2021-06-05 RX ADMIN — THERA TABS 1 TABLET: TAB at 10:50

## 2021-06-05 RX ADMIN — HEPARIN SODIUM 5000 UNITS: 5000 INJECTION INTRAVENOUS; SUBCUTANEOUS at 12:54

## 2021-06-05 RX ADMIN — Medication 10 ML: at 22:47

## 2021-06-05 RX ADMIN — ARFORMOTEROL TARTRATE: 15 SOLUTION RESPIRATORY (INHALATION) at 08:57

## 2021-06-05 RX ADMIN — PIPERACILLIN AND TAZOBACTAM 4.5 G: 4; .5 INJECTION, POWDER, FOR SOLUTION INTRAVENOUS at 02:37

## 2021-06-05 NOTE — PROGRESS NOTES
Problem: Patient Education: Go to Patient Education Activity  Goal: Patient/Family Education  Outcome: Not Progressing Towards Goal     Problem: Pneumonia: Day 1  Goal: Off Pathway (Use only if patient is Off Pathway)  Outcome: Not Progressing Towards Goal  Goal: Activity/Safety  Outcome: Not Progressing Towards Goal  Goal: Consults, if ordered  Outcome: Not Progressing Towards Goal  Goal: Diagnostic Test/Procedures  Outcome: Not Progressing Towards Goal  Goal: Nutrition/Diet  Outcome: Not Progressing Towards Goal  Goal: Medications  Outcome: Not Progressing Towards Goal  Goal: Respiratory  Outcome: Not Progressing Towards Goal  Goal: Treatments/Interventions/Procedures  Outcome: Not Progressing Towards Goal  Goal: Psychosocial  Outcome: Not Progressing Towards Goal  Goal: *Oxygen saturation within defined limits  Outcome: Not Progressing Towards Goal  Goal: *Influenza vaccine administered (October-March)  Outcome: Not Progressing Towards Goal  Goal: *Pneumoccocal vaccine administered  Outcome: Not Progressing Towards Goal  Goal: *Hemodynamically stable  Outcome: Not Progressing Towards Goal  Goal: *Demonstrates progressive activity  Outcome: Not Progressing Towards Goal  Goal: *Tolerating diet  Outcome: Not Progressing Towards Goal     Problem: Pneumonia: Day 2  Goal: Off Pathway (Use only if patient is Off Pathway)  Outcome: Not Progressing Towards Goal  Goal: Activity/Safety  Outcome: Not Progressing Towards Goal  Goal: Consults, if ordered  Outcome: Not Progressing Towards Goal  Goal: Diagnostic Test/Procedures  Outcome: Not Progressing Towards Goal  Goal: Nutrition/Diet  Outcome: Not Progressing Towards Goal  Goal: Discharge Planning  Outcome: Not Progressing Towards Goal  Goal: Medications  Outcome: Not Progressing Towards Goal  Goal: Respiratory  Outcome: Not Progressing Towards Goal  Goal: Treatments/Interventions/Procedures  Outcome: Not Progressing Towards Goal  Goal: Psychosocial  Outcome: Not Progressing Towards Goal  Goal: *Oxygen saturation within defined limits  Outcome: Not Progressing Towards Goal  Goal: *Hemodynamically stable  Outcome: Not Progressing Towards Goal  Goal: *Demonstrates progressive activity  Outcome: Not Progressing Towards Goal  Goal: *Tolerating diet  Outcome: Not Progressing Towards Goal  Goal: *Optimal pain control at patient's stated goal  Outcome: Not Progressing Towards Goal     Problem: Pneumonia: Day 3  Goal: Off Pathway (Use only if patient is Off Pathway)  Outcome: Not Progressing Towards Goal  Goal: Activity/Safety  Outcome: Not Progressing Towards Goal  Goal: Consults, if ordered  Outcome: Not Progressing Towards Goal  Goal: Diagnostic Test/Procedures  Outcome: Not Progressing Towards Goal  Goal: Nutrition/Diet  Outcome: Not Progressing Towards Goal  Goal: Discharge Planning  Outcome: Not Progressing Towards Goal  Goal: Medications  Outcome: Not Progressing Towards Goal  Goal: Respiratory  Outcome: Not Progressing Towards Goal  Goal: Treatments/Interventions/Procedures  Outcome: Not Progressing Towards Goal  Goal: Psychosocial  Outcome: Not Progressing Towards Goal  Goal: *Oxygen saturation within defined limits  Outcome: Not Progressing Towards Goal  Goal: *Hemodynamically stable  Outcome: Not Progressing Towards Goal  Goal: *Demonstrates progressive activity  Outcome: Not Progressing Towards Goal  Goal: *Tolerating diet  Outcome: Not Progressing Towards Goal  Goal: *Describes available resources and support systems  Outcome: Not Progressing Towards Goal  Goal: *Optimal pain control at patient's stated goal  Outcome: Not Progressing Towards Goal     Problem: Pneumonia: Day 4  Goal: Off Pathway (Use only if patient is Off Pathway)  Outcome: Not Progressing Towards Goal  Goal: Activity/Safety  Outcome: Not Progressing Towards Goal  Goal: Nutrition/Diet  Outcome: Not Progressing Towards Goal  Goal: Discharge Planning  Outcome: Not Progressing Towards Goal  Goal: Medications  Outcome: Not Progressing Towards Goal  Goal: Respiratory  Outcome: Not Progressing Towards Goal  Goal: Treatments/Interventions/Procedures  Outcome: Not Progressing Towards Goal  Goal: Psychosocial  Outcome: Not Progressing Towards Goal     Problem: Pneumonia: Discharge Outcomes  Goal: *Demonstrates progressive activity  Outcome: Not Progressing Towards Goal  Goal: *Describes follow-up/return visits to physicians  Outcome: Not Progressing Towards Goal  Goal: *Tolerating diet  Outcome: Not Progressing Towards Goal  Goal: *Verbalizes name, dosage, time, side effects, and number of days to continue medications  Outcome: Not Progressing Towards Goal  Goal: *Influenza immunization  Outcome: Not Progressing Towards Goal  Goal: *Pneumococcal immunization  Outcome: Not Progressing Towards Goal  Goal: *Respiratory status at baseline  Outcome: Not Progressing Towards Goal  Goal: *Vital signs within defined limits  Outcome: Not Progressing Towards Goal  Goal: *Describes available resources and support systems  Outcome: Not Progressing Towards Goal  Goal: *Optimal pain control at patient's stated goal  Outcome: Not Progressing Towards Goal     Problem: Patient Education: Go to Patient Education Activity  Goal: Patient/Family Education  Outcome: Not Progressing Towards Goal

## 2021-06-05 NOTE — PROGRESS NOTES
[facility] Hospitalist Group  Progress Note    Patient: Isadore Hammans Age: 61 y.o. : 1955 MR#: [id number] SSN: [id number]  Date/Time: 2021    Subjective:     Patient is sitting in bed in no apparent distress, awake and alert, follows commands. Patient states that somebody in the ED told him that he will be going home on Monday. I counseled the patient at length that it would depend on his medical condition and further hospital course. Patient does state that his lower extremity weakness is a little better today. Assessment/Plan:     Right upper lobe pneumonia with cavitation  Likely underlying COPD  Tobacco abuse  Hyponatremia  Bilateral lower extremity weakness    Plan  Continue Zosyn, follow cultures  Airborne precautions, AFB smears per ID. I noticed that only 1 AFB smear has been obtained. I have discussed with LUCIEN Michael to obtain AFB smears every 8 hours. ID and pulmonary are following  Smoking cessation education  IV fluids, monitor sodium  Vitamin supplements  I had a lengthy discussion with the patient for more than 25 minutes. I discussed with the patient his various test results and updated him regarding his medical care and further pending tests and work-up. Patient verbalized understanding.   LUCIEN Michael present in room during conversation    Case discussed with:  [x]Patient  []Family  []Nursing  []Case Management  DVT Prophylaxis:  []Lovenox  [x]Hep SQ  []SCDs  []Coumadin   []On Heparin gtt    Objective:   VS:   Visit Vitals  /84 (BP 1 Location: Right upper arm)   Pulse 98   Temp 98.8 °F (37.1 °C)   Resp 20   Ht 6' (1.829 m)   Wt 50.8 kg (112 lb)   SpO2 94%   BMI 15.19 kg/m²      Tmax/24hrs: Temp (24hrs), Av.2 °F (36.8 °C), Min:97.5 °F (36.4 °C), Max:98.8 °F (37.1 °C)    Input/Output:     Intake/Output Summary (Last 24 hours) at 2021 1801  Last data filed at 2021 1255  Gross per 24 hour   Intake    Output 900 ml   Net -900 ml General:  Awake, alert  Cardiovascular:  S1S2+, RRR  Pulmonary: Coarse breath sounds bilaterally  GI:  Soft, BS+, NT, ND  Extremities:  No edema      Labs:    Recent Results (from the past 24 hour(s))   METABOLIC PANEL, BASIC    Collection Time: 06/05/21  3:20 AM   Result Value Ref Range    Sodium 130 (L) 136 - 145 mmol/L    Potassium 3.5 3.5 - 5.5 mmol/L    Chloride 101 100 - 111 mmol/L    CO2 22 21 - 32 mmol/L    Anion gap 7 3.0 - 18 mmol/L    Glucose 72 (L) 74 - 99 mg/dL    BUN 5 (L) 7.0 - 18 MG/DL    Creatinine 0.20 (L) 0.6 - 1.3 MG/DL    BUN/Creatinine ratio 25 (H) 12 - 20      GFR est AA >60 >60 ml/min/1.73m2    GFR est non-AA >60 >60 ml/min/1.73m2    Calcium 7.6 (L) 8.5 - 10.1 MG/DL   CBC WITH AUTOMATED DIFF    Collection Time: 06/05/21  3:20 AM   Result Value Ref Range    WBC 8.7 4.6 - 13.2 K/uL    RBC 2.60 (L) 4.35 - 5.65 M/uL    HGB 8.2 (L) 13.0 - 16.0 g/dL    HCT 23.6 (L) 36.0 - 48.0 %    MCV 90.8 74.0 - 97.0 FL    MCH 31.5 24.0 - 34.0 PG    MCHC 34.7 31.0 - 37.0 g/dL    RDW 13.6 11.6 - 14.5 %    PLATELET 342 738 - 181 K/uL    MPV 9.9 9.2 - 11.8 FL    NEUTROPHILS 79 (H) 40 - 73 %    LYMPHOCYTES 14 (L) 21 - 52 %    MONOCYTES 4 3 - 10 %    EOSINOPHILS 3 0 - 5 %    BASOPHILS 0 0 - 2 %    ABS. NEUTROPHILS 6.9 1.8 - 8.0 K/UL    ABS. LYMPHOCYTES 1.2 0.9 - 3.6 K/UL    ABS. MONOCYTES 0.3 0.05 - 1.2 K/UL    ABS. EOSINOPHILS 0.3 0.0 - 0.4 K/UL    ABS.  BASOPHILS 0.0 0.0 - 0.1 K/UL    DF MANUAL      PLATELET COMMENTS ADEQUATE PLATELETS      RBC COMMENTS NORMOCYTIC, NORMOCHROMIC     MAGNESIUM    Collection Time: 06/05/21  3:20 AM   Result Value Ref Range    Magnesium 2.0 1.6 - 2.6 mg/dL   VITAMIN B12 & FOLATE    Collection Time: 06/05/21  3:20 AM   Result Value Ref Range    Vitamin B12 703 211 - 911 pg/mL    Folate 7.0 3.10 - 17.50 ng/mL   IRON PROFILE    Collection Time: 06/05/21  3:20 AM   Result Value Ref Range    Iron 35 (L) 50 - 175 ug/dL    TIBC 135 (L) 250 - 450 ug/dL    Iron % saturation 26 20 - 50 % Additional Data Reviewed:      Signed By: Erica Abdi MD     June 5, 2021

## 2021-06-05 NOTE — ROUTINE PROCESS
Bedside and Verbal shift change report given to LUCIEN Vaughn (oncoming nurse) by Monica Mcclain (offgoing nurse). Report included the following information SBAR, Kardex and MAR.

## 2021-06-05 NOTE — PROGRESS NOTES
Problem: Patient Education: Go to Patient Education Activity  Goal: Patient/Family Education  Outcome: Progressing Towards Goal     Problem: Pneumonia: Day 1  Goal: Off Pathway (Use only if patient is Off Pathway)  Outcome: Progressing Towards Goal  Goal: Activity/Safety  Outcome: Progressing Towards Goal  Goal: Consults, if ordered  Outcome: Progressing Towards Goal  Goal: Diagnostic Test/Procedures  Outcome: Progressing Towards Goal  Goal: Nutrition/Diet  Outcome: Progressing Towards Goal  Goal: Medications  Outcome: Progressing Towards Goal  Goal: Respiratory  Outcome: Progressing Towards Goal  Goal: Treatments/Interventions/Procedures  Outcome: Progressing Towards Goal  Goal: Psychosocial  Outcome: Progressing Towards Goal  Goal: *Oxygen saturation within defined limits  Outcome: Progressing Towards Goal  Goal: *Influenza vaccine administered (October-March)  Outcome: Progressing Towards Goal  Goal: *Pneumoccocal vaccine administered  Outcome: Progressing Towards Goal  Goal: *Hemodynamically stable  Outcome: Progressing Towards Goal  Goal: *Demonstrates progressive activity  Outcome: Progressing Towards Goal  Goal: *Tolerating diet  Outcome: Progressing Towards Goal     Problem: Pneumonia: Day 2  Goal: Off Pathway (Use only if patient is Off Pathway)  Outcome: Progressing Towards Goal  Goal: Activity/Safety  Outcome: Progressing Towards Goal  Goal: Consults, if ordered  Outcome: Progressing Towards Goal  Goal: Diagnostic Test/Procedures  Outcome: Progressing Towards Goal  Goal: Nutrition/Diet  Outcome: Progressing Towards Goal  Goal: Discharge Planning  Outcome: Progressing Towards Goal  Goal: Medications  Outcome: Progressing Towards Goal  Goal: Respiratory  Outcome: Progressing Towards Goal  Goal: Treatments/Interventions/Procedures  Outcome: Progressing Towards Goal  Goal: Psychosocial  Outcome: Progressing Towards Goal  Goal: *Oxygen saturation within defined limits  Outcome: Progressing Towards Goal  Goal: *Hemodynamically stable  Outcome: Progressing Towards Goal  Goal: *Demonstrates progressive activity  Outcome: Progressing Towards Goal  Goal: *Tolerating diet  Outcome: Progressing Towards Goal  Goal: *Optimal pain control at patient's stated goal  Outcome: Progressing Towards Goal     Problem: Pneumonia: Day 3  Goal: Off Pathway (Use only if patient is Off Pathway)  Outcome: Progressing Towards Goal  Goal: Activity/Safety  Outcome: Progressing Towards Goal  Goal: Consults, if ordered  Outcome: Progressing Towards Goal  Goal: Diagnostic Test/Procedures  Outcome: Progressing Towards Goal  Goal: Nutrition/Diet  Outcome: Progressing Towards Goal  Goal: Discharge Planning  Outcome: Progressing Towards Goal  Goal: Medications  Outcome: Progressing Towards Goal  Goal: Respiratory  Outcome: Progressing Towards Goal  Goal: Treatments/Interventions/Procedures  Outcome: Progressing Towards Goal  Goal: Psychosocial  Outcome: Progressing Towards Goal  Goal: *Oxygen saturation within defined limits  Outcome: Progressing Towards Goal  Goal: *Hemodynamically stable  Outcome: Progressing Towards Goal  Goal: *Demonstrates progressive activity  Outcome: Progressing Towards Goal  Goal: *Tolerating diet  Outcome: Progressing Towards Goal  Goal: *Describes available resources and support systems  Outcome: Progressing Towards Goal  Goal: *Optimal pain control at patient's stated goal  Outcome: Progressing Towards Goal     Problem: Pneumonia: Day 4  Goal: Off Pathway (Use only if patient is Off Pathway)  Outcome: Progressing Towards Goal  Goal: Activity/Safety  Outcome: Progressing Towards Goal  Goal: Nutrition/Diet  Outcome: Progressing Towards Goal  Goal: Discharge Planning  Outcome: Progressing Towards Goal  Goal: Medications  Outcome: Progressing Towards Goal  Goal: Respiratory  Outcome: Progressing Towards Goal  Goal: Treatments/Interventions/Procedures  Outcome: Progressing Towards Goal  Goal: Psychosocial  Outcome: Progressing Towards Goal     Problem: Pneumonia: Discharge Outcomes  Goal: *Demonstrates progressive activity  Outcome: Progressing Towards Goal  Goal: *Describes follow-up/return visits to physicians  Outcome: Progressing Towards Goal  Goal: *Tolerating diet  Outcome: Progressing Towards Goal  Goal: *Verbalizes name, dosage, time, side effects, and number of days to continue medications  Outcome: Progressing Towards Goal  Goal: *Influenza immunization  Outcome: Progressing Towards Goal  Goal: *Pneumococcal immunization  Outcome: Progressing Towards Goal  Goal: *Respiratory status at baseline  Outcome: Progressing Towards Goal  Goal: *Vital signs within defined limits  Outcome: Progressing Towards Goal  Goal: *Describes available resources and support systems  Outcome: Progressing Towards Goal  Goal: *Optimal pain control at patient's stated goal  Outcome: Progressing Towards Goal     Problem: Patient Education: Go to Patient Education Activity  Goal: Patient/Family Education  Outcome: Progressing Towards Goal     Problem: Patient Education: Go to Patient Education Activity  Goal: Patient/Family Education  Outcome: Progressing Towards Goal

## 2021-06-06 LAB
ANION GAP SERPL CALC-SCNC: 5 MMOL/L (ref 3–18)
BACTERIA SPEC CULT: NORMAL
BASOPHILS # BLD: 0.1 K/UL (ref 0–0.1)
BASOPHILS NFR BLD: 1 % (ref 0–2)
BUN SERPL-MCNC: 5 MG/DL (ref 7–18)
BUN/CREAT SERPL: 23 (ref 12–20)
CALCIUM SERPL-MCNC: 7.9 MG/DL (ref 8.5–10.1)
CHLORIDE SERPL-SCNC: 103 MMOL/L (ref 100–111)
CO2 SERPL-SCNC: 23 MMOL/L (ref 21–32)
CREAT SERPL-MCNC: 0.22 MG/DL (ref 0.6–1.3)
DIFFERENTIAL METHOD BLD: ABNORMAL
EOSINOPHIL # BLD: 0.2 K/UL (ref 0–0.4)
EOSINOPHIL NFR BLD: 2 % (ref 0–5)
ERYTHROCYTE [DISTWIDTH] IN BLOOD BY AUTOMATED COUNT: 13.7 % (ref 11.6–14.5)
GLUCOSE SERPL-MCNC: 79 MG/DL (ref 74–99)
GRAM STN SPEC: NORMAL
HCT VFR BLD AUTO: 24.2 % (ref 36–48)
HGB BLD-MCNC: 8 G/DL (ref 13–16)
LYMPHOCYTES # BLD: 1.5 K/UL (ref 0.9–3.6)
LYMPHOCYTES NFR BLD: 20 % (ref 21–52)
MCH RBC QN AUTO: 30.4 PG (ref 24–34)
MCHC RBC AUTO-ENTMCNC: 33.1 G/DL (ref 31–37)
MCV RBC AUTO: 92 FL (ref 74–97)
MONOCYTES # BLD: 0.2 K/UL (ref 0.05–1.2)
MONOCYTES NFR BLD: 3 % (ref 3–10)
NEUTS SEG # BLD: 5.7 K/UL (ref 1.8–8)
NEUTS SEG NFR BLD: 74 % (ref 40–73)
PLATELET # BLD AUTO: 355 K/UL (ref 135–420)
PLATELET COMMENTS,PCOM: ABNORMAL
PMV BLD AUTO: 9.8 FL (ref 9.2–11.8)
POTASSIUM SERPL-SCNC: 3.7 MMOL/L (ref 3.5–5.5)
RBC # BLD AUTO: 2.63 M/UL (ref 4.35–5.65)
RBC MORPH BLD: ABNORMAL
SERVICE CMNT-IMP: NORMAL
SODIUM SERPL-SCNC: 131 MMOL/L (ref 136–145)
WBC # BLD AUTO: 7.7 K/UL (ref 4.6–13.2)

## 2021-06-06 PROCEDURE — 85025 COMPLETE CBC W/AUTO DIFF WBC: CPT

## 2021-06-06 PROCEDURE — 36415 COLL VENOUS BLD VENIPUNCTURE: CPT

## 2021-06-06 PROCEDURE — 74011250637 HC RX REV CODE- 250/637: Performed by: EMERGENCY MEDICINE

## 2021-06-06 PROCEDURE — 99232 SBSQ HOSP IP/OBS MODERATE 35: CPT | Performed by: EMERGENCY MEDICINE

## 2021-06-06 PROCEDURE — 74011250636 HC RX REV CODE- 250/636: Performed by: NURSE PRACTITIONER

## 2021-06-06 PROCEDURE — 65270000029 HC RM PRIVATE

## 2021-06-06 PROCEDURE — 80048 BASIC METABOLIC PNL TOTAL CA: CPT

## 2021-06-06 PROCEDURE — 74011250636 HC RX REV CODE- 250/636: Performed by: EMERGENCY MEDICINE

## 2021-06-06 PROCEDURE — 2709999900 HC NON-CHARGEABLE SUPPLY

## 2021-06-06 PROCEDURE — 87116 MYCOBACTERIA CULTURE: CPT

## 2021-06-06 PROCEDURE — 74011000258 HC RX REV CODE- 258: Performed by: NURSE PRACTITIONER

## 2021-06-06 PROCEDURE — 99233 SBSQ HOSP IP/OBS HIGH 50: CPT | Performed by: INTERNAL MEDICINE

## 2021-06-06 PROCEDURE — 74011250637 HC RX REV CODE- 250/637: Performed by: NURSE PRACTITIONER

## 2021-06-06 RX ORDER — HEPARIN SODIUM 5000 [USP'U]/ML
5000 INJECTION, SOLUTION INTRAVENOUS; SUBCUTANEOUS EVERY 8 HOURS
Status: DISCONTINUED | OUTPATIENT
Start: 2021-06-06 | End: 2021-06-08 | Stop reason: HOSPADM

## 2021-06-06 RX ADMIN — HEPARIN SODIUM 5000 UNITS: 5000 INJECTION INTRAVENOUS; SUBCUTANEOUS at 10:25

## 2021-06-06 RX ADMIN — PIPERACILLIN AND TAZOBACTAM 4.5 G: 4; .5 INJECTION, POWDER, FOR SOLUTION INTRAVENOUS at 10:24

## 2021-06-06 RX ADMIN — PIPERACILLIN AND TAZOBACTAM 4.5 G: 4; .5 INJECTION, POWDER, FOR SOLUTION INTRAVENOUS at 22:35

## 2021-06-06 RX ADMIN — PIPERACILLIN AND TAZOBACTAM 4.5 G: 4; .5 INJECTION, POWDER, FOR SOLUTION INTRAVENOUS at 03:25

## 2021-06-06 RX ADMIN — PIPERACILLIN AND TAZOBACTAM 4.5 G: 4; .5 INJECTION, POWDER, FOR SOLUTION INTRAVENOUS at 16:46

## 2021-06-06 RX ADMIN — Medication 10 ML: at 06:52

## 2021-06-06 RX ADMIN — SODIUM CHLORIDE 60 ML/HR: 900 INJECTION, SOLUTION INTRAVENOUS at 10:24

## 2021-06-06 RX ADMIN — Medication 10 ML: at 22:21

## 2021-06-06 RX ADMIN — HEPARIN SODIUM 5000 UNITS: 5000 INJECTION INTRAVENOUS; SUBCUTANEOUS at 16:45

## 2021-06-06 RX ADMIN — THERA TABS 1 TABLET: TAB at 10:25

## 2021-06-06 RX ADMIN — GUAIFENESIN 600 MG: 600 TABLET, EXTENDED RELEASE ORAL at 22:18

## 2021-06-06 RX ADMIN — GUAIFENESIN 600 MG: 600 TABLET, EXTENDED RELEASE ORAL at 10:24

## 2021-06-06 RX ADMIN — Medication 10 ML: at 16:46

## 2021-06-06 RX ADMIN — Medication 100 MG: at 10:24

## 2021-06-06 NOTE — PROGRESS NOTES
[facility] Hospitalist Group  Progress Note    Patient: Blake Jimenez Age: 61 y.o. : 1955 MR#: [id number] [id number]  Date/Time: 2021    Subjective:     Patient is sitting in bed in no apparent distress. Denies any shortness of breath at rest.  Still has cough    Assessment/Plan:     Right upper lobe pneumonia with cavitation  Likely underlying COPD  Tobacco abuse  Hyponatremia  Bilateral lower extremity weaknessimproving    Plan  On Zosyn  Continue airborne precautions.   AFB smears pending  Discussed with pulmonary  ID and pulmonary are following  Smoking cessation education  IV fluids, monitor sodium  PT and OT  Vitamin supplements  Discussed with patient, discussed with RN    Case discussed with:  [x]Patient  []Family  []Nursing  []Case Management  DVT Prophylaxis:  []Lovenox  [x]Hep SQ  []SCDs  []Coumadin   []On Heparin gtt    Objective:   VS:   Visit Vitals  /78 (BP 1 Location: Left upper arm, BP Patient Position: Lying)   Pulse 93   Temp 98.2 °F (36.8 °C)   Resp 20   Ht 6' (1.829 m)   Wt 50.8 kg (112 lb)   SpO2 94%   BMI 15.19 kg/m²      Tmax/24hrs: Temp (24hrs), Av.3 °F (36.8 °C), Min:97.4 °F (36.3 °C), Max:99.2 °F (37.3 °C)    Input/Output:     Intake/Output Summary (Last 24 hours) at 2021 1524  Last data filed at 2021 1057  Gross per 24 hour   Intake 1537 ml   Output 1000 ml   Net 537 ml       General:  Awake, alert  Cardiovascular:  S1S2+, RRR  Pulmonary: Coarse breath sounds without  GI:  Soft, BS+, NT, ND  Extremities:  No edema  Patient is able to move all 4 extremities      Labs:    Recent Results (from the past 24 hour(s))   METABOLIC PANEL, BASIC    Collection Time: 21  2:44 AM   Result Value Ref Range    Sodium 131 (L) 136 - 145 mmol/L    Potassium 3.7 3.5 - 5.5 mmol/L    Chloride 103 100 - 111 mmol/L    CO2 23 21 - 32 mmol/L    Anion gap 5 3.0 - 18 mmol/L    Glucose 79 74 - 99 mg/dL    BUN 5 (L) 7.0 - 18 MG/DL Creatinine 0.22 (L) 0.6 - 1.3 MG/DL    BUN/Creatinine ratio 23 (H) 12 - 20      GFR est AA >60 >60 ml/min/1.73m2    GFR est non-AA >60 >60 ml/min/1.73m2    Calcium 7.9 (L) 8.5 - 10.1 MG/DL   CBC WITH AUTOMATED DIFF    Collection Time: 06/06/21  2:44 AM   Result Value Ref Range    WBC 7.7 4.6 - 13.2 K/uL    RBC 2.63 (L) 4.35 - 5.65 M/uL    HGB 8.0 (L) 13.0 - 16.0 g/dL    HCT 24.2 (L) 36.0 - 48.0 %    MCV 92.0 74.0 - 97.0 FL    MCH 30.4 24.0 - 34.0 PG    MCHC 33.1 31.0 - 37.0 g/dL    RDW 13.7 11.6 - 14.5 %    PLATELET 555 512 - 350 K/uL    MPV 9.8 9.2 - 11.8 FL    NEUTROPHILS 74 (H) 40 - 73 %    LYMPHOCYTES 20 (L) 21 - 52 %    MONOCYTES 3 3 - 10 %    EOSINOPHILS 2 0 - 5 %    BASOPHILS 1 0 - 2 %    ABS. NEUTROPHILS 5.7 1.8 - 8.0 K/UL    ABS. LYMPHOCYTES 1.5 0.9 - 3.6 K/UL    ABS. MONOCYTES 0.2 0.05 - 1.2 K/UL    ABS. EOSINOPHILS 0.2 0.0 - 0.4 K/UL    ABS.  BASOPHILS 0.1 0.0 - 0.1 K/UL    DF MANUAL      PLATELET COMMENTS ADEQUATE PLATELETS      RBC COMMENTS NORMOCYTIC, NORMOCHROMIC       Additional Data Reviewed:      Signed By: Obinna Reynolds MD     June 6, 2021

## 2021-06-06 NOTE — PROGRESS NOTES
[facility]  Pulmonary, Critical Care, and Sleep Medicine    Pulmonary Medicine Progress Note    Name: Jonathan Lin MRN: [id number]  : 1955 Hospital: [facility]  Date: 2021       Subjective:  Pt very upset he is being \"held hostage\". His room is extremely hot, will notify staff. Overall his breathing is stable. No cough currently. Patient Active Problem List   Diagnosis Code    GERD (gastroesophageal reflux disease) K21.9    Sinusitis J32.9    ED (erectile dysfunction) N52.9    DJD (degenerative joint disease) of knee M17.10    Fall from standing W19. Solomon Ortiz    Rib pain on left side R07.81    Pneumonia J18.9       Assessment:  · RUL Cavitary pneumonia  · - likely chronic necrotizing infection, possible malignancy  · - No TB risk factors, EtOH abuse makes risk of anaerobic organisms. · Hyponatremia  · - chronic, likely SIADH 2/2 lung infection vs. Malignancy vs. Malnutrition  · Probable COPD  · - 40+ pack year smoker, emphysema on CT  · - on home albuterol  · B/L Leg weakness  · Weight loss  · Tobacco Abuse  · - long term smoker >40 pack years    Impression/Plan:  · RUL lesions likely represent a chronic infection- anaerobic vs. Fungal vs. AFB, but malignancy is also possible, especially in a high risk patient like him. Would treat with empiric broad spectrum ABX with anaerobic coverage for now  · AFB neg x1, other 2 are collected  · ABX per ID  · If AFB neg, he would likely benefit from a bronchoscopy for diagnostic sampling +/- biopsy. Will attempt to set this up tomorrow if AFB negative. · Continue nebulizers, prn duonebs  · No need for steroids  · Bronchial hygiene protocol  · Out patient testing- PFT  · Assess home Oxygen needs at discharge  · OT, PT, OOB and ambulate    Will follow    FiO2 to keep SpO2 >=92%, HOB >=30 degree, aspiration precautions, aggressive pulmonary toileting, incentive spirometry.     Other issues management by primary team and respective consultants. Events and notes from last 24 hours reviewed. Discussed with patient and family, answered all questions to their satisfaction. Care plan discussed with nursing.      Labs and images personally seen and available reports reviewed  All current medicines are reviewed       Medications- Current:  Current Facility-Administered Medications   Medication Dose Route Frequency    heparin (porcine) injection 5,000 Units  5,000 Units SubCUTAneous Q8H    melatonin tablet 3 mg  3 mg Oral QHS    thiamine HCL (B-1) tablet 100 mg  100 mg Oral DAILY    nicotine (NICODERM CQ) 14 mg/24 hr patch 1 Patch  1 Patch TransDERmal DAILY    sodium chloride (NS) flush 5-10 mL  5-10 mL IntraVENous PRN    piperacillin-tazobactam (ZOSYN) 4.5 g in 0.9% sodium chloride (MBP/ADV) 100 mL MBP  4.5 g IntraVENous Q6H    ondansetron (ZOFRAN) injection 4 mg  4 mg IntraVENous Q4H PRN    acetaminophen (TYLENOL) tablet 650 mg  650 mg Oral Q6H PRN    Or    acetaminophen (TYLENOL) suppository 650 mg  650 mg Rectal Q6H PRN    polyethylene glycol (MIRALAX) packet 17 g  17 g Oral DAILY PRN    ondansetron (ZOFRAN ODT) tablet 4 mg  4 mg Oral Q8H PRN    sodium chloride (NS) flush 5-40 mL  5-40 mL IntraVENous Q8H    therapeutic multivitamin (THERAGRAN) tablet 1 Tablet  1 Tablet Oral DAILY    arformoterol 15 mcg/budesonide 0.5 mg neb solution   Nebulization BID RT    guaiFENesin ER (MUCINEX) tablet 600 mg  600 mg Oral Q12H       Objective:  Vital Signs:    Visit Vitals  /78 (BP 1 Location: Left upper arm, BP Patient Position: Lying)   Pulse 93   Temp 98.2 °F (36.8 °C)   Resp 20   Ht 6' (1.829 m)   Wt 50.8 kg (112 lb)   SpO2 94%   BMI 15.19 kg/m²      O2 Device: None (Room air)     Temp (24hrs), Av.2 °F (36.8 °C), Min:97.4 °F (36.3 °C), Max:99.2 °F (37.3 °C)      Intake/Output:   Last shift:       0701 -  1900  In: -   Out: 300 [Urine:300]  Last 3 shifts: 1901 -  0700  In: 0593 [P.O.:240; I.V.:2805]  Out: 1600 [Urine:1600]    Intake/Output Summary (Last 24 hours) at 6/6/2021 1632  Last data filed at 6/6/2021 1057  Gross per 24 hour   Intake 1537 ml   Output 1000 ml   Net 537 ml       Physical Exam:     General/Neurology: Alert, Awake, very thin  Head:   Normocephalic, without obvious abnormality  Eye:   PERRL, EOM intact  Oral:  Mucus membranes moist  Lung:   B/l air entry fair. No rales. No rhonchi. No wheezing, decrease RUL sounds. Heart:   S1 S2 present  Abdomen:  Soft, non tender, BS+nt   Extremities:  No pedal edema. Skin:   Dry, intact  Data:      Recent Results (from the past 24 hour(s))   METABOLIC PANEL, BASIC    Collection Time: 06/06/21  2:44 AM   Result Value Ref Range    Sodium 131 (L) 136 - 145 mmol/L    Potassium 3.7 3.5 - 5.5 mmol/L    Chloride 103 100 - 111 mmol/L    CO2 23 21 - 32 mmol/L    Anion gap 5 3.0 - 18 mmol/L    Glucose 79 74 - 99 mg/dL    BUN 5 (L) 7.0 - 18 MG/DL    Creatinine 0.22 (L) 0.6 - 1.3 MG/DL    BUN/Creatinine ratio 23 (H) 12 - 20      GFR est AA >60 >60 ml/min/1.73m2    GFR est non-AA >60 >60 ml/min/1.73m2    Calcium 7.9 (L) 8.5 - 10.1 MG/DL   CBC WITH AUTOMATED DIFF    Collection Time: 06/06/21  2:44 AM   Result Value Ref Range    WBC 7.7 4.6 - 13.2 K/uL    RBC 2.63 (L) 4.35 - 5.65 M/uL    HGB 8.0 (L) 13.0 - 16.0 g/dL    HCT 24.2 (L) 36.0 - 48.0 %    MCV 92.0 74.0 - 97.0 FL    MCH 30.4 24.0 - 34.0 PG    MCHC 33.1 31.0 - 37.0 g/dL    RDW 13.7 11.6 - 14.5 %    PLATELET 149 490 - 004 K/uL    MPV 9.8 9.2 - 11.8 FL    NEUTROPHILS 74 (H) 40 - 73 %    LYMPHOCYTES 20 (L) 21 - 52 %    MONOCYTES 3 3 - 10 %    EOSINOPHILS 2 0 - 5 %    BASOPHILS 1 0 - 2 %    ABS. NEUTROPHILS 5.7 1.8 - 8.0 K/UL    ABS. LYMPHOCYTES 1.5 0.9 - 3.6 K/UL    ABS. MONOCYTES 0.2 0.05 - 1.2 K/UL    ABS. EOSINOPHILS 0.2 0.0 - 0.4 K/UL    ABS.  BASOPHILS 0.1 0.0 - 0.1 K/UL    DF MANUAL      PLATELET COMMENTS ADEQUATE PLATELETS      RBC COMMENTS NORMOCYTIC, NORMOCHROMIC Chemistry   Recent Labs     06/06/21  0244 06/05/21  0320 06/04/21  0305   GLU 79 72* 73*   * 130* 131*   K 3.7 3.5 3.6    101 100   CO2 23 22 24   BUN 5* 5* 7   CREA 0.22* 0.20* 0.20*   CA 7.9* 7.6* 7.9*   MG  --  2.0  --    AGAP 5 7 7   BUCR 23* 25* 35*       CBC w/Diff   Recent Labs     06/06/21  0244 06/05/21  0320 06/04/21  0305   WBC 7.7 8.7 8.9   RBC 2.63* 2.60* 2.86*   HGB 8.0* 8.2* 9.0*   HCT 24.2* 23.6* 26.3*    349 343   GRANS 74* 79* 75*   LYMPH 20* 14* 11*   EOS 2 3 3       ABG No results for input(s): PHI, PHI, POC2, PCO2I, PO2, PO2I, HCO3, HCO3I, FIO2, FIO2I in the last 72 hours. Micro    Recent Labs     06/04/21  1308 06/04/21  1300 06/04/21  0302   CULT FEW NORMAL RESPIRATORY LE MRSA NOT PRESENT  Screening of patient nares for MRSA is for surveillance purposes and, if positive, to facilitate isolation considerations in high risk settings. It is not intended for automatic decolonization interventions per se as regimens are not sufficiently effective to warrant routine use. No growth (<1,000 CFU/ML)     Recent Labs     06/04/21  1308 06/04/21  1300 06/04/21  0302   CULT FEW NORMAL RESPIRATORY LE MRSA NOT PRESENT  Screening of patient nares for MRSA is for surveillance purposes and, if positive, to facilitate isolation considerations in high risk settings. It is not intended for automatic decolonization interventions per se as regimens are not sufficiently effective to warrant routine use. No growth (<1,000 CFU/ML)       CT (Most Recent) Results from Hospital Encounter encounter on 06/03/21    CTA CHEST W OR W WO CONT    Narrative  EXAM:  CTA Chest with Contrast (Study for PE). CLINICAL INDICATION:  Shortness of breath. COMPARISON:  CT 08/14/20, chest x-ray 06/30/20. TECHNIQUE:    - Helical volumetric sections of the chest are obtained with CT pulmonary  angiogram protocol. Subsequently, sagittal and coronal multiplanar  reconstruction images are obtained. Maximum intensity projection images are  generated to better delineate the pulmonary vasculature, differentiate between  the pulmonary arteries and veins and to increase sensitivity to pulmonary  emboli.  - IV contrast dose of 100 mL Isovue-370.  - Radiation dose optimization techniques are utilized as appropriate to the  exam, with combination of automated exposure control, adjustment of the mA  and/or kV according to patient's size (Including appropriate matching for  site-specific examinations), or use of iterative reconstruction technique. FINDINGS:    Pulmonary Arteries:  No convincing evidence of pulmonary embolism is detected  bilaterally. Lung, Pleura, Airways:    - Right upper lobe consolidation with interspersed air pockets. The largest air  pocket and the right upper lobe measures up to about 4.6 x 2.9 cm (axial #16). - Right lower lobe patchy airspace opacities are noted in the posterior medial  aspect. - Emphysematous changes are noted bilaterally. - Atelectatic changes related to large hiatal hernia. - Multiple bullae/ blebs. Mediastinum:  Enlarged subcarinal node measuring about 1.7 x 1.3 cm (axial  #118). Right hilar node measuring about 1.2 x 0.9 cm. Precarinal node  measuring about 2.0 x 1.7 cm (axial #107). Aorta:  No evidence of aortic dissection or aneurysm. Base of Neck:  No acute findings. Chest Wall, Axillae:  Unremarkable. Upper Abdomen:  No acute abnormalities. Skeletal Structures:  Anterior wedging deformity at T3, T11 and T12. Similar to  08/14/20. Impression  1. No convincing evidence of pulmonary embolism. 2.  Consolidation in the right upper lobe with multiple smaller pockets. At  least one of the pockets appear to be fairly prominent. Developing aggressive  infectious process is suspected. 3.  Enlarged mediastinal nodes. 4.  Chronic anterior wedging deformities at T3, T11 and T12. XR (Most Recent).  CXR reviewed by me and compared with previous CXR Results from [place] encounter on 06/03/21    XR SPINE LUMB 2 OR 3 V    Narrative  EXAM: XR SPINE LUMB 2 OR 3 V    INDICATION: BLE weakness    COMPARISON: 6/30/2020. FINDINGS: AP, lateral and spot lateral views of the lumbar spine. Limited evaluation due to osteopenia and overlying bowel gas. There is superior  endplate anterior wedging with mild compression deformity of L3 and L4 vertebral  bodies similar to comparison. Also chronic compression fracture deformity of T12  vertebral body. Remainder of the vertebral body heights are maintained. There is  5 mm retrolisthesis L5 on S1. Disc heights are maintained. Or to iliac  atherosclerosis. Impression  Osteopenia. Chronic compression fracture deformities at T12, L3 and L4. No acute  displaced fracture. If high clinical suspicion for acute fracture recommend  further evaluation with CT or MR. See my orders for details     Total care time exclusive of procedures with complex decision making, coordination of care and counseling patient performed and > 50% time spent in face to face evaluation as mentioned above.     Jose Mensah MD  Critical Care Medicine

## 2021-06-06 NOTE — ROUTINE PROCESS
Bedside shift change report given to [address] Laurence Hartley (oncoming nurse) by Paula Irwin (offgoing nurse). Report included the following information SBAR, Kardex, Intake/Output and Recent Results.

## 2021-06-06 NOTE — PROGRESS NOTES
Problem: Patient Education: Go to Patient Education Activity  Goal: Patient/Family Education  Outcome: Progressing Towards Goal     Problem: Pneumonia: Day 1  Goal: Off Pathway (Use only if patient is Off Pathway)  Outcome: Progressing Towards Goal  Goal: Activity/Safety  Outcome: Progressing Towards Goal  Goal: Consults, if ordered  Outcome: Progressing Towards Goal  Goal: Diagnostic Test/Procedures  Outcome: Progressing Towards Goal  Goal: Nutrition/Diet  Outcome: Progressing Towards Goal  Goal: Medications  Outcome: Progressing Towards Goal  Goal: Respiratory  Outcome: Progressing Towards Goal  Goal: Treatments/Interventions/Procedures  Outcome: Progressing Towards Goal  Goal: Psychosocial  Outcome: Progressing Towards Goal  Goal: *Oxygen saturation within defined limits  Outcome: Progressing Towards Goal  Goal: *Influenza vaccine administered (October-March)  Outcome: Progressing Towards Goal  Goal: *Pneumoccocal vaccine administered  Outcome: Progressing Towards Goal  Goal: *Hemodynamically stable  Outcome: Progressing Towards Goal  Goal: *Demonstrates progressive activity  Outcome: Progressing Towards Goal  Goal: *Tolerating diet  Outcome: Progressing Towards Goal     Problem: Pneumonia: Day 2  Goal: Off Pathway (Use only if patient is Off Pathway)  Outcome: Progressing Towards Goal  Goal: Activity/Safety  Outcome: Progressing Towards Goal  Goal: Consults, if ordered  Outcome: Progressing Towards Goal  Goal: Diagnostic Test/Procedures  Outcome: Progressing Towards Goal  Goal: Nutrition/Diet  Outcome: Progressing Towards Goal  Goal: Discharge Planning  Outcome: Progressing Towards Goal  Goal: Medications  Outcome: Progressing Towards Goal  Goal: Respiratory  Outcome: Progressing Towards Goal  Goal: Treatments/Interventions/Procedures  Outcome: Progressing Towards Goal  Goal: Psychosocial  Outcome: Progressing Towards Goal  Goal: *Oxygen saturation within defined limits  Outcome: Progressing Towards Goal  Goal: *Hemodynamically stable  Outcome: Progressing Towards Goal  Goal: *Demonstrates progressive activity  Outcome: Progressing Towards Goal  Goal: *Tolerating diet  Outcome: Progressing Towards Goal  Goal: *Optimal pain control at patient's stated goal  Outcome: Progressing Towards Goal     Problem: Pneumonia: Day 3  Goal: Off Pathway (Use only if patient is Off Pathway)  Outcome: Progressing Towards Goal  Goal: Activity/Safety  Outcome: Progressing Towards Goal  Goal: Consults, if ordered  Outcome: Progressing Towards Goal  Goal: Diagnostic Test/Procedures  Outcome: Progressing Towards Goal  Goal: Nutrition/Diet  Outcome: Progressing Towards Goal  Goal: Discharge Planning  Outcome: Progressing Towards Goal  Goal: Medications  Outcome: Progressing Towards Goal  Goal: Respiratory  Outcome: Progressing Towards Goal  Goal: Treatments/Interventions/Procedures  Outcome: Progressing Towards Goal  Goal: Psychosocial  Outcome: Progressing Towards Goal  Goal: *Oxygen saturation within defined limits  Outcome: Progressing Towards Goal  Goal: *Hemodynamically stable  Outcome: Progressing Towards Goal  Goal: *Demonstrates progressive activity  Outcome: Progressing Towards Goal  Goal: *Tolerating diet  Outcome: Progressing Towards Goal  Goal: *Describes available resources and support systems  Outcome: Progressing Towards Goal  Goal: *Optimal pain control at patient's stated goal  Outcome: Progressing Towards Goal     Problem: Pneumonia: Day 4  Goal: Off Pathway (Use only if patient is Off Pathway)  Outcome: Progressing Towards Goal  Goal: Activity/Safety  Outcome: Progressing Towards Goal  Goal: Nutrition/Diet  Outcome: Progressing Towards Goal  Goal: Discharge Planning  Outcome: Progressing Towards Goal  Goal: Medications  Outcome: Progressing Towards Goal  Goal: Respiratory  Outcome: Progressing Towards Goal  Goal: Treatments/Interventions/Procedures  Outcome: Progressing Towards Goal  Goal: Psychosocial  Outcome: Progressing Towards Goal     Problem: Pneumonia: Discharge Outcomes  Goal: *Demonstrates progressive activity  Outcome: Progressing Towards Goal  Goal: *Describes follow-up/return visits to physicians  Outcome: Progressing Towards Goal  Goal: *Tolerating diet  Outcome: Progressing Towards Goal  Goal: *Verbalizes name, dosage, time, side effects, and number of days to continue medications  Outcome: Progressing Towards Goal  Goal: *Influenza immunization  Outcome: Progressing Towards Goal  Goal: *Pneumococcal immunization  Outcome: Progressing Towards Goal  Goal: *Respiratory status at baseline  Outcome: Progressing Towards Goal  Goal: *Vital signs within defined limits  Outcome: Progressing Towards Goal  Goal: *Describes available resources and support systems  Outcome: Progressing Towards Goal  Goal: *Optimal pain control at patient's stated goal  Outcome: Progressing Towards Goal     Problem: Patient Education: Go to Patient Education Activity  Goal: Patient/Family Education  Outcome: Progressing Towards Goal     Problem: Patient Education: Go to Patient Education Activity  Goal: Patient/Family Education  Outcome: Progressing Towards Goal     Problem: Nutrition Deficit  Goal: *Optimize nutritional status  Outcome: Progressing Towards Goal     Problem: Risk for Spread of Infection  Goal: Prevent transmission of infectious organism to others  Description: Prevent the transmission of infectious organisms to other patients, staff members, and visitors. Outcome: Progressing Towards Goal     Problem: Patient Education:  Go to Education Activity  Goal: Patient/Family Education  Outcome: Progressing Towards Goal     Problem: Falls - Risk of  Goal: *Absence of Falls  Description: Document Ryan Hester Fall Risk and appropriate interventions in the flowsheet.   Outcome: Progressing Towards Goal  Note: Fall Risk Interventions:            Medication Interventions: Patient to call before getting OOB, Teach patient to arise slowly    Elimination Interventions: Patient to call for help with toileting needs, Call light in reach, Bed/chair exit alarm, Urinal in reach    History of Falls Interventions: Bed/chair exit alarm, Door open when patient unattended, Room close to nurse's station         Problem: Pain  Goal: *Control of Pain  Outcome: Progressing Towards Goal     Problem: Patient Education: Go to Patient Education Activity  Goal: Patient/Family Education  Outcome: Progressing Towards Goal

## 2021-06-06 NOTE — ROUTINE PROCESS
Bedside and Verbal shift change report given to LUCIEN Vaughn (oncoming nurse) by Ramesh Aguiar RN (offgoing nurse). Report included the following information SBAR, Kardex and MAR.

## 2021-06-06 NOTE — PROGRESS NOTES
Problem: Patient Education: Go to Patient Education Activity  Goal: Patient/Family Education  Outcome: Progressing Towards Goal     Problem: Pneumonia: Day 1  Goal: Off Pathway (Use only if patient is Off Pathway)  Outcome: Progressing Towards Goal  Goal: Activity/Safety  Outcome: Progressing Towards Goal  Goal: Consults, if ordered  Outcome: Progressing Towards Goal  Goal: Diagnostic Test/Procedures  Outcome: Progressing Towards Goal  Goal: Nutrition/Diet  Outcome: Progressing Towards Goal  Goal: Medications  Outcome: Progressing Towards Goal  Goal: Respiratory  Outcome: Progressing Towards Goal  Goal: Treatments/Interventions/Procedures  Outcome: Progressing Towards Goal  Goal: Psychosocial  Outcome: Progressing Towards Goal  Goal: *Oxygen saturation within defined limits  Outcome: Progressing Towards Goal  Goal: *Influenza vaccine administered (October-March)  Outcome: Progressing Towards Goal  Goal: *Pneumoccocal vaccine administered  Outcome: Progressing Towards Goal  Goal: *Hemodynamically stable  Outcome: Progressing Towards Goal  Goal: *Demonstrates progressive activity  Outcome: Progressing Towards Goal  Goal: *Tolerating diet  Outcome: Progressing Towards Goal     Problem: Pneumonia: Day 2  Goal: Off Pathway (Use only if patient is Off Pathway)  Outcome: Progressing Towards Goal  Goal: Activity/Safety  Outcome: Progressing Towards Goal  Goal: Consults, if ordered  Outcome: Progressing Towards Goal  Goal: Diagnostic Test/Procedures  Outcome: Progressing Towards Goal  Goal: Nutrition/Diet  Outcome: Progressing Towards Goal  Goal: Discharge Planning  Outcome: Progressing Towards Goal  Goal: Medications  Outcome: Progressing Towards Goal  Goal: Respiratory  Outcome: Progressing Towards Goal  Goal: Treatments/Interventions/Procedures  Outcome: Progressing Towards Goal  Goal: Psychosocial  Outcome: Progressing Towards Goal  Goal: *Oxygen saturation within defined limits  Outcome: Progressing Towards Goal  Goal: *Hemodynamically stable  Outcome: Progressing Towards Goal  Goal: *Demonstrates progressive activity  Outcome: Progressing Towards Goal  Goal: *Tolerating diet  Outcome: Progressing Towards Goal  Goal: *Optimal pain control at patient's stated goal  Outcome: Progressing Towards Goal     Problem: Pneumonia: Day 3  Goal: Off Pathway (Use only if patient is Off Pathway)  Outcome: Progressing Towards Goal  Goal: Activity/Safety  Outcome: Progressing Towards Goal  Goal: Consults, if ordered  Outcome: Progressing Towards Goal  Goal: Diagnostic Test/Procedures  Outcome: Progressing Towards Goal  Goal: Nutrition/Diet  Outcome: Progressing Towards Goal  Goal: Discharge Planning  Outcome: Progressing Towards Goal  Goal: Medications  Outcome: Progressing Towards Goal  Goal: Respiratory  Outcome: Progressing Towards Goal  Goal: Treatments/Interventions/Procedures  Outcome: Progressing Towards Goal  Goal: Psychosocial  Outcome: Progressing Towards Goal  Goal: *Oxygen saturation within defined limits  Outcome: Progressing Towards Goal  Goal: *Hemodynamically stable  Outcome: Progressing Towards Goal  Goal: *Demonstrates progressive activity  Outcome: Progressing Towards Goal  Goal: *Tolerating diet  Outcome: Progressing Towards Goal  Goal: *Describes available resources and support systems  Outcome: Progressing Towards Goal  Goal: *Optimal pain control at patient's stated goal  Outcome: Progressing Towards Goal     Problem: Pneumonia: Day 4  Goal: Off Pathway (Use only if patient is Off Pathway)  Outcome: Progressing Towards Goal  Goal: Activity/Safety  Outcome: Progressing Towards Goal  Goal: Nutrition/Diet  Outcome: Progressing Towards Goal  Goal: Discharge Planning  Outcome: Progressing Towards Goal  Goal: Medications  Outcome: Progressing Towards Goal  Goal: Respiratory  Outcome: Progressing Towards Goal  Goal: Treatments/Interventions/Procedures  Outcome: Progressing Towards Goal  Goal: Psychosocial  Outcome: Progressing Towards Goal     Problem: Pneumonia: Discharge Outcomes  Goal: *Demonstrates progressive activity  Outcome: Progressing Towards Goal  Goal: *Describes follow-up/return visits to physicians  Outcome: Progressing Towards Goal  Goal: *Tolerating diet  Outcome: Progressing Towards Goal  Goal: *Verbalizes name, dosage, time, side effects, and number of days to continue medications  Outcome: Progressing Towards Goal  Goal: *Influenza immunization  Outcome: Progressing Towards Goal  Goal: *Pneumococcal immunization  Outcome: Progressing Towards Goal  Goal: *Respiratory status at baseline  Outcome: Progressing Towards Goal  Goal: *Vital signs within defined limits  Outcome: Progressing Towards Goal  Goal: *Describes available resources and support systems  Outcome: Progressing Towards Goal  Goal: *Optimal pain control at patient's stated goal  Outcome: Progressing Towards Goal     Problem: Patient Education: Go to Patient Education Activity  Goal: Patient/Family Education  Outcome: Progressing Towards Goal

## 2021-06-07 LAB
ANION GAP SERPL CALC-SCNC: 7 MMOL/L (ref 3–18)
BASOPHILS # BLD: 0.2 K/UL (ref 0–0.1)
BASOPHILS NFR BLD: 2 % (ref 0–2)
BUN SERPL-MCNC: 3 MG/DL (ref 7–18)
BUN/CREAT SERPL: 13 (ref 12–20)
CALCIUM SERPL-MCNC: 7.7 MG/DL (ref 8.5–10.1)
CHLORIDE SERPL-SCNC: 102 MMOL/L (ref 100–111)
CO2 SERPL-SCNC: 24 MMOL/L (ref 21–32)
CREAT SERPL-MCNC: 0.24 MG/DL (ref 0.6–1.3)
DIFFERENTIAL METHOD BLD: ABNORMAL
EOSINOPHIL # BLD: 0.3 K/UL (ref 0–0.4)
EOSINOPHIL NFR BLD: 3 % (ref 0–5)
ERYTHROCYTE [DISTWIDTH] IN BLOOD BY AUTOMATED COUNT: 13.8 % (ref 11.6–14.5)
GLUCOSE SERPL-MCNC: 83 MG/DL (ref 74–99)
HCT VFR BLD AUTO: 25.1 % (ref 36–48)
HGB BLD-MCNC: 8.5 G/DL (ref 13–16)
INR PPP: 1.1 (ref 0.8–1.2)
LYMPHOCYTES # BLD: 1.7 K/UL (ref 0.9–3.6)
LYMPHOCYTES NFR BLD: 20 % (ref 21–52)
MAGNESIUM SERPL-MCNC: 2.1 MG/DL (ref 1.6–2.6)
MCH RBC QN AUTO: 30.8 PG (ref 24–34)
MCHC RBC AUTO-ENTMCNC: 33.9 G/DL (ref 31–37)
MCV RBC AUTO: 90.9 FL (ref 74–97)
MONOCYTES # BLD: 0.4 K/UL (ref 0.05–1.2)
MONOCYTES NFR BLD: 5 % (ref 3–10)
NEUTS SEG # BLD: 5.7 K/UL (ref 1.8–8)
NEUTS SEG NFR BLD: 70 % (ref 40–73)
PLATELET # BLD AUTO: 374 K/UL (ref 135–420)
PLATELET COMMENTS,PCOM: ABNORMAL
PMV BLD AUTO: 9.5 FL (ref 9.2–11.8)
POTASSIUM SERPL-SCNC: 3.7 MMOL/L (ref 3.5–5.5)
PROTHROMBIN TIME: 14.2 SEC (ref 11.5–15.2)
RBC # BLD AUTO: 2.76 M/UL (ref 4.35–5.65)
RBC MORPH BLD: ABNORMAL
SODIUM SERPL-SCNC: 133 MMOL/L (ref 136–145)
WBC # BLD AUTO: 8.3 K/UL (ref 4.6–13.2)

## 2021-06-07 PROCEDURE — 99232 SBSQ HOSP IP/OBS MODERATE 35: CPT | Performed by: EMERGENCY MEDICINE

## 2021-06-07 PROCEDURE — 74011250636 HC RX REV CODE- 250/636: Performed by: NURSE PRACTITIONER

## 2021-06-07 PROCEDURE — 74011000258 HC RX REV CODE- 258: Performed by: INTERNAL MEDICINE

## 2021-06-07 PROCEDURE — 74011250636 HC RX REV CODE- 250/636: Performed by: EMERGENCY MEDICINE

## 2021-06-07 PROCEDURE — 74011250636 HC RX REV CODE- 250/636: Performed by: INTERNAL MEDICINE

## 2021-06-07 PROCEDURE — 74011000258 HC RX REV CODE- 258: Performed by: NURSE PRACTITIONER

## 2021-06-07 PROCEDURE — 83735 ASSAY OF MAGNESIUM: CPT

## 2021-06-07 PROCEDURE — 85025 COMPLETE CBC W/AUTO DIFF WBC: CPT

## 2021-06-07 PROCEDURE — 97530 THERAPEUTIC ACTIVITIES: CPT

## 2021-06-07 PROCEDURE — 2709999900 HC NON-CHARGEABLE SUPPLY

## 2021-06-07 PROCEDURE — 65270000029 HC RM PRIVATE

## 2021-06-07 PROCEDURE — 85610 PROTHROMBIN TIME: CPT

## 2021-06-07 PROCEDURE — 80048 BASIC METABOLIC PNL TOTAL CA: CPT

## 2021-06-07 PROCEDURE — 99233 SBSQ HOSP IP/OBS HIGH 50: CPT | Performed by: INTERNAL MEDICINE

## 2021-06-07 PROCEDURE — 74011250637 HC RX REV CODE- 250/637: Performed by: NURSE PRACTITIONER

## 2021-06-07 PROCEDURE — 36415 COLL VENOUS BLD VENIPUNCTURE: CPT

## 2021-06-07 PROCEDURE — 74011250637 HC RX REV CODE- 250/637: Performed by: EMERGENCY MEDICINE

## 2021-06-07 PROCEDURE — APPNB30 APP NON BILLABLE TIME 0-30 MINS: Performed by: NURSE PRACTITIONER

## 2021-06-07 RX ORDER — IPRATROPIUM BROMIDE AND ALBUTEROL SULFATE 2.5; .5 MG/3ML; MG/3ML
3 SOLUTION RESPIRATORY (INHALATION)
Status: DISCONTINUED | OUTPATIENT
Start: 2021-06-07 | End: 2021-06-08 | Stop reason: HOSPADM

## 2021-06-07 RX ADMIN — Medication 100 MG: at 08:36

## 2021-06-07 RX ADMIN — HEPARIN SODIUM 5000 UNITS: 5000 INJECTION INTRAVENOUS; SUBCUTANEOUS at 00:17

## 2021-06-07 RX ADMIN — Medication 10 ML: at 18:20

## 2021-06-07 RX ADMIN — PIPERACILLIN AND TAZOBACTAM 4.5 G: 4; .5 INJECTION, POWDER, FOR SOLUTION INTRAVENOUS at 04:07

## 2021-06-07 RX ADMIN — Medication 10 ML: at 07:10

## 2021-06-07 RX ADMIN — HEPARIN SODIUM 5000 UNITS: 5000 INJECTION INTRAVENOUS; SUBCUTANEOUS at 15:16

## 2021-06-07 RX ADMIN — PIPERACILLIN AND TAZOBACTAM 3.38 G: 3; .375 INJECTION, POWDER, LYOPHILIZED, FOR SOLUTION INTRAVENOUS at 21:27

## 2021-06-07 RX ADMIN — PIPERACILLIN AND TAZOBACTAM 4.5 G: 4; .5 INJECTION, POWDER, FOR SOLUTION INTRAVENOUS at 08:37

## 2021-06-07 RX ADMIN — THERA TABS 1 TABLET: TAB at 08:36

## 2021-06-07 RX ADMIN — GUAIFENESIN 600 MG: 600 TABLET, EXTENDED RELEASE ORAL at 21:27

## 2021-06-07 RX ADMIN — Medication 3 MG: at 21:27

## 2021-06-07 RX ADMIN — HEPARIN SODIUM 5000 UNITS: 5000 INJECTION INTRAVENOUS; SUBCUTANEOUS at 08:37

## 2021-06-07 RX ADMIN — HEPARIN SODIUM 5000 UNITS: 5000 INJECTION INTRAVENOUS; SUBCUTANEOUS at 23:18

## 2021-06-07 RX ADMIN — GUAIFENESIN 600 MG: 600 TABLET, EXTENDED RELEASE ORAL at 08:36

## 2021-06-07 RX ADMIN — PIPERACILLIN AND TAZOBACTAM 3.38 G: 3; .375 INJECTION, POWDER, LYOPHILIZED, FOR SOLUTION INTRAVENOUS at 15:15

## 2021-06-07 RX ADMIN — Medication 10 ML: at 23:13

## 2021-06-07 NOTE — PROGRESS NOTES
Nutrition Note      Pt reported fair meal intake during previous days; poor/no intake today. Consuming Ensure drinks; fair/good intake. Tolerating diet. Pt agitated; complaining about being at the hospital; RN and MD informed and are already aware of patient's complaints/ concerns. BM 6/7. Noted plan for bronchoscopy tomorrow. Progressing towards nutrition goals.        Nutrition Recommendations/Plan:   - Continue current nutrition interventions; encourage/ monitor po intake of meals and supplements  - Continue MVI and thiamine      Electronically signed by Romelia Delgado RD on 6/7/2021 at 3:03 PM    Contact: [phone]

## 2021-06-07 NOTE — PROGRESS NOTES
Problem: Patient Education: Go to Patient Education Activity  Goal: Patient/Family Education  Outcome: Progressing Towards Goal     Problem: Pneumonia: Day 4  Goal: Off Pathway (Use only if patient is Off Pathway)  Outcome: Progressing Towards Goal  Goal: Activity/Safety  Outcome: Progressing Towards Goal  Goal: Nutrition/Diet  Outcome: Progressing Towards Goal  Goal: Discharge Planning  Outcome: Progressing Towards Goal  Goal: Medications  Outcome: Progressing Towards Goal  Goal: Respiratory  Outcome: Progressing Towards Goal  Goal: Treatments/Interventions/Procedures  Outcome: Progressing Towards Goal  Goal: Psychosocial  Outcome: Progressing Towards Goal     Problem: Pneumonia: Discharge Outcomes  Goal: *Demonstrates progressive activity  Outcome: Progressing Towards Goal  Goal: *Describes follow-up/return visits to physicians  Outcome: Progressing Towards Goal  Goal: *Tolerating diet  Outcome: Progressing Towards Goal  Goal: *Verbalizes name, dosage, time, side effects, and number of days to continue medications  Outcome: Progressing Towards Goal  Goal: *Influenza immunization  Outcome: Progressing Towards Goal  Goal: *Pneumococcal immunization  Outcome: Progressing Towards Goal  Goal: *Respiratory status at baseline  Outcome: Progressing Towards Goal  Goal: *Vital signs within defined limits  Outcome: Progressing Towards Goal  Goal: *Describes available resources and support systems  Outcome: Progressing Towards Goal  Goal: *Optimal pain control at patient's stated goal  Outcome: Progressing Towards Goal     Problem: Patient Education: Go to Patient Education Activity  Goal: Patient/Family Education  Outcome: Progressing Towards Goal     Problem: Patient Education: Go to Patient Education Activity  Goal: Patient/Family Education  Outcome: Progressing Towards Goal     Problem: Nutrition Deficit  Goal: *Optimize nutritional status  Outcome: Progressing Towards Goal     Problem: Risk for Spread of Infection  Goal: Prevent transmission of infectious organism to others  Description: Prevent the transmission of infectious organisms to other patients, staff members, and visitors. Outcome: Progressing Towards Goal     Problem: Patient Education:  Go to Education Activity  Goal: Patient/Family Education  Outcome: Progressing Towards Goal     Problem: Falls - Risk of  Goal: *Absence of Falls  Description: Document Buzz Prosper Fall Risk and appropriate interventions in the flowsheet. Outcome: Progressing Towards Goal  Note: Fall Risk Interventions:  Mobility Interventions: Bed/chair exit alarm, Patient to call before getting OOB         Medication Interventions: Bed/chair exit alarm    Elimination Interventions: Bed/chair exit alarm, Call light in reach    History of Falls Interventions: Bed/chair exit alarm         Problem: Patient Education: Go to Patient Education Activity  Goal: Patient/Family Education  Outcome: Progressing Towards Goal     Problem: Pain  Goal: *Control of Pain  Outcome: Progressing Towards Goal     Problem: Patient Education: Go to Patient Education Activity  Goal: Patient/Family Education  Outcome: Progressing Towards Goal     Problem: Pressure Injury - Risk of  Goal: *Prevention of pressure injury  Description: Document Raghav Scale and appropriate interventions in the flowsheet.   Outcome: Progressing Towards Goal     Problem: Patient Education: Go to Patient Education Activity  Goal: Patient/Family Education  Outcome: Progressing Towards Goal

## 2021-06-07 NOTE — PROGRESS NOTES
Reason for Admission:  Pneumonia [J18.9]                 RUR Score:    12%            Plan for utilizing home health:    No, not at this time, patient is refusing SNF and home health at this time. Likelihood of Readmission:   LOW                         Transition of Care Plan:              Initial assessment completed with patient. Cognitive status of patient: oriented to time, place, person and situation. Face sheet information confirmed:  yes. The patient designates his wife Soraida Pedroza [phone] to participate in his discharge plan and to receive any needed information. This patient lives in a single family home with his wife, with 5 steps to enter. Patient is able to navigate steps as needed. Prior to hospitalization, patient was considered to be independent with ADLs/IADLS : yes . Patient has a current ACP document on file: no.      Healthcare Decision Maker:     Click here to complete [address] including selection of the Healthcare Decision Maker Relationship (ie \"Primary\")    The patient's  wife will be available to transport patient home upon discharge. The patient already has Mirta Hobbs, [address] [facility] chair, [employer],  medical equipment available in the home. Patient is not currently active with home health. Patient has not stayed in a skilled nursing facility or rehab. This patient is on dialysis :no.    List of available SNF agencies were provided and reviewed with the patient prior to discharge. Freedom of choice signed: no, Patient refused. List of available Home Health agencies were provided and reviewed with the patient prior to discharge. Freedom of choice signed: no, Patient refused. Currently, the discharge plan is Home with Family Assistance. The patient states that he can obtain his medications from the pharmacy, and take his medications as directed.     Patient's current insurance is [employer]. Care Management Interventions  PCP Verified by CM:  Yes  Mode of Transport at Discharge: Self (Patient's wife will be transporting patient home at time of discharge.)  Transition of Care Consult (CM Consult): Discharge Planning  Discharge Durable Medical Equipment: No  Physical Therapy Consult: Yes  Occupational Therapy Consult: Yes  Speech Therapy Consult: No  Current Support Network: Lives with Spouse  Confirm Follow Up Transport: Family  Discharge Location  Discharge Placement: Home with family assistance        Alejandro Trejo RN  Case Management [phone]

## 2021-06-07 NOTE — PROGRESS NOTES
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 No TB risk factors, ETOH abuse makes risk of anaerobic organisms. · Hyponatremia -chronic, likely SIADH 2/2 lung infection vs. Malignancy vs. Malnutrition  · Probable COPD -40+ pack year smoker, emphysema on CT. On home albuterol  · B/L Leg weakness  · Weight loss  · Tobacco Abuse- long term smoker >40 pack years     Patient Active Problem List   Diagnosis Code    GERD (gastroesophageal reflux disease) K21.9    Sinusitis J32.9    ED (erectile dysfunction) N52.9    DJD (degenerative joint disease) of knee M17.10    Fall from standing W19. Thom Buffy    Rib pain on left side R07.81    Pneumonia J18.9      PLAN:   · RUL lesions likely represent a chronic infection- anaerobic vs. Fungal vs. AFB, but malignancy is also possible, especially in a high risk patient like him. Recommend treatment with empiric broad spectrum ABX with anaerobic coverage for now  · AFB neg x1, 2 others pending. · Continue ABX: zosyn  ·  Plan for bronchoscopy tomorrow for diagnostic sampling +/- biopsy  · Continue PRN duonebs and mucinex. No need for steroids at this time. · Bronchial hygiene protocol  · Smoking cessation, continue nicoderm patch  · Out patient testing- PFTs  · Assess home Oxygen needs at discharge  · OT, PT, OOB and ambulate  · FiO2 to keep SpO2 >=92%, HOB >=30 degree, aspiration precautions, aggressive pulmonary toileting, incentive spirometry.        Subjective/Interval History:     42-year-old cachectic male with past medical history of COPD, GERD, chronic compression fractures of T11/T12, and tobacco use admitted on 6/3/21 for RUL cavitary pneumonia.        21    · Pt very upset he is being \"held hostage\" and stating he wants to be discharged. Discussed plan of care and recommendations  · Bronchoscopy scheduled for tomorrow, patient agreed and signed consent for procedure  · Maintaining SpO2> 92% on RA. No complaints of worsening SOB, chest pain or cough  · No fevers, leukocytosis or hemoptysis    ROS:A comprehensive review of systems was negative except for that written in the HPI. Objective:   Vital Signs:    Visit Vitals  BP (!) 141/86   Pulse 88   Temp 97.6 °F (36.4 °C)   Resp 18   Ht 6' (1.829 m)   Wt 50.8 kg (112 lb)   SpO2 95%   BMI 15.19 kg/m²       O2 Device: None (Room air)       Temp (24hrs), Av.8 °F (36.6 °C), Min:97 °F (36.1 °C), Max:98.2 °F (36.8 °C)       Intake/Output:   Last shift:      701 - 1900  In: 480 [P.O.:480]  Out: -   Last 3 shifts: 1901 -  07  In: 1004 [P.O.:480; I.V.:2079]  Out: 3050 [Urine:3050]    Intake/Output Summary (Last 24 hours) at 2021 1221  Last data filed at 2021 0849  Gross per 24 hour   Intake 1602 ml   Output 2050 ml   Net -448 ml           Physical Exam:      General/Neurology: Alert, Awake, cachectic, in NAD   Head:               Normocephalic, without obvious abnormality  Eye:                 PERRL, EOM intact  Oral:                Mucus membranes moist  Lung:               B/l air entry fair. No rales. No rhonchi. No wheezing, decrease RUL sounds. Heart:              S1 S2 present  Abdomen:        Soft, non tender, BS+nt   Extremities:     No pedal edema.    Skin:                Dry, intact      DATA:  Labs:  Recent Labs     21  0324 21  0244 21  0320   WBC 8.3 7.7 8.7   HGB 8.5* 8.0* 8.2*   HCT 25.1* 24.2* 23.6*    355 349     Recent Labs     21  0324 21  0244 06/05/21  0320   * 131* 130*   K 3.7 3.7 3.5    103 101   CO2 24 23 22   GLU 83 79 72*   BUN 3* 5* 5*   CREA 0.24* 0.22* 0.20*   CA 7.7* 7.9* 7.6*   MG 2.1  --  2.0   INR 1.1  --   --      No results for input(s): PH, PCO2, PO2, HCO3, FIO2 in the last 72 hours. PFT:                                                     Echo:    Imaging:  [x]I have personally reviewed the patients radiographs  []Radiographs reviewed with radiologist   [x]No change from prior, tubes and lines in adequate position  []Improved   []Worsening    Iris Oseguera, NP - C  06/07/2021  Pulmonary, Critical Care Medicine  [facility]

## 2021-06-07 NOTE — PROGRESS NOTES
Problem: Pneumonia: Day 1  Goal: Off Pathway (Use only if patient is Off Pathway)  Outcome: Resolved/Met  Goal: Activity/Safety  Outcome: Resolved/Met  Goal: Consults, if ordered  Outcome: Resolved/Met  Goal: Diagnostic Test/Procedures  Outcome: Resolved/Met  Goal: Nutrition/Diet  Outcome: Resolved/Met  Goal: Medications  Outcome: Resolved/Met  Goal: Respiratory  Outcome: Resolved/Met  Goal: Treatments/Interventions/Procedures  Outcome: Resolved/Met  Goal: Psychosocial  Outcome: Resolved/Met  Goal: *Oxygen saturation within defined limits  Outcome: Resolved/Met  Goal: *Influenza vaccine administered (October-March)  Outcome: Resolved/Met  Goal: *Pneumoccocal vaccine administered  Outcome: Resolved/Met  Goal: *Hemodynamically stable  Outcome: Resolved/Met  Goal: *Demonstrates progressive activity  Outcome: Resolved/Met  Goal: *Tolerating diet  Outcome: Resolved/Met     Problem: Pneumonia: Day 2  Goal: Off Pathway (Use only if patient is Off Pathway)  Outcome: Resolved/Met  Goal: Activity/Safety  Outcome: Resolved/Met  Goal: Consults, if ordered  Outcome: Resolved/Met  Goal: Diagnostic Test/Procedures  Outcome: Resolved/Met  Goal: Nutrition/Diet  Outcome: Resolved/Met  Goal: Discharge Planning  Outcome: Resolved/Met  Goal: Medications  Outcome: Resolved/Met  Goal: Respiratory  Outcome: Resolved/Met  Goal: Treatments/Interventions/Procedures  Outcome: Resolved/Met  Goal: Psychosocial  Outcome: Resolved/Met  Goal: *Oxygen saturation within defined limits  Outcome: Resolved/Met  Goal: *Hemodynamically stable  Outcome: Resolved/Met  Goal: *Demonstrates progressive activity  Outcome: Resolved/Met  Goal: *Tolerating diet  Outcome: Resolved/Met  Goal: *Optimal pain control at patient's stated goal  Outcome: Resolved/Met     Problem: Pneumonia: Day 3  Goal: Off Pathway (Use only if patient is Off Pathway)  Outcome: Resolved/Met  Goal: Activity/Safety  Outcome: Resolved/Met  Goal: Consults, if ordered  Outcome: Resolved/Met  Goal: Diagnostic Test/Procedures  Outcome: Resolved/Met  Goal: Nutrition/Diet  Outcome: Resolved/Met  Goal: Discharge Planning  Outcome: Resolved/Met  Goal: Medications  Outcome: Resolved/Met  Goal: Respiratory  Outcome: Resolved/Met  Goal: Treatments/Interventions/Procedures  Outcome: Resolved/Met  Goal: Psychosocial  Outcome: Resolved/Met  Goal: *Oxygen saturation within defined limits  Outcome: Resolved/Met  Goal: *Hemodynamically stable  Outcome: Resolved/Met  Goal: *Demonstrates progressive activity  Outcome: Resolved/Met  Goal: *Tolerating diet  Outcome: Resolved/Met  Goal: *Describes available resources and support systems  Outcome: Resolved/Met  Goal: *Optimal pain control at patient's stated goal  Outcome: Resolved/Met

## 2021-06-07 NOTE — PROGRESS NOTES
Bedside shift change report given to David Seo (oncoming nurse) by Juan Ramon Garcia RN (offgoing nurse). Report included the following information SBAR, Kardex, Intake/Output, MAR and Recent Results.

## 2021-06-07 NOTE — PROGRESS NOTES
CM spoke with patient. Patient said he is not going to a SNF at time of discharge. CM then spoke to him about [address], he said no, I am not going to do that either. Patient said he plan to go home tomorrow after his biopsy, and he will work on his on therapy at home everyday by himself, he doesn't need anyone to come in to help him get stronger, he can do this himself. Patient then said he would leave tomorrow regardless if he was discharged or not. LARS then explained to him that if he leaves against medical advice, his hospitalization stay would not be covered, and asked him before he did this to ask his doctor for a discharge order. Carlito Perez Divers and updated him.            Marivel Jonas, RN  Case Management [phone]

## 2021-06-07 NOTE — PROGRESS NOTES
Cardiopulmonary Navigator Recommendations    The following indications were identified in this patient chart:    Assessment:  · RUL Cavitary pneumonia  · - likely chronic necrotizing infection, possible malignancy  · - No TB risk factors, EtOH abuse makes risk of anaerobic organisms. · - chronic, likely SIADH 2/2 lung infection vs. Malignancy vs. Malnutrition  · Probable COPD  · - 40+ pack year smoker, emphysema on CT  · - on home albuterol  · Weight loss  · Tobacco Abuse  · - long term smoker >40 pack years        The following recommendations are based on chart review. They are intended to help patient transition home when appropriate, and to help reduce risk of re-admission. Rehabilitation:  Please consider referring patient for evaluation at [address] [place] [address] for:    Acute Inpatient rehabilitation followed by Outpatient Pulmonary Rehabilitation      Nebulizer:  Indication: Hand Held Nebulizer Administration during admission      High Frequency Chest Wall Oscillation Vest:  Indication: Atelectasis / Chronic Bronchitis / Emphysema/ Frequent Respiratory Infections / Mucous Plugs / Pneumonia / Resistant Bacteria via sputum culture/  Available Devices:  [employer] Vest  SmartVest Connect  [employer] InCourage System      Mucous Clearance Devices: Indication:  Atelectasis / Chronic Bronchitis / Emphysema/ Frequent Respiratory Infections / Mucous Plugs / Pneumonia / Resistant Bacteria via sputum culture/    Available Devices:      (Manual Devices)         Aerobika         VibraPEP          Acapella     (Automated Devices)  Mokaara Cough System  [employer] Doctor kinetic Cough Assist        Will continue to follow patient during this admission, and update recommendations as pt progresses.

## 2021-06-07 NOTE — PROGRESS NOTES
Occupational Therapy Goals  Initiated 6/4/2021 within 7 day(s). 1.  Patient will perform functional activity standing for 4-7 minutes with supervision/set-up, F+ balance. 2.  Patient will perform lower body dressing with supervision/set-up using AE prn.  3.  Patient will perform toilet transfers with supervision/set-up. 4.  Patient will perform all aspects of toileting with supervision/set-up. 5.  Patient will participate in upper extremity therapeutic exercise/activities with modified independence for 8 minutes. 6.  Patient will utilize energy conservation techniques during functional activities with verbal cues. Prior Level of Function:Patient was independent with self-care, exception to bathing and used a cane/RW for functional mobility PTA for community distances. Problem: Self Care Deficits Care Plan (Adult)  Goal: *Acute Goals and Plan of Care (Insert Text)  Outcome: Progressing Towards Goal  OCCUPATIONAL THERAPY TREATMENT    Patient: Jonathan Lin (37 y.o. male)  Date: 6/7/2021  Diagnosis: Pneumonia [J18.9] <principal problem not specified>  Procedure(s) (LRB):  FLEXIBLE BRONCHOSCOPY WITH BIOPSIES/NO C-ARM (N/A)    Precautions: Fall, Skin (spinal protective; compression deforminties T11/T12)    Chart, occupational therapy assessment, plan of care, and goals were reviewed. ASSESSMENT:  Pt easily agitated w/requests and questions. Pt refusing most education. Pt educated on energy conservation techniques w/ADLs and benefits of shower chair. Educated on importance of healthy lifestyle ie good diet, exercise and medication mgt to prevent rehospitalization. Pt tolerates ~ 5 minutes sitting EOB. Pt refusing standing activity and impulsivity returns to supine. Pt asking this therapist to leave and states that he does not want occupational therapy. Sophie Curry, aware.    Progression toward goals:  []          Improving appropriately and progressing toward goals  [x] Improving slowly and progressing toward goals  []          Not making progress toward goals and plan of care will be adjusted     PLAN:  Patient continues to benefit from skilled intervention to address the above impairments. Continue treatment per established plan of care. Discharge Recommendations:  Rehab  Further Equipment Recommendations for Discharge:  RW, shower chair and wheelchair for community re-entry     SUBJECTIVE:   Patient stated Is somebody going to get me a wheelchair for my house?     OBJECTIVE DATA SUMMARY:   Cognitive/Behavioral Status:  Neurologic State: Agitated, Alert  Orientation Level: Oriented X4  Cognition: Impulsive  Safety/Judgement: Fall prevention    Functional Mobility and Transfers for ADLs:   Bed Mobility:  Supine to Sit: Supervision (w/HOB raised)  Sit to Supine: Supervision  Scooting: Supervision    Balance:  Sitting: Intact    Pain:  Pain level pre-treatment: 0/10   Pain level post-treatment: 0/10    Activity Tolerance:    Poor    Please refer to the flowsheet for vital signs taken during this treatment. After treatment:   []  Patient left in no apparent distress sitting up in chair  [x]  Patient left in no apparent distress in bed  [x]  Call bell left within reach  [x]  Nursing, [address], notified  []  Caregiver present  []  Bed alarm activated    COMMUNICATION/EDUCATION:   [] Role of Occupational Therapy in the acute care setting  [] Home safety education was provided and the patient/caregiver indicated understanding. [] Patient/family have participated as able in working towards goals and plan of care. [] Patient/family agree to work toward stated goals and plan of care. [x] Patient understands intent and goals of therapy, but is neutral about his/her participation. [] Patient is unable to participate in goal setting and plan of care.       Thank you for this referral.  ZEHRA [place]  Time Calculation: 18 mins

## 2021-06-07 NOTE — PROGRESS NOTES
Problem: Patient Education: Go to Patient Education Activity  Goal: Patient/Family Education  Outcome: Progressing Towards Goal     Problem: Pneumonia: Day 4  Goal: Off Pathway (Use only if patient is Off Pathway)  Outcome: Progressing Towards Goal  Goal: Activity/Safety  Outcome: Progressing Towards Goal  Goal: Nutrition/Diet  Outcome: Progressing Towards Goal  Goal: Discharge Planning  Outcome: Progressing Towards Goal  Goal: Medications  Outcome: Progressing Towards Goal  Goal: Respiratory  Outcome: Progressing Towards Goal  Goal: Treatments/Interventions/Procedures  Outcome: Progressing Towards Goal  Goal: Psychosocial  Outcome: Progressing Towards Goal     Problem: Pneumonia: Discharge Outcomes  Goal: *Demonstrates progressive activity  Outcome: Progressing Towards Goal  Goal: *Describes follow-up/return visits to physicians  Outcome: Progressing Towards Goal  Goal: *Tolerating diet  Outcome: Progressing Towards Goal  Goal: *Verbalizes name, dosage, time, side effects, and number of days to continue medications  Outcome: Progressing Towards Goal  Goal: *Influenza immunization  Outcome: Progressing Towards Goal  Goal: *Pneumococcal immunization  Outcome: Progressing Towards Goal  Goal: *Respiratory status at baseline  Outcome: Progressing Towards Goal  Goal: *Vital signs within defined limits  Outcome: Progressing Towards Goal  Goal: *Describes available resources and support systems  Outcome: Progressing Towards Goal  Goal: *Optimal pain control at patient's stated goal  Outcome: Progressing Towards Goal     Problem: Patient Education: Go to Patient Education Activity  Goal: Patient/Family Education  Outcome: Progressing Towards Goal     Problem: Patient Education: Go to Patient Education Activity  Goal: Patient/Family Education  Outcome: Progressing Towards Goal     Problem: Nutrition Deficit  Goal: *Optimize nutritional status  Outcome: Progressing Towards Goal     Problem: Risk for Spread of Infection  Goal: Prevent transmission of infectious organism to others  Description: Prevent the transmission of infectious organisms to other patients, staff members, and visitors. Outcome: Progressing Towards Goal     Problem: Patient Education:  Go to Education Activity  Goal: Patient/Family Education  Outcome: Progressing Towards Goal     Problem: Falls - Risk of  Goal: *Absence of Falls  Description: Document Eid Maple Fall Risk and appropriate interventions in the flowsheet. Outcome: Progressing Towards Goal  Note: Fall Risk Interventions:  Mobility Interventions: Assess mobility with egress test, PT Consult for mobility concerns, PT Consult for assist device competence, OT consult for ADLs         Medication Interventions: Bed/chair exit alarm, Evaluate medications/consider consulting pharmacy    Elimination Interventions: Bed/chair exit alarm, Call light in reach, Patient to call for help with toileting needs    History of Falls Interventions: Bed/chair exit alarm, Consult care management for discharge planning, Door open when patient unattended, Evaluate medications/consider consulting pharmacy, Investigate reason for fall, Room close to nurse's station, Utilize gait belt for transfer/ambulation, Assess for delayed presentation/identification of injury for 48 hrs (comment for end date), Vital signs minimum Q4HRs X 24 hrs (comment for end date)         Problem: Patient Education: Go to Patient Education Activity  Goal: Patient/Family Education  Outcome: Progressing Towards Goal     Problem: Pain  Goal: *Control of Pain  Outcome: Progressing Towards Goal     Problem: Patient Education: Go to Patient Education Activity  Goal: Patient/Family Education  Outcome: Progressing Towards Goal     Problem: Pressure Injury - Risk of  Goal: *Prevention of pressure injury  Description: Document Raghav Scale and appropriate interventions in the flowsheet.   Outcome: Progressing Towards Goal  Note: Pressure Injury Interventions:  Sensory Interventions: Assess changes in LOC, Avoid rigorous massage over bony prominences, Chair cushion, Check visual cues for pain, Discuss PT/OT consult with provider, Keep linens dry and wrinkle-free, Maintain/enhance activity level, Minimize linen layers, Monitor skin under medical devices, Pad between skin to skin, Pressure redistribution bed/mattress (bed type), Sit a 90-degree angle/use footstool if needed, Turn and reposition approx. every two hours (pillows and wedges if needed)    Moisture Interventions: Absorbent underpads, Apply protective barrier, creams and emollients, Assess need for specialty bed, Check for incontinence Q2 hours and as needed, Contain wound drainage, Limit adult briefs, Maintain skin hydration (lotion/cream), Minimize layers, Moisture barrier    Activity Interventions: Pressure redistribution bed/mattress(bed type), PT/OT evaluation    Mobility Interventions: Assess need for specialty bed, Float heels, HOB 30 degrees or less, Pressure redistribution bed/mattress (bed type), PT/OT evaluation, Turn and reposition approx.  every two hours(pillow and wedges)    Nutrition Interventions: Document food/fluid/supplement intake, Discuss nutritional consult with provider, Offer support with meals,snacks and hydration    Friction and Shear Interventions: Apply protective barrier, creams and emollients, Lift sheet, Minimize layers, Foam dressings/transparent film/skin sealants                Problem: Patient Education: Go to Patient Education Activity  Goal: Patient/Family Education  Outcome: Progressing Towards Goal

## 2021-06-07 NOTE — PROGRESS NOTES
[facility]ist [facility]  Progress Note    Patient: Truman Eng Age: 61 y.o. : 1955 MR#: [id number] SSN: [id number]  Date/Time: 2021    Subjective:     Patient is sitting in bed in no apparent distress, awake and alert. Complains of a dry cough. Daughter at bedside    Assessment/Plan:     Right upper lobe pneumonia with cavitation  Likely underlying COPD  Tobacco abuse  Hyponatremia  Bilateral lower extremity weaknessimproving    Plan  On Zosyn  Continue airborne precautions. AFB smear results are pending  Pulmonary is planning a bronchoscopy for tomorrow  ID and pulmonary are following  Smoking cessation education  Sodium is improving  PT and OT  Vitamin supplements  PT and OT are recommending SNF/rehab. I discussed PT and OT recommendations with the patient and he verbalized understanding. Patient declines skilled nursing facility or rehab at this time. Patient is adamant about going home. Patient states that he lives with his wife and daughter and they will be helping him. I have placed orders for home health care and DME's.     Case discussed with:  [x]Patient  []Family  []Nursing  []Case Management  DVT Prophylaxis:  []Lovenox  [x]Hep SQ  []SCDs  []Coumadin   []On Heparin gtt    Objective:   VS:   Visit Vitals  /68   Pulse 91   Temp 98.1 °F (36.7 °C)   Resp 20   Ht 6' (1.829 m)   Wt 50.8 kg (112 lb)   SpO2 94%   BMI 15.19 kg/m²      Tmax/24hrs: Temp (24hrs), Av.8 °F (36.6 °C), Min:97 °F (36.1 °C), Max:98.2 °F (36.8 °C)    Input/Output:     Intake/Output Summary (Last 24 hours) at 2021 1624  Last data filed at 2021 1222  Gross per 24 hour   Intake 1602 ml   Output 2350 ml   Net -748 ml       General:  Awake, alert  Cardiovascular:  S1S2+, RRR  Pulmonary: Coarse breath sounds bilaterally  GI:  Soft, BS+, NT, ND  Extremities:  No edema        Labs:    Recent Results (from the past 24 hour(s))   CBC WITH AUTOMATED DIFF    Collection Time: 06/07/21  3:24 AM   Result Value Ref Range    WBC 8.3 4.6 - 13.2 K/uL    RBC 2.76 (L) 4.35 - 5.65 M/uL    HGB 8.5 (L) 13.0 - 16.0 g/dL    HCT 25.1 (L) 36.0 - 48.0 %    MCV 90.9 74.0 - 97.0 FL    MCH 30.8 24.0 - 34.0 PG    MCHC 33.9 31.0 - 37.0 g/dL    RDW 13.8 11.6 - 14.5 %    PLATELET 211 897 - 372 K/uL    MPV 9.5 9.2 - 11.8 FL    NEUTROPHILS 70 40 - 73 %    LYMPHOCYTES 20 (L) 21 - 52 %    MONOCYTES 5 3 - 10 %    EOSINOPHILS 3 0 - 5 %    BASOPHILS 2 0 - 2 %    ABS. NEUTROPHILS 5.7 1.8 - 8.0 K/UL    ABS. LYMPHOCYTES 1.7 0.9 - 3.6 K/UL    ABS. MONOCYTES 0.4 0.05 - 1.2 K/UL    ABS. EOSINOPHILS 0.3 0.0 - 0.4 K/UL    ABS.  BASOPHILS 0.2 (H) 0.0 - 0.1 K/UL    DF MANUAL      PLATELET COMMENTS ADEQUATE PLATELETS      RBC COMMENTS NORMOCYTIC, NORMOCHROMIC     METABOLIC PANEL, BASIC    Collection Time: 06/07/21  3:24 AM   Result Value Ref Range    Sodium 133 (L) 136 - 145 mmol/L    Potassium 3.7 3.5 - 5.5 mmol/L    Chloride 102 100 - 111 mmol/L    CO2 24 21 - 32 mmol/L    Anion gap 7 3.0 - 18 mmol/L    Glucose 83 74 - 99 mg/dL    BUN 3 (L) 7.0 - 18 MG/DL    Creatinine 0.24 (L) 0.6 - 1.3 MG/DL    BUN/Creatinine ratio 13 12 - 20      GFR est AA >60 >60 ml/min/1.73m2    GFR est non-AA >60 >60 ml/min/1.73m2    Calcium 7.7 (L) 8.5 - 10.1 MG/DL   MAGNESIUM    Collection Time: 06/07/21  3:24 AM   Result Value Ref Range    Magnesium 2.1 1.6 - 2.6 mg/dL   PROTHROMBIN TIME + INR    Collection Time: 06/07/21  3:24 AM   Result Value Ref Range    Prothrombin time 14.2 11.5 - 15.2 sec    INR 1.1 0.8 - 1.2       Additional Data Reviewed:      Signed By: Martha Marino MD     June 7, 2021

## 2021-06-08 ENCOUNTER — ANESTHESIA (OUTPATIENT)
Dept: ENDOSCOPY | Age: 66
DRG: 871 | End: 2021-06-08
Payer: COMMERCIAL

## 2021-06-08 ENCOUNTER — ANESTHESIA EVENT (OUTPATIENT)
Dept: ENDOSCOPY | Age: 66
DRG: 871 | End: 2021-06-08
Payer: COMMERCIAL

## 2021-06-08 ENCOUNTER — HOME HEALTH ADMISSION (OUTPATIENT)
Dept: HOME HEALTH SERVICES | Facility: HOME HEALTH | Age: 66
End: 2021-06-08

## 2021-06-08 VITALS
HEIGHT: 72 IN | OXYGEN SATURATION: 97 % | TEMPERATURE: 97.1 F | BODY MASS INDEX: 15.58 KG/M2 | HEART RATE: 85 BPM | WEIGHT: 115 LBS | DIASTOLIC BLOOD PRESSURE: 71 MMHG | RESPIRATION RATE: 19 BRPM | SYSTOLIC BLOOD PRESSURE: 124 MMHG

## 2021-06-08 LAB
M TB IFN-G BLD-IMP: NEGATIVE
QUANTIFERON CRITERIA, QFI1T: NORMAL
QUANTIFERON MITOGEN VALUE: >10 IU/ML
QUANTIFERON NIL VALUE: 0.25 IU/ML
QUANTIFERON TB1 AG: 0.2 IU/ML
QUANTIFERON TB2 AG: 0.2 IU/ML

## 2021-06-08 PROCEDURE — 77030003454 HC NDL BIOP BRONCH BSC -B: Performed by: INTERNAL MEDICINE

## 2021-06-08 PROCEDURE — 99233 SBSQ HOSP IP/OBS HIGH 50: CPT | Performed by: INTERNAL MEDICINE

## 2021-06-08 PROCEDURE — 74011250637 HC RX REV CODE- 250/637: Performed by: ANESTHESIOLOGY

## 2021-06-08 PROCEDURE — 87070 CULTURE OTHR SPECIMN AEROBIC: CPT

## 2021-06-08 PROCEDURE — 31624 DX BRONCHOSCOPE/LAVAGE: CPT | Performed by: INTERNAL MEDICINE

## 2021-06-08 PROCEDURE — 87153 DNA/RNA SEQUENCING: CPT

## 2021-06-08 PROCEDURE — 77030012699 HC VLV SUC CNTRL OCOA -A: Performed by: INTERNAL MEDICINE

## 2021-06-08 PROCEDURE — 74011250636 HC RX REV CODE- 250/636: Performed by: ANESTHESIOLOGY

## 2021-06-08 PROCEDURE — 00520 ANES CLOSED CHEST PX NOS: CPT | Performed by: ANESTHESIOLOGY

## 2021-06-08 PROCEDURE — 77030018823 HC SLV COMPR VENO -B: Performed by: INTERNAL MEDICINE

## 2021-06-08 PROCEDURE — 74011000250 HC RX REV CODE- 250: Performed by: NURSE ANESTHETIST, CERTIFIED REGISTERED

## 2021-06-08 PROCEDURE — 87116 MYCOBACTERIA CULTURE: CPT

## 2021-06-08 PROCEDURE — 2709999900 HC NON-CHARGEABLE SUPPLY: Performed by: INTERNAL MEDICINE

## 2021-06-08 PROCEDURE — 88305 TISSUE EXAM BY PATHOLOGIST: CPT

## 2021-06-08 PROCEDURE — 87077 CULTURE AEROBIC IDENTIFY: CPT

## 2021-06-08 PROCEDURE — 74011000258 HC RX REV CODE- 258: Performed by: INTERNAL MEDICINE

## 2021-06-08 PROCEDURE — 87149 DNA/RNA DIRECT PROBE: CPT

## 2021-06-08 PROCEDURE — 77030022556 HC FCPS BIOP TIS ENDOSC BSC -B: Performed by: INTERNAL MEDICINE

## 2021-06-08 PROCEDURE — 76060000031 HC ANESTHESIA FIRST 0.5 HR: Performed by: INTERNAL MEDICINE

## 2021-06-08 PROCEDURE — 31623 DX BRONCHOSCOPE/BRUSH: CPT | Performed by: INTERNAL MEDICINE

## 2021-06-08 PROCEDURE — 77030003400 HC NDL ASPIR BIOP CNMD -B: Performed by: INTERNAL MEDICINE

## 2021-06-08 PROCEDURE — 88112 CYTOPATH CELL ENHANCE TECH: CPT

## 2021-06-08 PROCEDURE — 74011250636 HC RX REV CODE- 250/636: Performed by: INTERNAL MEDICINE

## 2021-06-08 PROCEDURE — 87102 FUNGUS ISOLATION CULTURE: CPT

## 2021-06-08 PROCEDURE — 00520 ANES CLOSED CHEST PX NOS: CPT | Performed by: NURSE ANESTHETIST, CERTIFIED REGISTERED

## 2021-06-08 PROCEDURE — 99239 HOSP IP/OBS DSCHRG MGMT >30: CPT | Performed by: EMERGENCY MEDICINE

## 2021-06-08 PROCEDURE — 97530 THERAPEUTIC ACTIVITIES: CPT

## 2021-06-08 PROCEDURE — 77030013140 HC MSK NEB VYRM -A: Performed by: INTERNAL MEDICINE

## 2021-06-08 PROCEDURE — 74011250636 HC RX REV CODE- 250/636: Performed by: NURSE ANESTHETIST, CERTIFIED REGISTERED

## 2021-06-08 PROCEDURE — 76040000019: Performed by: INTERNAL MEDICINE

## 2021-06-08 PROCEDURE — 87186 SC STD MICRODIL/AGAR DIL: CPT

## 2021-06-08 RX ORDER — SODIUM CHLORIDE, SODIUM LACTATE, POTASSIUM CHLORIDE, CALCIUM CHLORIDE 600; 310; 30; 20 MG/100ML; MG/100ML; MG/100ML; MG/100ML
75 INJECTION, SOLUTION INTRAVENOUS CONTINUOUS
Status: DISCONTINUED | OUTPATIENT
Start: 2021-06-08 | End: 2021-06-08 | Stop reason: HOSPADM

## 2021-06-08 RX ORDER — AMOXICILLIN AND CLAVULANATE POTASSIUM 875; 125 MG/1; MG/1
1 TABLET, FILM COATED ORAL EVERY 12 HOURS
Qty: 42 TABLET | Refills: 0 | Status: SHIPPED | OUTPATIENT
Start: 2021-06-08 | End: 2021-06-29

## 2021-06-08 RX ORDER — AMOXICILLIN AND CLAVULANATE POTASSIUM 875; 125 MG/1; MG/1
1 TABLET, FILM COATED ORAL EVERY 12 HOURS
Status: DISCONTINUED | OUTPATIENT
Start: 2021-06-08 | End: 2021-06-08 | Stop reason: HOSPADM

## 2021-06-08 RX ORDER — THERA TABS 400 MCG
1 TAB ORAL DAILY
Qty: 30 TABLET | Refills: 0 | Status: SHIPPED | OUTPATIENT
Start: 2021-06-08

## 2021-06-08 RX ORDER — LIDOCAINE HYDROCHLORIDE 10 MG/ML
2 INJECTION, SOLUTION EPIDURAL; INFILTRATION; INTRACAUDAL; PERINEURAL ONCE
Status: DISCONTINUED | OUTPATIENT
Start: 2021-06-08 | End: 2021-06-08 | Stop reason: HOSPADM

## 2021-06-08 RX ORDER — ALBUTEROL SULFATE 90 UG/1
2 AEROSOL, METERED RESPIRATORY (INHALATION)
Qty: 1 INHALER | Refills: 0 | Status: SHIPPED | OUTPATIENT
Start: 2021-06-08

## 2021-06-08 RX ORDER — PROPOFOL 10 MG/ML
INJECTION, EMULSION INTRAVENOUS AS NEEDED
Status: DISCONTINUED | OUTPATIENT
Start: 2021-06-08 | End: 2021-06-08 | Stop reason: HOSPADM

## 2021-06-08 RX ORDER — GUAIFENESIN 600 MG/1
600 TABLET, EXTENDED RELEASE ORAL EVERY 12 HOURS
Qty: 42 TABLET | Refills: 0 | Status: SHIPPED | OUTPATIENT
Start: 2021-06-08 | End: 2021-06-29

## 2021-06-08 RX ORDER — LIDOCAINE HYDROCHLORIDE 20 MG/ML
INJECTION, SOLUTION EPIDURAL; INFILTRATION; INTRACAUDAL; PERINEURAL AS NEEDED
Status: DISCONTINUED | OUTPATIENT
Start: 2021-06-08 | End: 2021-06-08 | Stop reason: HOSPADM

## 2021-06-08 RX ORDER — SODIUM CHLORIDE 0.9 % (FLUSH) 0.9 %
5-40 SYRINGE (ML) INJECTION EVERY 8 HOURS
Status: DISCONTINUED | OUTPATIENT
Start: 2021-06-08 | End: 2021-06-08 | Stop reason: HOSPADM

## 2021-06-08 RX ORDER — SODIUM CHLORIDE 9 MG/ML
25 INJECTION, SOLUTION INTRAVENOUS CONTINUOUS
Status: DISCONTINUED | OUTPATIENT
Start: 2021-06-08 | End: 2021-06-08 | Stop reason: HOSPADM

## 2021-06-08 RX ORDER — FAMOTIDINE 20 MG/1
20 TABLET, FILM COATED ORAL ONCE
Status: COMPLETED | OUTPATIENT
Start: 2021-06-08 | End: 2021-06-08

## 2021-06-08 RX ORDER — SODIUM CHLORIDE 0.9 % (FLUSH) 0.9 %
5-40 SYRINGE (ML) INJECTION AS NEEDED
Status: DISCONTINUED | OUTPATIENT
Start: 2021-06-08 | End: 2021-06-08 | Stop reason: HOSPADM

## 2021-06-08 RX ORDER — LIDOCAINE HYDROCHLORIDE 40 MG/ML
SOLUTION TOPICAL ONCE
Status: DISCONTINUED | OUTPATIENT
Start: 2021-06-08 | End: 2021-06-08 | Stop reason: HOSPADM

## 2021-06-08 RX ORDER — LIDOCAINE HYDROCHLORIDE 20 MG/ML
JELLY TOPICAL ONCE
Status: DISCONTINUED | OUTPATIENT
Start: 2021-06-08 | End: 2021-06-08 | Stop reason: HOSPADM

## 2021-06-08 RX ADMIN — FAMOTIDINE 20 MG: 20 TABLET ORAL at 11:16

## 2021-06-08 RX ADMIN — Medication 10 ML: at 06:00

## 2021-06-08 RX ADMIN — PROPOFOL 50 MG: 10 INJECTION, EMULSION INTRAVENOUS at 12:44

## 2021-06-08 RX ADMIN — PROPOFOL 20 MG: 10 INJECTION, EMULSION INTRAVENOUS at 12:58

## 2021-06-08 RX ADMIN — PROPOFOL 20 MG: 10 INJECTION, EMULSION INTRAVENOUS at 12:56

## 2021-06-08 RX ADMIN — PIPERACILLIN AND TAZOBACTAM 3.38 G: 3; .375 INJECTION, POWDER, LYOPHILIZED, FOR SOLUTION INTRAVENOUS at 14:47

## 2021-06-08 RX ADMIN — PROPOFOL 20 MG: 10 INJECTION, EMULSION INTRAVENOUS at 12:50

## 2021-06-08 RX ADMIN — SODIUM CHLORIDE, SODIUM LACTATE, POTASSIUM CHLORIDE, AND CALCIUM CHLORIDE 75 ML/HR: 600; 310; 30; 20 INJECTION, SOLUTION INTRAVENOUS at 11:15

## 2021-06-08 RX ADMIN — PIPERACILLIN AND TAZOBACTAM 3.38 G: 3; .375 INJECTION, POWDER, LYOPHILIZED, FOR SOLUTION INTRAVENOUS at 04:05

## 2021-06-08 RX ADMIN — PROPOFOL 20 MG: 10 INJECTION, EMULSION INTRAVENOUS at 12:52

## 2021-06-08 RX ADMIN — PROPOFOL 20 MG: 10 INJECTION, EMULSION INTRAVENOUS at 12:53

## 2021-06-08 RX ADMIN — PROPOFOL 20 MG: 10 INJECTION, EMULSION INTRAVENOUS at 12:46

## 2021-06-08 RX ADMIN — PROPOFOL 20 MG: 10 INJECTION, EMULSION INTRAVENOUS at 12:48

## 2021-06-08 RX ADMIN — PROPOFOL 20 MG: 10 INJECTION, EMULSION INTRAVENOUS at 12:45

## 2021-06-08 RX ADMIN — LIDOCAINE HYDROCHLORIDE 60 MG: 20 INJECTION, SOLUTION EPIDURAL; INFILTRATION; INTRACAUDAL; PERINEURAL at 12:44

## 2021-06-08 RX ADMIN — PROPOFOL 20 MG: 10 INJECTION, EMULSION INTRAVENOUS at 13:00

## 2021-06-08 NOTE — PROGRESS NOTES
Xu Elmore from [employer] called CM, patient cannot get a transport chair this is for Hospice patients only. Patient cannot get a Wheelchair and a [address], patient has to be unable to walk with a [employer] or Mitzi Carlisle to get a Wheelchair. Xu Elmore ordering a Rolling Walker and Shower Chair only for patient. Xu Elmore said patient may have to pay out of pocket for the [employer], she will call patient once she knows for sure. CM spoke with patient and updated him. CM called Karen Ribera with [facility] and updated her. Dr. Melissa Herrmann updated via [employer]. LARS spoke to [facility] and asked her to do a Walk Test, ordered by Dr. Melissa Herrmann for possible Oxygen need at home.            Nicholas Ferrera, RN  Case Management [phone]

## 2021-06-08 NOTE — HOME CARE
Discharge order  noted for today. Received home health referral for [facility] for SN, PT, [address] and labs . Spoke with patient via phone ,patient identifier and demographics verified. Explained home care services and routines. Patient has the following DME: standard walker and cane; DME : W/C, shower chair and RW ordered from [facility] by ; [place] referral processed to [facility] central Intake . AMNA COOK. 15:30 pm- Received call from  Michael Jacobo) that patient can not get W/C,transport chair and a RW all together ,Maryann Gallegos states that patient will receive a RW and shower chair ( patient may have to pay out of pocket depending on insurance for shower chair). AMNA COOK.

## 2021-06-08 NOTE — ANESTHESIA POSTPROCEDURE EVALUATION
Procedure(s): FLEXIBLE BRONCHOSCOPY WITH BIOPSIES/NO C-ARM. MAC    Anesthesia Post Evaluation      Multimodal analgesia: multimodal analgesia used between 6 hours prior to anesthesia start to PACU discharge  Patient location during evaluation: bedside  Patient participation: complete - patient participated  Level of consciousness: awake  Pain management: adequate  Airway patency: patent  Anesthetic complications: no  Cardiovascular status: stable  Respiratory status: acceptable  Hydration status: acceptable  Post anesthesia nausea and vomiting:  controlled      INITIAL Post-op Vital signs:   Vitals Value Taken Time   /71 06/08/21 1332   Temp 36.2 °C (97.1 °F) 06/08/21 1332   Pulse 78 06/08/21 1335   Resp 19 06/08/21 1335   SpO2 96 % 06/08/21 1335   Vitals shown include unvalidated device data.

## 2021-06-08 NOTE — PROCEDURES
[facility]  Pulmonary, Critical Care, and Sleep Medicine    Name: Kathleen Lake MRN: [id number]   : 1955 Hospital: [address] [place] [address]   Date: 2021        Bronchoscopy Report    Procedure: Diagnostic bronchoscopy. Indication: Abnormal chest imaging, Mucus Plugging and Pneumonia    Consent/Treatment: Informed consent was obtained from the  patient after risks, benefits and alternatives were explained. Timeout verified the correct patient and correct procedure. Anesthesia:   Moderate conscious sedation performed by anesthesiology    Procedure Details:   -- The bronchoscope was introduced through the  right nare.    -- The vocal cords were found to be normal.  -- The trachea and betty were completely inspected and were found to be normal.  -- The right-sided endobronchial anatomy was completely inspected and was found to be normal except for markedly narrowed and distorted RUL opening with mucus plugs obstructing RUL apical and posterior segment openings- suctioned clear with multiple lavages , no endobronchial lesions noted  -- The left-sided endobronchial anatomy was completely inspected and was found to be normal.         Specimens:   Bronchial washings were sent for  microbiology, cytology, AFB smear and culture and fungal culture  The bronchoscope was wedged in the RUL and bronchoalveolar lavage was performed; material was sent for  microbiology, cytology, AFB smear and culture and fungal culture  Cytology brushings from RUL were sent    Rapid On-Site Evaluation: A preliminary diagnosis of Markedly narrowed and deformed RUL bronchus opening with mucus plugging    Complications: none    Estimated Blood Loss: Minimal    Do Schwab MD  2021  1:25 PM

## 2021-06-08 NOTE — PROGRESS NOTES
Discharge teaching completed with patient and spouse at bedside. Extensive time spent discussing medications. Opportunity provided for clarifying questions, all answered to patient and spouse's satisfaction. Patient to be transported home by spouse.

## 2021-06-08 NOTE — PROGRESS NOTES
Nurse attempted walking O2 on room. Patient 99% sitting, 98% sitting, patient only able to ambulate to room door due to BLE weakness, however patient sat 97% on room air.

## 2021-06-08 NOTE — INTERDISCIPLINARY ROUNDS
Interdisciplinary Rounds Note Interdisciplinary team rounds were held with the following team members Care Management, Nutrition, Physical Therapy and Physician Plan of care discussed. Patient Information: Kathleen Lake [phone] Attending Provider:  Oziel Lucia MD 
 
Admitting Diagnosis: Pneumonia [J18.9] Hospital day:  4 Expected LOS: 4d 19h Estimated Day of Discharge: 6/9/21 [place]/Lines/Catheters: no 
 
Telemetry Needed: yes Mobility Concerns:  yes [facility] (SNF) COVID Status: Negative Plan of care notes: none Vitaly Mcdonough

## 2021-06-08 NOTE — PROGRESS NOTES
WHEELCHAIR NECESSITY DOCUMENTATION      Patient _______________________________________ was evaluated on _________________ for a manual wheelchair for home use. Patient has a diagnosis(s) of: ______________________________________________________________________ that causes mobility limitations in the home that significantly impairs the ability to participate in mobility related activities of daily living (MRADLs) like-   Please check all that apply:  __ Toileting __Bathing __ Grooming __ Dressing __ Feeding      Patient has the following mobility condition/limitation that  Please check one:  __ Prevents the beneficiary from accomplishing MRADL entirely  __ Places the beneficiary at reasonably determined heightened risk of morbidity or mortality secondary to the attempts to perform MRADL  __ Prevents the beneficiary from completing an MRADL within a reasonable time frame      The Patients: (Please check all that apply)  __ Mobility limitation cannot be sufficiently resolved by the use of appropriately fitted cane or walker   __ Home provides adequate access between rooms, maneuvering space, and surfaces for use of the manual wheelchair that is provided  __ Use of a manual wheelchair will significantly improve the ability to participate in MRADLs and will be able to use it at home on a regular basis  __ Has not expressed an unwillingness to use the manual wheelchair in the home  __ Has sufficient upper extremity function and other physical and mental capabilities needed to safely self-propel the wheelchair that is provided in the home during a typical day. Limitations of strength, endurance and range of motion, or coordination, presence of pain, or deformity or absence of one or both upper extremities are relevant to the assessment of upper extremity function.    __ Has a caregiver who is available, willing and able to provide assistance with the wheelchair     _________ Height                _________ Weight    _______________________________                   ______________________  Jewel Sermon                                                         NPI

## 2021-06-08 NOTE — DISCHARGE INSTRUCTIONS
DISCHARGE SUMMARY from Nurse    PATIENT INSTRUCTIONS:    After general anesthesia or intravenous sedation, for 24 hours or while taking prescription Narcotics:  · Limit your activities  · Do not drive and operate hazardous machinery  · Do not make important personal or business decisions  · Do  not drink alcoholic beverages  · If you have not urinated within 8 hours after discharge, please contact your surgeon on call. Report the following to your surgeon:  · Excessive pain, swelling, redness or odor of or around the surgical area  · Temperature over 100.5  · Nausea and vomiting lasting longer than 4 hours or if unable to take medications  · Any signs of decreased circulation or nerve impairment to extremity: change in color, persistent  numbness, tingling, coldness or increase pain  · Any questions    What to do at Home:  Recommended activity: Activity as tolerated    If you experience any of the following symptoms chest pain, shortness of breath, nausea/vomiting, or fever, please follow up with Dr. Dustin Lobato. *  Please give a list of your current medications to your Primary Care Provider. *  Please update this list whenever your medications are discontinued, doses are      changed, or new medications (including over-the-counter products) are added. *  Please carry medication information at all times in case of emergency situations. These are general instructions for a healthy lifestyle:    No smoking/ No tobacco products/ Avoid exposure to second hand smoke  Surgeon General's Warning:  Quitting smoking now greatly reduces serious risk to your health.     Obesity, smoking, and sedentary lifestyle greatly increases your risk for illness    A healthy diet, regular physical exercise & weight monitoring are important for maintaining a healthy lifestyle    You may be retaining fluid if you have a history of heart failure or if you experience any of the following symptoms:  Weight gain of 3 pounds or more overnight or 5 pounds in a week, increased swelling in our hands or feet or shortness of breath while lying flat in bed. Please call your doctor as soon as you notice any of these symptoms; do not wait until your next office visit. Patient armband removed and shredded  MyChart Activation    Thank you for requesting access to [facility]. Please follow the instructions below to securely access and download your online medical record. [facility] allows you to send messages to your doctor, view your test results, renew your prescriptions, schedule appointments, and more. How Do I Sign Up? 1. In your internet browser, go to [email or web address]  2. Click on the First Time User? Click Here link in the Sign In box. You will be redirect to the New Member Sign Up page. 3. Enter your [facility] Access Code exactly as it appears below. You will not need to use this code after youve completed the sign-up process. If you do not sign up before the expiration date, you must request a new code. [facility] Access Code: [email or web address]  Expires: 2021 12:43 PM (This is the date your [facility] access code will )    4. Enter the last four digits of your Social Security Number (xxxx) and Date of Birth (mm/dd/yyyy) as indicated and click Submit. You will be taken to the next sign-up page. 5. Create a [facility] ID. This will be your [facility] login ID and cannot be changed, so think of one that is secure and easy to remember. 6. Create a [facility] password. You can change your password at any time. 7. Enter your Password Reset Question and Answer. This can be used at a later time if you forget your password. 8. Enter your e-mail address. You will receive e-mail notification when new information is available in [address] 9. Click Sign Up. You can now view and download portions of your medical record. 10. Click the Download Summary menu link to download a portable copy of your medical information.     Additional Information    If you have questions, please visit the Frequently Asked Questions section of the [facility] website at [email or web address] "[employer]. [email or web address] Remember, [facility] is NOT to be used for urgent needs. For medical emergencies, dial 911. The discharge information has been reviewed with the patient and spouse. The patient and spouse verbalized understanding. Discharge medications reviewed with the patient and spouse and appropriate educational materials and side effects teaching were provided.   ___________________________________________________________________________________________________________________________________

## 2021-06-08 NOTE — ROUTINE PROCESS
TRANSFER - IN REPORT: 
 
Verbal report received from [facility] [place] RN(name) on Susana Santana  being received from [facility](unit) for ordered procedure Report consisted of patients Situation, Background, Assessment and  
Recommendations(SBAR). Information from the following report(s) SBAR and Kardex was reviewed with the receiving nurse. Opportunity for questions and clarification was provided. Assessment completed upon patients arrival to unit and care assumed.

## 2021-06-08 NOTE — PROGRESS NOTES
Problem: Patient Education: Go to Patient Education Activity  Goal: Patient/Family Education  Outcome: Progressing Towards Goal     Problem: Pneumonia: Discharge Outcomes  Goal: *Demonstrates progressive activity  Outcome: Progressing Towards Goal  Goal: *Describes follow-up/return visits to physicians  Outcome: Progressing Towards Goal  Goal: *Tolerating diet  Outcome: Progressing Towards Goal  Goal: *Verbalizes name, dosage, time, side effects, and number of days to continue medications  Outcome: Progressing Towards Goal  Goal: *Influenza immunization  Outcome: Progressing Towards Goal  Goal: *Pneumococcal immunization  Outcome: Progressing Towards Goal  Goal: *Respiratory status at baseline  Outcome: Progressing Towards Goal  Goal: *Vital signs within defined limits  Outcome: Progressing Towards Goal  Goal: *Describes available resources and support systems  Outcome: Progressing Towards Goal  Goal: *Optimal pain control at patient's stated goal  Outcome: Progressing Towards Goal     Problem: Nutrition Deficit  Goal: *Optimize nutritional status  Outcome: Progressing Towards Goal     Problem: Risk for Spread of Infection  Goal: Prevent transmission of infectious organism to others  Description: Prevent the transmission of infectious organisms to other patients, staff members, and visitors. Outcome: Resolved/Met     Problem: Patient Education:  Go to Education Activity  Goal: Patient/Family Education  Outcome: Resolved/Met     Problem: Falls - Risk of  Goal: *Absence of Falls  Description: Document Allan Fall Risk and appropriate interventions in the flowsheet.   Outcome: Progressing Towards Goal  Note: Fall Risk Interventions:  Mobility Interventions: Bed/chair exit alarm, Patient to call before getting OOB         Medication Interventions: Bed/chair exit alarm, Patient to call before getting OOB    Elimination Interventions: Bed/chair exit alarm, Call light in reach, Patient to call for help with toileting needs, Stay With Me (per policy), Toilet paper/wipes in reach, Toileting schedule/hourly rounds, Urinal in reach    History of Falls Interventions: Bed/chair exit alarm, Investigate reason for fall, Room close to nurse's station         Problem: Patient Education: Go to Patient Education Activity  Goal: Patient/Family Education  Outcome: Progressing Towards Goal     Problem: Pain  Goal: *Control of Pain  Outcome: Progressing Towards Goal     Problem: Patient Education: Go to Patient Education Activity  Goal: Patient/Family Education  Outcome: Progressing Towards Goal     Problem: Pressure Injury - Risk of  Goal: *Prevention of pressure injury  Description: Document Raghav Scale and appropriate interventions in the flowsheet.   Outcome: Progressing Towards Goal  Note: Pressure Injury Interventions:  Sensory Interventions: Assess changes in LOC, Check visual cues for pain    Moisture Interventions: Absorbent underpads, Check for incontinence Q2 hours and as needed    Activity Interventions: Increase time out of bed, Pressure redistribution bed/mattress(bed type)    Mobility Interventions: HOB 30 degrees or less, Pressure redistribution bed/mattress (bed type)    Nutrition Interventions: Document food/fluid/supplement intake    Friction and Shear Interventions: HOB 30 degrees or less, Lift sheet                Problem: Patient Education: Go to Patient Education Activity  Goal: Patient/Family Education  Outcome: Progressing Towards Goal

## 2021-06-08 NOTE — PROGRESS NOTES
CM faxed DME orders for Wheelchair, Shower Chair, Elfredia Franklin, Transport Chair, with Medical Necessity form signed by Dr. Ingrid Mckeon with clinicals to [facility] [phone], with message for DME to be delivered to patient's home.            Cas Eisenberg RN  Case Management [phone]

## 2021-06-08 NOTE — PROGRESS NOTES
Problem: Mobility Impaired (Adult and Pediatric)  Goal: *Acute Goals and Plan of Care (Insert Text)  Description: Initiated 6/4/2021 and to be accomplished within 7 day(s)  1. Patient will move from supine to sit and sit to supine , scoot up and down, and roll side to side in bed with independence. 2.  Patient will transfer from bed to chair and chair to bed with minimal assistance/contact guard assist using the least restrictive device. 3.  Patient will perform sit to stand with minimal assistance/contact guard assist.  4.  Patient will ambulate with minimal assistance/contact guard assist for 25 feet with the least restrictive device. PLOF: Pt used SPC intermittently for household negotiation, had wife assist with medical transport and setting up WC (too cumbersome per pt report) for community negotiation     Outcome: Progressing Towards Goal   PHYSICAL THERAPY TREATMENT    Patient: Angela Parker (26 y.o. male)  Date: 6/8/2021  Diagnosis: Pneumonia [J18.9] <principal problem not specified>  Procedure(s) (LRB):  FLEXIBLE BRONCHOSCOPY WITH BIOPSIES/NO C-ARM (N/A) Day of Surgery  Precautions: Fall, Skin (spinal protective; compression deforminties T11/T12)  PLOF: see above    ASSESSMENT:  Pt continues to require max encouragement to participate and dictating what he will and will not do. Pt transferred mod I to sit edge of bed. Pt stood with SBA and marched in place approximately 10 times with CGA and hand on RW. Pt refused ambulation stating that he could walk just fine when he gets home. Pt was educated on using a rolling walker at home instead of the standard walker he has for energy conservation but he is unreceptive to this. Attempted further education however patient is not receptive to any of it. Pt was left in bed with needs in reach.   Progression toward goals:   []      Improving appropriately and progressing toward goals  [x]      Improving slowly and progressing toward goals  []      Not making progress toward goals and plan of care will be adjusted     PLAN:  Patient continues to benefit from skilled intervention to address the above impairments. Continue treatment per established plan of care. Discharge Recommendations:  Home Health  Further Equipment Recommendations for Discharge:  rolling walker and transport chair for community mobility as patient states his wife can't get the w/c he has in his car     SUBJECTIVE:   Patient stated I'm only going to stand, I won't do anything else.     OBJECTIVE DATA SUMMARY:   Critical Behavior:  Neurologic State: Alert, Agitated  Orientation Level: Oriented X4  Cognition: Appropriate decision making, Appropriate for age attention/concentration, Appropriate safety awareness, Follows commands  Safety/Judgement: Fall prevention  Functional Mobility Training:  Bed Mobility:  Rolling: Modified independent  Supine to Sit: Modified independent  Sit to Supine: Modified independent  Scooting: Modified independent  Transfers:  Sit to Stand: Stand-by assistance  Stand to Sit: Stand-by assistance     Balance:  Sitting: Intact  Standing: With support  Standing - Static: Fair  Standing - Dynamic :  (fair-)      Therapeutic Exercises:         EXERCISE   Sets   Reps   Active Active Assist   Passive Self ROM   Comments   Ankle Pumps    [] [] [] []    Quad Sets/Glut Sets    [] [] [] [] Hold for 5 secs   Hamstring Sets   [] [] [] []    Short Arc Quads   [] [] [] []    Heel Slides   [] [] [] []    Straight Leg Raises   [] [] [] []    Hip Add   [] [] [] [] Hold for 5 secs, w/ pillow squeeze   Long Arc Quads   [] [] [] []    Seated Marching   [] [] [] []    Standing Marching 1 10 [x] [] [] []       [] [] [] []        Pain:  Pain level pre-treatment: 0/10  Pain level post-treatment: 0/10   Pain Intervention(s): n/a    Activity Tolerance:   Poor+  Please refer to the flowsheet for vital signs taken during this treatment.   After treatment:   [] Patient left in no apparent distress sitting up in chair  [x] Patient left in no apparent distress in bed  [x] Call bell left within reach  [] Nursing notified  [] Caregiver present  [x] Bed alarm activated  [] SCDs applied      COMMUNICATION/EDUCATION:   [x]         Role of Physical Therapy in the acute care setting. [x]         Fall prevention education was provided and the patient/caregiver indicated understanding. []         Patient/family have participated as able in working toward goals and plan of care. []         Patient/family agree to work toward stated goals and plan of care. [x]         Patient understands intent and goals of therapy, but is neutral about his/her participation.   []         Patient is unable to participate in stated goals/plan of care: ongoing with therapy staff.  []         Other:        Kelly Hutchinson PT   Time Calculation: 24 mins

## 2021-06-08 NOTE — PROGRESS NOTES
CM spoke with patient, he is agreeable to home health as long as it's not long term. FOC verbal consent given for [facility]. Patient asked that all DME orders including Rolling Walker be delivered to the home. Patient said he has a walker without wheels he can use until delivered, he doesn't want anything extra to take home with him. CM called [facility] home health and spoke to [employer] and [address] [place] [address] said they could accept patient. CM put patient in the queue, for [facility]. Stacey Cancel Dr. Julio Cesar Collado about discharge date per [facility]. Dr. Julio Cesar Collado said, depends on Bronchoscopy. CM called [facility], and spoke to [employer] and updated her.              Manny Lezama, RN  Case Management [phone]

## 2021-06-08 NOTE — PROGRESS NOTES
[facility]  Pulmonary, Critical Care, and Sleep Medicine    Name: Montana Otero MRN: [id number]   : 1955 Hospital: [facility]   Date: 2021        IMPRESSION:   · RUL Cavitary pneumonia -likely chronic necrotizing infection, possible malignancy. No TB risk factors, ETOH abuse makes risk of anaerobic organisms. AFB smears x3 are negative  · Hyponatremia -chronic, likely SIADH 2/2 lung infection vs. Malignancy vs. Malnutrition  · Probable COPD -40+ pack year smoker, emphysema on CT. On home albuterol  · B/L Leg weakness  · Weight loss  · Tobacco Abuse- long term smoker >40 pack years     Patient Active Problem List   Diagnosis Code    GERD (gastroesophageal reflux disease) K21.9    Sinusitis J32.9    ED (erectile dysfunction) N52.9    DJD (degenerative joint disease) of knee M17.10    Fall from standing W19. Leanor Moan    Rib pain on left side R07.81    Pneumonia J18.9      PLAN:   · Proceed with bronchoscopy-evaluation of right upper lobe for malignancy and obtain diagnostic sampling to include biopsy if indicated    Discussed with patient- procedure explained with indications,  Alternatives to procedure,risks and expected complications from sedation, procedure including respiratory depression depression, bleeding, pneumothorax and possibility for additional interventions. Consents to procedure under MAC anesthesia-scheduled for 12:30 PM today  · Continue ABX: zosyn  · Continue PRN duonebs and mucinex. No need for steroids at this time.   · Bronchial hygiene protocol  · Smoking cessation, continue nicoderm patch  · Can discontinue AFB isolation  · Out patient testing- PFTs  · Assess home Oxygen needs at discharge  · OT, PT, OOB and ambulate  · FiO2 to keep SpO2 >=92%, HOB >=30 degree, aspiration precautions, aggressive pulmonary toileting, incentive spirometry.        Subjective/Interval History:     71-year-old cachectic male with past medical history of COPD, GERD, chronic compression fractures of T11/T12, and tobacco use admitted on 6/3/21 for RUL cavitary pneumonia. 21    · Pt has no new complaints today. Just feeling hungry and tired of waiting  · Explained bronchoscopy again and patient consents to proceed as discussed and scheduled  · Maintaining SpO2> 92% on RA. No complaints of worsening SOB, chest pain or cough  · No fevers, leukocytosis or hemoptysis  · AFB smears reported negative    ROS:A comprehensive review of systems was negative except for that written in the HPI. Objective:   Vital Signs:    Visit Vitals  BP (!) 145/89 (BP 1 Location: Left upper arm, BP Patient Position: Sitting)   Pulse 86   Temp 97.7 °F (36.5 °C)   Resp 20   Ht 6' (1.829 m)   Wt 50.8 kg (111 lb 15.9 oz)   SpO2 96%   BMI 15.19 kg/m²       O2 Device: None (Room air)       Temp (24hrs), Av.8 °F (36.6 °C), Min:97.6 °F (36.4 °C), Max:98.1 °F (36.7 °C)       Intake/Output:   Last shift:      No intake/output data recorded. Last 3 shifts:  1901 -  0700  In: 960 [P.O.:960]  Out: 1950 [Urine:1950]    Intake/Output Summary (Last 24 hours) at 2021 0710  Last data filed at 2021 1925  Gross per 24 hour   Intake 720 ml   Output 300 ml   Net 420 ml           Physical Exam:      General/Neurology: Alert, Awake, cachectic, in NAD   Head:               Normocephalic, without obvious abnormality  Eye:                 PERRL, EOM intact  Oral:                Mucus membranes moist  Lung:               B/l air entry fair. No rales. No rhonchi. No wheezing, decrease RUL sounds. Heart:              S1 S2 present  Abdomen:        Soft, non tender, BS+nt   Extremities:     No pedal edema.    Skin:                Dry, intact      DATA:  Labs:  Recent Labs     21  0324 21  0244   WBC 8.3 7.7   HGB 8.5* 8.0*   HCT 25.1* 24.2*    355     Recent Labs     21  0324 21  0244   * 131*   K 3.7 3.7    103   CO2 24 23   GLU 83 79   BUN 3* 5*   CREA 0.24* 0.22*   CA 7.7* 7.9*   MG 2.1  --    INR 1.1  --      No results for input(s): PH, PCO2, PO2, HCO3, FIO2 in the last 72 hours.     Delia Gaines MD  Pulmonary, Critical Care Medicine  [facility]

## 2021-06-08 NOTE — DISCHARGE SUMMARY
[address] [place] [address] Dr Iftikhar Randle [facility], [address]     DISCHARGE SUMMARY    Name: Leif Espinosa MRN: [id number]   Age / Sex: 61 y.o. / male CSN: [id number]   YOB: 1955 Length of Stay: 5 days   Admit Date: 6/3/2021 Discharge Date:        PRIMARY CARE PHYSICIAN: Ekta Gordon MD      DISCHARGE DIAGNOSES:    Right upper lobe pneumonia with cavitation  Likely underlying COPD  Tobacco abuse  Hyponatremia  Bilateral lower extremity weakness    CONSULTS CALLED:       PROCEDURES DONE: Bronchoscopy      COURSE IN THE HOSPITAL: This is a 77-year-old male with past medical history of COPD who presented to the ED with complaints of fatigue. Patient was evaluated and was admitted. Patient was noted to have some right upper lobe pneumonia with cavitation. Patient was started on antibiotics. Cultures were obtained. ID and pulmonary were consulted. AFB smears were requested. Regarding the bilateral lower extremity weakness spine surgery was consulted. PT OT were consulted. Patient was mobilized. Patient subsequently underwent a bronchoscopy. ID and pulmonary cleared the patient for discharge. Patient declined to go to a skilled nursing facility or rehab. Patient wished to go home. Discharge plans were discussed with the patient. Patient was counseled regarding compliance. Patient was counseled regarding smoking cessation. MEDICATIONS ON DISCHARGE:    Current Discharge Medication List      START taking these medications    Details   amoxicillin-clavulanate (AUGMENTIN) 875-125 mg per tablet Take 1 Tablet by mouth every twelve (12) hours for 21 days. Qty: 42 Tablet, Refills: 0  Start date: 6/8/2021, End date: 6/29/2021      guaiFENesin ER (MUCINEX) 600 mg ER tablet Take 1 Tablet by mouth every twelve (12) hours for 21 days.   Qty: 42 Tablet, Refills: 0  Start date: 6/8/2021, End date: 6/29/2021      L. acidophilus,casei,rhamnosus (BIO-K PLUS) 50 billion cell cpDR capsule Take 1 Capsule by mouth daily. Qty: 30 Capsule, Refills: 0  Start date: 6/9/2021      therapeutic multivitamin [facility]) tablet Take 1 Tablet by mouth daily. Qty: 30 Tablet, Refills: 0  Start date: 6/8/2021      albuterol (ProAir HFA) 90 mcg/actuation inhaler Take 2 Puffs by inhalation every four (4) hours as needed for Wheezing or Shortness of Breath. Qty: 1 Inhaler, Refills: 0  Start date: 6/8/2021      !! OTHER Incentive spirometryuse as directed  Qty: 1 Each, Refills: 0  Start date: 6/8/2021      !! OTHER Check a CBC, CMP, magnesium in 4 days. Results to PCP immediately. Diagnosispneumonia  Qty: 1 Each, Refills: 0  Start date: 6/8/2021       !! - Potential duplicate medications found. Please discuss with provider. CONTINUE these medications which have NOT CHANGED    Details   fluticasone propion-salmeteroL (ADVAIR/WIXELA) 250-50 mcg/dose diskus inhaler Take 1 Puff by inhalation every twelve (12) hours. Qty: 1 Inhaler, Refills: 3         STOP taking these medications       Calcium Carbonate-Vit D3-Min 600 mg calcium- 400 unit tab Comments:   Reason for Stopping:         DULoxetine (CYMBALTA) 20 mg capsule Comments:   Reason for Stopping:         aspirin 81 mg chewable tablet Comments:   Reason for Stopping:                 DISCHARGE VITAL SIGNS:  Visit Vitals  /71   Pulse 85   Temp 97.1 °F (36.2 °C)   Resp 19   Ht 6' (1.829 m)   Wt 52.2 kg (115 lb)   SpO2 97%   BMI 15.60 kg/m²         CONDITION ON DISCHARGE: Stable. DISPOSITION: Home      FOLLOW-UP RECOMMENDATIONS:   Follow-up Information     Follow up With Specialties Details Why Contact Info    Jackelin Roman MD Internal Medicine   [place] [address]  [phone]            OTHER INSTRUCTIONS:        TIME SPENT ON DISCHARGE ACTIVITIES: More than 35 minutes. Dragon medical dictation software was used for portions of this report. Unintended errors may occur.       Signed:  María Brown MD      6/8/2021

## 2021-06-08 NOTE — PROGRESS NOTES
Infectious Disease progress Note        Reason: RUL cavitary pneumonia    Current abx Prior abx   Piperacillin/tazobactam since 6/3/2021 levofloxacin, vancomycin 6/3-6/4     Lines:       Assessment :     61 y.o.  male with hx of DJD, COPD, chronic compression fractures of T11/T12 vertebrae, current tobacco abuse who presented to the ED on 6/3/2021 with acute onset BLE weakness.      CTA chest negative for PE, consolidation and right upper lobe with multiple smaller pockets, developing aggressive infectious process suspected, enlarged mediastinal nodes, chronic anterior wedging deformities at T3, T11 and T12. Clinical presentation consistent with chronic necrotizing pneumonia right upper lung-exact etiology not entirely clear at this time. Differential diagnosis includes: Malignancy/indolent infection chronic silent aspiration pneumonia    Lack of high-grade fever, lack of leukocytosis, duration of symptoms argues against atypical community-acquired pneumonia, MRSA pneumonia. Lack of night sweats, fevers argues against tuberculosis. Negative sputum AFB x3 on 6/4/21, 6/5, 6/6    Pulmonary follow-up appreciated. s/p bronchoscopy 6/7/21- markedly narrowed and distorted RUL opening with mucus plugs obstructing RUL apical and posterior segment openings- suctioned clear with multiple lavages , no endobronchial lesions noted  Bronchoscopy findings concerning for chronic necrotizing pneumonia/aspiration pneumonia      Recommendations:    1. Discontinue piperacillin/tazobactam.  Start p.o. Augmentin for 3 weeks  2. probiotics while on antibiotics  3. Follow-up with pulmonary as outpatient for cytology results       Above plan was discussed in details with patient,dr. aleman. Please call me if any further questions or concerns. Will continue to participate in the care of this patient. HPI:     Improved cough. Minimal phlegm.   Carlson Man to go home    home Medication List    Details   fluticasone propion-salmeteroL (ADVAIR/WIXELA) 250-50 mcg/dose diskus inhaler Take 1 Puff by inhalation every twelve (12) hours. Qty: 1 Inhaler, Refills: 3      Calcium Carbonate-Vit D3-Min 600 mg calcium- 400 unit tab Take 1 Tab by mouth two (2) times a day. With full glass of water  Qty: 60 Tab, Refills: 5      DULoxetine (CYMBALTA) 20 mg capsule 1 tab po in the morning with food  Qty: 30 Cap, Refills: 1      aspirin 81 mg chewable tablet Take 1 Tab by mouth daily.   Qty: 1 Tab, Refills: 0             Current Facility-Administered Medications   Medication Dose Route Frequency    0.9% sodium chloride infusion  25 mL/hr IntraVENous CONTINUOUS    sodium chloride (NS) flush 5-40 mL  5-40 mL IntraVENous Q8H    sodium chloride (NS) flush 5-40 mL  5-40 mL IntraVENous PRN    lidocaine (XYLOCAINE) 2 % jelly   Topical ONCE    lidocaine (PF) (XYLOCAINE) 10 mg/mL (1 %) injection 2 mL  2 mL Other ONCE    lidocaine (XYLOCAINE) 4 % (40 mg/mL) topical solution   Nebulization ONCE    piperacillin-tazobactam (ZOSYN) 3.375 g in 0.9% sodium chloride (MBP/ADV) 100 mL MBP  3.375 g IntraVENous Q6H    albuterol-ipratropium (DUO-NEB) 2.5 MG-0.5 MG/3 ML  3 mL Nebulization Q6H PRN    heparin (porcine) injection 5,000 Units  5,000 Units SubCUTAneous Q8H    melatonin tablet 3 mg  3 mg Oral QHS    thiamine HCL (B-1) tablet 100 mg  100 mg Oral DAILY    nicotine (NICODERM CQ) 14 mg/24 hr patch 1 Patch  1 Patch TransDERmal DAILY    sodium chloride (NS) flush 5-10 mL  5-10 mL IntraVENous PRN    ondansetron (ZOFRAN) injection 4 mg  4 mg IntraVENous Q4H PRN    acetaminophen (TYLENOL) tablet 650 mg  650 mg Oral Q6H PRN    Or    acetaminophen (TYLENOL) suppository 650 mg  650 mg Rectal Q6H PRN    polyethylene glycol (MIRALAX) packet 17 g  17 g Oral DAILY PRN    ondansetron (ZOFRAN ODT) tablet 4 mg  4 mg Oral Q8H PRN    sodium chloride (NS) flush 5-40 mL  5-40 mL IntraVENous Q8H    therapeutic multivitamin (THERAGRAN) tablet 1 Tablet  1 Tablet Oral DAILY    guaiFENesin ER (MUCINEX) tablet 600 mg  600 mg Oral Q12H       Allergies: Niacin    Temp (24hrs), Av.9 °F (36.6 °C), Min:97.7 °F (36.5 °C), Max:98.1 °F (36.7 °C)    Visit Vitals  /78 (BP 1 Location: Right upper arm, BP Patient Position: Sitting)   Pulse 81   Temp 98 °F (36.7 °C)   Resp 20   Ht 6' (1.829 m)   Wt 50.8 kg (111 lb 15.9 oz)   SpO2 92%   BMI 15.19 kg/m²       ROS: 12 point ROS obtained in details. Pertinent positives as mentioned in HPI,   otherwise negative    Physical Exam:    General:  Alert, cooperative, no distress   Head:  Normocephalic, without obvious abnormality, atraumatic. Eyes:  Conjunctivae/corneas clear. ANicteric   Lungs:   Bilateral chest movements equal, absent RUL BS, decreased B/L, no wheezing or rales. Chest wall:  No tenderness or deformity. Heart:  Regular rate and rhythm, S1, S2 normal, no  click, rub or gallop. Abdomen:   Soft, non-tender. Bowel sounds normal. No masses,  No organomegaly. Extremities: normal, atraumatic, no cyanosis or edema. Neurologic: Grossly nonfocal  Skin: no rash or ulcers  Psychiatry: appropriate mood and affect  Back: no spinal/paraspinal muscle tenderness or rigidity                                  Labs: Results:   Chemistry Recent Labs     21  0324 21  0244   GLU 83 79   * 131*   K 3.7 3.7    103   CO2 24 23   BUN 3* 5*   CREA 0.24* 0.22*   CA 7.7* 7.9*   AGAP 7 5   BUCR 13 23*      CBC w/Diff Recent Labs     21  0324 21  0244   WBC 8.3 7.7   RBC 2.76* 2.63*   HGB 8.5* 8.0*   HCT 25.1* 24.2*    355   GRANS 70 74*   LYMPH 20* 20*   EOS 3 2      Microbiology No results for input(s): CULT in the last 72 hours.        RADIOLOGY:    All available imaging studies/reports in [facility] for this admission were reviewed      Disclaimer: Sections of this note are dictated utilizing voice recognition software, which may have resulted in some phonetic based errors in grammar and contents. Even though attempts were made to correct all the mistakes, some may have been missed, and remained in the body of the document. If questions arise, please contact our department.     Dr. Amelia Navas, Infectious Disease Specialist  [phone]  June 8, 2021  12:22 PM

## 2021-06-08 NOTE — PROGRESS NOTES
19**: Assumed patient care from [address]. Patient is alert and oriented to person, place, time and situation. Respiratory status is stable on room air. Vital signs are stable. MEWS score is a one. Patient denies any pain, discomfort, nausea vomiting dizziness or anxiety. White board and fall card is updated. Bed is locked and in lowest position. Call bell, water and personal belongings are within reach. Patient has no questions, comments or concerns after bedside shift report. 0700: Patient had an uneventful shift. Respiratory status, vital signs and MEWS score remained stable. Patient was resting quietly with no signs of distress noted. Bed locked and in lowest position. Call bell water and personal belongings were within reach. Patient  had no questions, comments or concerns after bedside shift report.  Bedside report given to Sachin Luz R.N.

## 2021-06-08 NOTE — PROGRESS NOTES
Discharge order noted for today. Pt has been accepted to [facility]. Spoke with patient and he is agreeable to the transition plan today. Transport has been arranged through patient's wife. Patient's  home health  orders have been forwarded to [facility] home health  agency via [address] Updated bedside RN, Emily Galloway,  to the transition plan.   Discharge information has been documented on the AVS.         Vianca Ortez RN  Case Management [phone]

## 2021-06-08 NOTE — ANESTHESIA PREPROCEDURE EVALUATION
Relevant Problems   No relevant active problems       Anesthetic History   No history of anesthetic complications            Review of Systems / Medical History  Patient summary reviewed and pertinent labs reviewed    Pulmonary    COPD               Neuro/Psych   Within defined limits           Cardiovascular                  Exercise tolerance: <4 METS     GI/Hepatic/Renal     GERD           Endo/Other        Arthritis     Other Findings              Physical Exam    Airway  Mallampati: III  TM Distance: 4 - 6 cm  Neck ROM: decreased range of motion   Mouth opening: Normal     Cardiovascular    Rhythm: regular  Rate: normal         Dental    Dentition: Poor dentition     Pulmonary  Breath sounds clear to auscultation               Abdominal  GI exam deferred       Other Findings            Anesthetic Plan    ASA: 3  Anesthesia type: MAC            Anesthetic plan and risks discussed with: Patient

## 2021-06-08 NOTE — PROGRESS NOTES
Nurse consulted with Dr. Magda Beavers pertaining to patient medications. Nurse informed Dr. Magda Beavers the patient missed his morning medications due to the patient being off the floor for a procedure. Per Dr. Alecia Islas the patient his dose of Zosyn and consult with the patient about the nicotine patch. \"   Patient refused nicotine patch.  Nurse administered Zosyn per MD.

## 2021-06-08 NOTE — PROGRESS NOTES
Problem: Patient Education: Go to Patient Education Activity  Goal: Patient/Family Education  6/8/2021 1603 by Christa Sanders  Outcome: Resolved/Met  6/8/2021 1432 by Christa Sanders  Outcome: Progressing Towards Goal     Problem: Pneumonia: Day 4  Goal: Off Pathway (Use only if patient is Off Pathway)  Outcome: Resolved/Met  Goal: Activity/Safety  Outcome: Resolved/Met  Goal: Nutrition/Diet  Outcome: Resolved/Met  Goal: Discharge Planning  Outcome: Resolved/Met  Goal: Medications  Outcome: Resolved/Met  Goal: Respiratory  Outcome: Resolved/Met  Goal: Treatments/Interventions/Procedures  Outcome: Resolved/Met  Goal: Psychosocial  Outcome: Resolved/Met     Problem: Pneumonia: Discharge Outcomes  Goal: *Demonstrates progressive activity  6/8/2021 1603 by Christa Sanders  Outcome: Resolved/Met  6/8/2021 1432 by Christa Sanders  Outcome: Progressing Towards Goal  Goal: *Describes follow-up/return visits to physicians  6/8/2021 1603 by Christa Sanders  Outcome: Resolved/Met  6/8/2021 1432 by Christa Sanders  Outcome: Progressing Towards Goal  Goal: *Tolerating diet  6/8/2021 1603 by Christa Sanders  Outcome: Resolved/Met  6/8/2021 1432 by Christa Sanders  Outcome: Progressing Towards Goal  Goal: *Verbalizes name, dosage, time, side effects, and number of days to continue medications  6/8/2021 1603 by Christa Sanders  Outcome: Resolved/Met  6/8/2021 1432 by Christa Sanders  Outcome: Progressing Towards Goal  Goal: *Influenza immunization  6/8/2021 1603 by Christa Sanders  Outcome: Resolved/Met  6/8/2021 1432 by Christa Sanders  Outcome: Progressing Towards Goal  Goal: *Pneumococcal immunization  6/8/2021 1603 by Christa Sanders  Outcome: Resolved/Met  6/8/2021 1432 by Christa Sanders  Outcome: Progressing Towards Goal  Goal: *Respiratory status at baseline  6/8/2021 1603 by Christa Sanders  Outcome: Resolved/Met  6/8/2021 1432 by Christa Sanders  Outcome: Progressing Towards Goal  Goal: *Vital signs within defined limits  6/8/2021 1603 by [place] Escort  Outcome: Resolved/Met  6/8/2021 1432 by [place] [employer]  Outcome: Progressing Towards Goal  Goal: *Describes available resources and support systems  6/8/2021 1603 by Vinay Escort  Outcome: Resolved/Met  6/8/2021 1432 by [place] Escort  Outcome: Progressing Towards Goal  Goal: *Optimal pain control at patient's stated goal  6/8/2021 1603 by Vinay Escort  Outcome: Resolved/Met  6/8/2021 1432 by [place] Escort  Outcome: Progressing Towards Goal     Problem: Pneumonia: Discharge Outcomes  Goal: *Demonstrates progressive activity  6/8/2021 1603 by Vinay Escort  Outcome: Resolved/Met  6/8/2021 1432 by [place] Escort  Outcome: Progressing Towards Goal  Goal: *Describes follow-up/return visits to physicians  6/8/2021 1603 by [place] Escort  Outcome: Resolved/Met  6/8/2021 1432 by [place] Escort  Outcome: Progressing Towards Goal  Goal: *Tolerating diet  6/8/2021 1603 by [place] Escort  Outcome: Resolved/Met  6/8/2021 1432 by Vinay Escort  Outcome: Progressing Towards Goal  Goal: *Verbalizes name, dosage, time, side effects, and number of days to continue medications  6/8/2021 1603 by Vinay Escort  Outcome: Resolved/Met  6/8/2021 1432 by [place] Escort  Outcome: Progressing Towards Goal  Goal: *Influenza immunization  6/8/2021 1603 by Vinay Escort  Outcome: Resolved/Met  6/8/2021 1432 by [place] Escort  Outcome: Progressing Towards Goal  Goal: *Pneumococcal immunization  6/8/2021 1603 by Vinay Escort  Outcome: Resolved/Met  6/8/2021 1432 by Vinay Escort  Outcome: Progressing Towards Goal  Goal: *Respiratory status at baseline  6/8/2021 1603 by [place] Escort  Outcome: Resolved/Met  6/8/2021 1432 by [place] Escort  Outcome: Progressing Towards Goal  Goal: *Vital signs within defined limits  6/8/2021 1603 by Jessica DEL VALLE  Outcome: Resolved/Met  6/8/2021 1432 by Kimberly Powell  Outcome: Progressing Towards Goal  Goal: *Describes available resources and support systems  6/8/2021 1603 by Kimberly Powell  Outcome: Resolved/Met  6/8/2021 1432 by Kimberly Powell  Outcome: Progressing Towards Goal  Goal: *Optimal pain control at patient's stated goal  6/8/2021 1603 by Kimberly Powell  Outcome: Resolved/Met  6/8/2021 1432 by Kimberly Powell  Outcome: Progressing Towards Goal     Problem: Patient Education: Go to Patient Education Activity  Goal: Patient/Family Education  Outcome: Resolved/Met     Problem: Patient Education: Go to Patient Education Activity  Goal: Patient/Family Education  Outcome: Resolved/Met     Problem: Nutrition Deficit  Goal: *Optimize nutritional status  6/8/2021 1603 by Kimberly Powell  Outcome: Resolved/Met  6/8/2021 1432 by Kimberly Powell  Outcome: Progressing Towards Goal     Problem: Risk for Spread of Infection  Goal: Prevent transmission of infectious organism to others  Description: Prevent the transmission of infectious organisms to other patients, staff members, and visitors. Outcome: Resolved/Met     Problem: Patient Education:  Go to Education Activity  Goal: Patient/Family Education  Outcome: Resolved/Met     Problem: Falls - Risk of  Goal: *Absence of Falls  Description: Document Allan Fall Risk and appropriate interventions in the flowsheet.   6/8/2021 1603 by Kimberly Powell  Outcome: Resolved/Met  6/8/2021 1432 by Kimberly Powell  Outcome: Progressing Towards Goal  Note: Fall Risk Interventions:  Mobility Interventions: Bed/chair exit alarm, Patient to call before getting OOB         Medication Interventions: Bed/chair exit alarm, Patient to call before getting OOB    Elimination Interventions: Bed/chair exit alarm, Call light in reach, Patient to call for help with toileting needs, Stay With Me (per policy), Toilet paper/wipes in reach, Toileting schedule/hourly rounds, Urinal in reach    History of Falls Interventions: Bed/chair exit alarm, Investigate reason for fall, Room close to nurse's station         Problem: Patient Education: Go to Patient Education Activity  Goal: Patient/Family Education  6/8/2021 1603 by Luke Officer  Outcome: Resolved/Met  6/8/2021 1432 by Luke Officer  Outcome: Progressing Towards Goal     Problem: Pain  Goal: *Control of Pain  6/8/2021 1603 by Luke Officer  Outcome: Resolved/Met  6/8/2021 1432 by Luke Officer  Outcome: Progressing Towards Goal     Problem: Patient Education: Go to Patient Education Activity  Goal: Patient/Family Education  6/8/2021 1603 by Luke Officer  Outcome: Resolved/Met  6/8/2021 1432 by Luke Officer  Outcome: Progressing Towards Goal     Problem: Pressure Injury - Risk of  Goal: *Prevention of pressure injury  Description: Document Raghav Scale and appropriate interventions in the flowsheet.   6/8/2021 1603 by Luke Officer  Outcome: Resolved/Met  6/8/2021 1432 by Luke Officer  Outcome: Progressing Towards Goal  Note: Pressure Injury Interventions:  Sensory Interventions: Assess changes in LOC, Check visual cues for pain    Moisture Interventions: Absorbent underpads, Check for incontinence Q2 hours and as needed    Activity Interventions: Increase time out of bed, Pressure redistribution bed/mattress(bed type)    Mobility Interventions: HOB 30 degrees or less, Pressure redistribution bed/mattress (bed type)    Nutrition Interventions: Document food/fluid/supplement intake    Friction and Shear Interventions: HOB 30 degrees or less, Lift sheet                Problem: Patient Education: Go to Patient Education Activity  Goal: Patient/Family Education  6/8/2021 1603 by Luke Officer  Outcome: Resolved/Met  6/8/2021 1432 by Jose Eduardo DEL VALLE  Outcome: Progressing Towards Goal

## 2021-06-08 NOTE — PROGRESS NOTES
Walk Test performed on patient by Amy Regalado RN, no Oxygen needed.          Hermilo Vidal, RN  Case Management [phone]

## 2021-06-08 NOTE — PERIOP NOTES
TRANSFER - OUT REPORT:    Verbal report given to LUCIEN Degroot(name) on Isadore Hammans  being transferred to (unit) for routine post - op       Report consisted of patients Situation, Background, Assessment and   Recommendations(SBAR). Information from the following report(s) SBAR, Kardex, Procedure Summary, Intake/Output, MAR and Recent Results was reviewed with the receiving nurse. Lines:   Peripheral IV 06/03/21 Anterior;Right Forearm (Active)   Site Assessment Clean, dry, & intact 06/08/21 1313   Phlebitis Assessment 0 06/08/21 1313   Infiltration Assessment 0 06/08/21 1313   Dressing Status Clean, dry, & intact 06/08/21 1313   Dressing Type Tape;Transparent 06/08/21 1313   Hub Color/Line Status Blue 06/08/21 1313   Action Taken Open ports on tubing capped 06/07/21 1925   Alcohol Cap Used Yes 06/07/21 1925       Peripheral IV 06/08/21 Left;Posterior Forearm (Active)   Site Assessment Clean, dry, & intact 06/08/21 1313   Phlebitis Assessment 0 06/08/21 1313   Infiltration Assessment 0 06/08/21 1313   Dressing Status Clean, dry, & intact 06/08/21 1313   Dressing Type Tape;Transparent 06/08/21 1313   Hub Color/Line Status Pink 06/08/21 1313      Visit Vitals  /70 (BP 1 Location: Right upper arm, BP Patient Position: At rest)   Pulse 88   Temp 97.1 °F (36.2 °C)   Resp 19   Ht 6' (1.829 m)   Wt 52.2 kg (115 lb)   SpO2 100%   BMI 15.60 kg/m²       Opportunity for questions and clarification was provided.       Patient transported with:   [facility]

## 2021-06-09 ENCOUNTER — HOME CARE VISIT (OUTPATIENT)
Dept: HOME HEALTH SERVICES | Facility: HOME HEALTH | Age: 66
End: 2021-06-09

## 2021-06-09 LAB
BACTERIA SPEC CULT: NORMAL
BACTERIA SPEC CULT: NORMAL
SERVICE CMNT-IMP: NORMAL
SERVICE CMNT-IMP: NORMAL

## 2021-06-10 LAB
BACTERIA SPEC CULT: ABNORMAL
BACTERIA SPEC CULT: ABNORMAL
GRAM STN SPEC: ABNORMAL
SERVICE CMNT-IMP: ABNORMAL

## 2021-06-14 LAB
BACTERIA SPEC CULT: ABNORMAL
SERVICE CMNT-IMP: ABNORMAL

## 2021-06-28 ENCOUNTER — TELEPHONE (OUTPATIENT)
Dept: INTERNAL MEDICINE CLINIC | Age: 66
End: 2021-06-28

## 2021-06-28 DIAGNOSIS — J41.1 MUCOPURULENT CHRONIC BRONCHITIS (HCC): Primary | ICD-10-CM

## 2021-06-28 NOTE — TELEPHONE ENCOUNTER
Patient states he has Lung Biopsy done at the hospital, and does not currently have a lung specialist.

## 2021-06-28 NOTE — TELEPHONE ENCOUNTER
Patient is aware biopsy showed mucus plugs due to pneumonia. Patient is aware he will f/u with pulmonary Dr. Ricci Slade.

## 2021-07-06 ENCOUNTER — TELEPHONE (OUTPATIENT)
Dept: INTERNAL MEDICINE CLINIC | Age: 66
End: 2021-07-06

## 2021-07-06 NOTE — TELEPHONE ENCOUNTER
Pt calling back, says he should have said that he needs a small wheelchair. Not a full blown one. Says he needs small one he can get around in on his own and be able to use his feet to move and be able to get in and out of car.

## 2021-07-06 NOTE — TELEPHONE ENCOUNTER
Pt called stating his insurance requires him to get a written prescription for a wheelchair he does not have a name of the medical supply store he is going to use

## 2021-07-07 NOTE — TELEPHONE ENCOUNTER
Pt called asking for the wheelchair order to be sent to [facility]  Phone# [phone] Fax# [facility] - Phone [phone] Fax# [phone]

## 2021-07-08 NOTE — TELEPHONE ENCOUNTER
Pt calling again. Says it is called a transport chair and he does want it to go to [facility].    Asking for call from nurse when faxed. Says he has been calling for 3 days with no return call.

## 2021-07-16 LAB
ACID FAST STN SPEC: NEGATIVE
AMIKACIN ISLT MIC: ABNORMAL
CEFOXITIN ISLT MIC: ABNORMAL
CIPROFLOXACIN ISLT MIC: ABNORMAL
CLARITHRO ISLT MIC: ABNORMAL
DOXYCYCLINE ISLT MIC: ABNORMAL
IMIPENEM ISLT MIC: ABNORMAL
LINEZOLID ISLT MIC: ABNORMAL
M AVIUM CMPLX RRNA SPEC QL PROBE: NEGATIVE
M GORDONAE RRNA SPEC QL PROBE: NORMAL
M KANSASII RRNA SPEC QL PROBE: NORMAL
M TB CMPLX RRNA SPEC QL PROBE: NEGATIVE
MINOCYCLINE ISLT MIC: ABNORMAL
MOXIFLOXACIN ISLT MIC: ABNORMAL
MYCOBACTERIUM SPEC QL CULT: POSITIVE
ORGANISM, AFS30: ABNORMAL
OTHER MICROORG DNA SPEC NAA+PROBE: ABNORMAL
OTHER, RAFBI6: NORMAL
PLEASE NOTE, 182860: ABNORMAL
PLEASE NOTE, AFR87: ABNORMAL
SOURCE, RSRC5: ABNORMAL
SPECIMEN PREPARATION: ABNORMAL
SPECIMEN SOURCE: ABNORMAL
SPECIMEN SOURCE: ABNORMAL
SPECIMEN SOURCE: NORMAL
SUSCEPTIBILITY TESTING, RSUS1T: ABNORMAL
TIGECYCLINE ISLT MIC: ABNORMAL
TMP SMX ISLT MIC: ABNORMAL
TOBRAMYCIN ISLT MIC: ABNORMAL

## 2021-07-19 ENCOUNTER — VIRTUAL VISIT (OUTPATIENT)
Dept: INTERNAL MEDICINE CLINIC | Age: 66
End: 2021-07-19
Payer: COMMERCIAL

## 2021-07-19 DIAGNOSIS — M54.41 CHRONIC MIDLINE LOW BACK PAIN WITH BILATERAL SCIATICA: ICD-10-CM

## 2021-07-19 DIAGNOSIS — E44.0 MODERATE PROTEIN-CALORIE MALNUTRITION (HCC): ICD-10-CM

## 2021-07-19 DIAGNOSIS — J41.1 MUCOPURULENT CHRONIC BRONCHITIS (HCC): Primary | ICD-10-CM

## 2021-07-19 DIAGNOSIS — J18.9 PNEUMONIA OF RIGHT UPPER LOBE DUE TO INFECTIOUS ORGANISM: ICD-10-CM

## 2021-07-19 DIAGNOSIS — G89.29 CHRONIC MIDLINE LOW BACK PAIN WITH BILATERAL SCIATICA: ICD-10-CM

## 2021-07-19 DIAGNOSIS — M48.56XD NON-TRAUMATIC COMPRESSION FRACTURE OF L4 LUMBAR VERTEBRA WITH ROUTINE HEALING, SUBSEQUENT ENCOUNTER: ICD-10-CM

## 2021-07-19 DIAGNOSIS — M54.42 CHRONIC MIDLINE LOW BACK PAIN WITH BILATERAL SCIATICA: ICD-10-CM

## 2021-07-19 PROCEDURE — 99442 PR PHYS/QHP TELEPHONE EVALUATION 11-20 MIN: CPT | Performed by: INTERNAL MEDICINE

## 2021-07-19 NOTE — PROGRESS NOTES
Maico Baeza is a 61 y.o. male, evaluated via audio-only technology on 7/19/2021 for Back Pain, COPD, and Pneumonia  . Assessment & Plan:   Diagnoses and all orders for this visit:    1. Mucopurulent chronic bronchitis ([address]    2. Pneumonia of right upper lobe due to infectious organism    3. Chronic midline low back pain with bilateral sciatica    4. Moderate protein-calorie malnutrition ([address]    5. Non-traumatic compression fracture of L4 lumbar vertebra with routine healing, subsequent encounter        12  Subjective:   Patient was recently hospitalized for right upper lobe pneumonia with cavitation. Patient was treated with IV antibiotics and discharged on Augmentin. 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 Patient has a history of COPD and heavy alcohol use and is underweight with BMI of 15. Patient says that he is currently eating better but continues to smoke cigarettes. Overall he is doing better however he is still weak and having difficulty walking and has had multiple falls at home. He has severe degenerative joint disease of his whole spine multiple compression fractures in his lumbar and thoracic spine. This has led to some chronic pain as well as difficulty walking. Patient unable to use a walker and would benefit from transport wheelchair for travel. Patient has poor history of follow-up. He should follow-up with pulmonary to confirm that his pneumonia and CT of his chest has improved after treatment. . Patient currently not using Advair and not complaining of any unusual shortness of breath especially since he is not very active. Prior to Admission medications    Medication Sig Start Date End Date Taking? Authorizing Provider   L. acidophilus,casei,rhamnosus (BIO-K PLUS) 50 billion cell cpDR capsule Take 1 Capsule by mouth daily.  6/9/21   Ty Ca MD   therapeutic multivitamin [facility]) tablet Take 1 Tablet by mouth daily. 6/8/21   Ivonne Livingston MD   albuterol (ProAir HFA) 90 mcg/actuation inhaler Take 2 Puffs by inhalation every four (4) hours as needed for Wheezing or Shortness of Breath. 6/8/21   Ivonne Livingston MD   OTHER Incentive spirometryuse as directed 6/8/21   Ivonne Livingston MD   OTHER Check a CBC, CMP, magnesium in 4 days. Results to PCP immediately. Diagnosispneumonia 6/8/21   Ivonne Livingston MD   fluticasone propion-salmeteroL (ADVAIR/WIXELA) 250-50 mcg/dose diskus inhaler Take 1 Puff by inhalation every twelve (12) hours. 4/27/21   Lizet Zhang MD     Patient Active Problem List   Diagnosis Code    GERD (gastroesophageal reflux disease) K21.9    Sinusitis J32.9    ED (erectile dysfunction) N52.9    DJD (degenerative joint disease) of knee M17.10    Fall from standing W19. Katie Agapito    Rib pain on left side R07.81    Pneumonia J18.9       ROS    No flowsheet data found. Sayda Eaton, who was evaluated through a patient-initiated, synchronous (real-time) audio only encounter, and/or her healthcare decision maker, is aware that it is a billable service, with coverage as determined by his insurance carrier. He provided verbal consent to proceed: Yes. He has not had a related appointment within my department in the past 7 days or scheduled within the next 24 hours.       Total Time: minutes: 11-20 minutes    Yoni aGn MD

## 2021-07-20 LAB
ACID FAST STN SPEC: NEGATIVE
ACID FAST STN SPEC: NEGATIVE
MYCOBACTERIUM SPEC QL CULT: NEGATIVE
MYCOBACTERIUM SPEC QL CULT: NEGATIVE
SPECIMEN PREPARATION: NORMAL
SPECIMEN PREPARATION: NORMAL
SPECIMEN SOURCE: NORMAL
SPECIMEN SOURCE: NORMAL

## 2021-07-21 ENCOUNTER — TELEPHONE (OUTPATIENT)
Dept: INTERNAL MEDICINE CLINIC | Age: 66
End: 2021-07-21

## 2021-07-26 LAB
ACID FAST STN SPEC: NEGATIVE
MYCOBACTERIUM SPEC QL CULT: NEGATIVE
SPECIMEN PREPARATION: NORMAL
SPECIMEN SOURCE: NORMAL

## 2021-11-16 ENCOUNTER — OFFICE VISIT (OUTPATIENT)
Dept: PULMONOLOGY | Age: 66
End: 2021-11-16
Payer: COMMERCIAL

## 2021-11-16 VITALS — HEIGHT: 72 IN | BODY MASS INDEX: 15.6 KG/M2

## 2021-11-16 DIAGNOSIS — R06.02 SOB (SHORTNESS OF BREATH) ON EXERTION: ICD-10-CM

## 2021-11-16 DIAGNOSIS — A31.9 MYCOBACTERIAL INFECTION, NON-TB: Primary | ICD-10-CM

## 2021-11-16 DIAGNOSIS — Z23 NEEDS FLU SHOT: ICD-10-CM

## 2021-11-16 DIAGNOSIS — J98.4 CAVITARY LUNG DISEASE: ICD-10-CM

## 2021-11-16 DIAGNOSIS — J44.9 COPD, GROUP D, BY GOLD 2017 CLASSIFICATION (HCC): ICD-10-CM

## 2021-11-16 PROBLEM — Z87.891 PERSONAL HISTORY OF TOBACCO USE: Status: ACTIVE | Noted: 2021-11-16

## 2021-11-16 PROBLEM — J42 CHRONIC BRONCHITIS (HCC): Status: ACTIVE | Noted: 2021-11-16

## 2021-11-16 PROCEDURE — 94727 GAS DIL/WSHOT DETER LNG VOL: CPT | Performed by: INTERNAL MEDICINE

## 2021-11-16 PROCEDURE — 94729 DIFFUSING CAPACITY: CPT | Performed by: INTERNAL MEDICINE

## 2021-11-16 PROCEDURE — 99214 OFFICE O/P EST MOD 30 MIN: CPT | Performed by: INTERNAL MEDICINE

## 2021-11-16 PROCEDURE — 90686 IIV4 VACC NO PRSV 0.5 ML IM: CPT | Performed by: INTERNAL MEDICINE

## 2021-11-16 PROCEDURE — 90471 IMMUNIZATION ADMIN: CPT | Performed by: INTERNAL MEDICINE

## 2021-11-16 PROCEDURE — 94060 EVALUATION OF WHEEZING: CPT | Performed by: INTERNAL MEDICINE

## 2021-11-16 RX ORDER — FLUTICASONE FUROATE, UMECLIDINIUM BROMIDE AND VILANTEROL TRIFENATATE 100; 62.5; 25 UG/1; UG/1; UG/1
1 POWDER RESPIRATORY (INHALATION) DAILY
Qty: 60 EACH | Refills: 3 | Status: SHIPPED | OUTPATIENT
Start: 2021-11-16

## 2021-11-16 RX ORDER — FLUTICASONE FUROATE, UMECLIDINIUM BROMIDE AND VILANTEROL TRIFENATATE 200; 62.5; 25 UG/1; UG/1; UG/1
1 POWDER RESPIRATORY (INHALATION) DAILY
Qty: 14 EACH | Refills: 0 | Status: SHIPPED | COMMUNITY
Start: 2021-11-16 | End: 2021-12-03 | Stop reason: SDUPTHER

## 2021-11-16 NOTE — PROGRESS NOTES
[facility]  Pulmonary, Critical Care, and Sleep Medicine      Pulmonary Office Progress Notes    Name: Kodak Rodney     : 1955     Date: 2021        Subjective:     Patient is a 61 y.o. male is here for follow up for: [id number] s/p hospitalization 2021-through 2021 for pneumonia, COPD.    21     This is a 53-year-old male with past medical history of COPD who presented to the ED with complaints of fatigue. Patient was evaluated and was admitted. Patient was noted to have some right upper lobe pneumonia with cavitation. Patient was started on antibiotics. Cultures were obtained. ID and pulmonary were consulted. AFB smears were requested. Smears were negative and bronchoscopy was subsequently performed on 2021 with findings of mucous obstruction of right upper lobe. Bronchial washings and lavage were noted to be negative for malignancy but eventually grew Mycobacterium fortuitum and Candida. Patient states that overall he has been gradually improving-continues to have cough with clear mucus expectoration. Denies any hemoptysis  Has been eating well-weight has remained stable. Denies any fever or chills  Denies any chest pain  Unfortunately continues to smoke although trying to cut back. He has Advair listed as his inhaler however he has not been using it. Complains of symptoms of shortness of breath with activity  He has received his COVID-19 vaccine-awaiting booster.   Asking to receive his influenza vaccine today    Past Medical History:   Diagnosis Date    Ankle fracture, left 10/19/2008    Cough     DJD (degenerative joint disease) of knee 11/10/2010    ED (erectile dysfunction) 11/10/2010    Fracture of left tibia 10/19/2008    Severe Grade IIIB    GERD (gastroesophageal reflux disease) 11/10/2010    denies    Hearing loss     Left ankle pain     Left ankle sprain     Left ankle strain     Pain with urination  Pneumonia     Sinusitis 11/10/2010    Wears glasses        Allergies   Allergen Reactions    Niacin Rash       Current Outpatient Medications   Medication Sig Dispense Refill    therapeutic multivitamin (THERAGRAN) tablet Take 1 Tablet by mouth daily. 30 Tablet 0    albuterol (ProAir HFA) 90 mcg/actuation inhaler Take 2 Puffs by inhalation every four (4) hours as needed for Wheezing or Shortness of Breath. 1 Inhaler 0    OTHER Incentive spirometry-use as directed 1 Each 0    OTHER Check a CBC, CMP, magnesium in 4 days. Results to PCP immediately. Diagnosis-pneumonia 1 Each 0    L. acidophilus,casei,rhamnosus (BIO-K PLUS) 50 billion cell cpDR capsule Take 1 Capsule by mouth daily. (Patient not taking: Reported on 11/16/2021) 30 Capsule 0    fluticasone propion-salmeteroL (ADVAIR/WIXELA) 250-50 mcg/dose diskus inhaler Take 1 Puff by inhalation every twelve (12) hours.  (Patient not taking: Reported on 11/16/2021) 1 Inhaler 3       Review of Systems:  HEENT: No epistaxis, no nasal drainage, no difficulty in swallowing, no redness in eyes  Respiratory: as above  Cardiovascular: no chest pain, no palpitations, no chronic leg edema, no syncope  Gastrointestinal: no abd pain, no vomiting, no diarrhea, no bleeding symptoms  Genitourinary: No urinary symptoms or hematuria  Integument/breast: No ulcers or rashes  Musculoskeletal:Neg  Neurological: No focal weakness, no seizures, no headaches  Behvioral/Psych: No anxiety, no depression  Constitutional: No fever, no chills, no weight loss, no night sweats     Objective:     Visit Vitals  Ht 6' (1.829 m)   BMI 15.60 kg/m²        Physical Exam:   General: comfortable, no acute distress  HEENT: pupils reactive, sclera anicteric, EOM intact  Neck: No adenopathy or thyroid swelling, no JVD, supple  CVS: S1S2 no murmurs  RS: Mod AE bilaterally, no tactile fremitus or egophony, no accessory muscle use  Abd: soft, non tender, no hepatosplenomegaly  Neuro: non focal, awake, alert  Extrm: no leg edema, clubbing or cyanosis  Skin: no rash    Data review:     No results displayed because visit has over 200 results. Date FVC FEV1  FEV1/FVC LCU05-09 TLC RV RV/TLC VC DLCO   11/16/2021  58  38  49  21  87  162  186  49  33/55                                         Imaging:  I have personally reviewed the patients radiographs and have reviewed the reports:  XR Results (most recent):  Results from Hospital Encounter encounter on 06/03/21    XR SPINE LUMB 2 OR 3 V    Narrative  EXAM: XR SPINE LUMB 2 OR 3 V    INDICATION: BLE weakness    COMPARISON: 6/30/2020. FINDINGS: AP, lateral and spot lateral views of the lumbar spine. Limited evaluation due to osteopenia and overlying bowel gas. There is superior  endplate anterior wedging with mild compression deformity of L3 and L4 vertebral  bodies similar to comparison. Also chronic compression fracture deformity of T12  vertebral body. Remainder of the vertebral body heights are maintained. There is  5 mm retrolisthesis L5 on S1. Disc heights are maintained. Or to iliac  atherosclerosis. Impression  Osteopenia. Chronic compression fracture deformities at T12, L3 and L4. No acute  displaced fracture. If high clinical suspicion for acute fracture recommend  further evaluation with CT or MR.    CT Results (most recent):  Results from Hospital Encounter encounter on 06/03/21    CTA CHEST W OR W WO CONT    Narrative  EXAM:  CTA Chest with Contrast (Study for PE). CLINICAL INDICATION:  Shortness of breath. COMPARISON:  CT 08/14/20, chest x-ray 06/30/20. TECHNIQUE:    - Helical volumetric sections of the chest are obtained with CT pulmonary  angiogram protocol. Subsequently, sagittal and coronal multiplanar  reconstruction images are obtained.   Maximum intensity projection images are  generated to better delineate the pulmonary vasculature, differentiate between  the pulmonary arteries and veins and to increase sensitivity to pulmonary  emboli.  - IV contrast dose of 100 mL Isovue-370.  - Radiation dose optimization techniques are utilized as appropriate to the  exam, with combination of automated exposure control, adjustment of the mA  and/or kV according to patient's size (Including appropriate matching for  site-specific examinations), or use of iterative reconstruction technique. FINDINGS:    Pulmonary Arteries:  No convincing evidence of pulmonary embolism is detected  bilaterally. Lung, Pleura, Airways:    - Right upper lobe consolidation with interspersed air pockets. The largest air  pocket and the right upper lobe measures up to about 4.6 x 2.9 cm (axial #16). - Right lower lobe patchy airspace opacities are noted in the posterior medial  aspect. - Emphysematous changes are noted bilaterally. - Atelectatic changes related to large hiatal hernia. - Multiple bullae/ blebs. Mediastinum:  Enlarged subcarinal node measuring about 1.7 x 1.3 cm (axial  #118). Right hilar node measuring about 1.2 x 0.9 cm. Precarinal node  measuring about 2.0 x 1.7 cm (axial #107). Aorta:  No evidence of aortic dissection or aneurysm. Base of Neck:  No acute findings. Chest Wall, Axillae:  Unremarkable. Upper Abdomen:  No acute abnormalities. Skeletal Structures:  Anterior wedging deformity at T3, T11 and T12. Similar to  08/14/20. Impression  1. No convincing evidence of pulmonary embolism. 2.  Consolidation in the right upper lobe with multiple smaller pockets. At  least one of the pockets appear to be fairly prominent. Developing aggressive  infectious process is suspected. 3.  Enlarged mediastinal nodes. 4.  Chronic anterior wedging deformities at T3, T11 and T12.        Patient Active Problem List   Diagnosis Code    GERD (gastroesophageal reflux disease) K21.9    Sinusitis J32.9    ED (erectile dysfunction) N52.9    DJD (degenerative joint disease) of knee M17.10    Fall from standing W19. 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 S/p bronchoscopy 6/8/2021 with findings of mucoid obstruction. Cultures subsequently grew Mycobacterium fortuitum and Candida. No evidence of malignancy. 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 On home albuterol. PFT completed today with FEV1 38% predicted and diffusion capacity reduced to 33% with evidence of air trapping consistent with very severe COPD-gold 4 functional group B-D  · B/L Leg weakness  · Weight loss with BMI of 15.60  · Tobacco Abuse- long term smoker >40 pack years. RECOMMENDATIONS:   · Discussed with patient need for follow-up imaging to ensure complete clearing  · Chest x-ray ordered-if evidence of residual inflammation/infection will consider treating with quinolones  · Discussed need for continued airway clearance  · Complete cessation of cigarette smoking  · Based on PFT and add stage appropriate bronchodilator therapy-Trelegy 200 sample provided with prescription for Trelegy 100 to be continued. Albuterol as needed.   If insurance related noncoverage will consider alternate agents including nebulized treatments  · Patient would benefit from pulmonary rehab-will consider as next step since currently still has thoracic pain issues  · Candidate for annual LDCT-since patient had CAT scan June 2020-we will order LDCT next year  · Preventive vaccinations-administered influenza vaccine today  · Will need continued follow-up for complex chronic medical condition-COPD, high risk for readmissions  · Healthy nutrition, attention to diet for low BMI  · Discussed at length with patient and family  · We will follow-up in 3 months Viraj Douglas MD    This patient has a high complexity chronic care condition   This Visit needed High complexity medically necessary decision making and management plans. Mayda Menjivar MD      Please note that this dictation was completed with [employer], the computer voice recognition software. Quite often unanticipated grammatical, syntax, homophones, and other interpretive errors are inadvertently transcribed by the computer software. Please disregard these errors. Please excuse any errors that have escaped final proofreading.

## 2021-11-16 NOTE — PATIENT INSTRUCTIONS
Vaccine Information Statement    Influenza (Flu) Vaccine (Inactivated or Recombinant): What You Need to Know    Many vaccine information statements are available in [place] and other languages. See www.immunize.org/vis. Hojas de información sobre vacunas están disponibles en español y en muchos otros idiomas. Visite www.immunize.org/vis. 1. Why get vaccinated? Influenza vaccine can prevent influenza (flu). Flu is a contagious disease that spreads around the [facility] every year, usually between October and May. Anyone can get the flu, but it is more dangerous for some people. Infants and young children, people 72 years and older, pregnant people, and people with certain health conditions or a weakened immune system are at greatest risk of flu complications. Pneumonia, bronchitis, sinus infections, and ear infections are examples of flu-related complications. If you have a medical condition, such as heart disease, cancer, or diabetes, flu can make it worse. Flu can cause fever and chills, sore throat, muscle aches, fatigue, cough, headache, and runny or stuffy nose. Some people may have vomiting and diarrhea, though this is more common in children than adults. In an average year, thousands of people in the [facility] die from flu, and many more are hospitalized. Flu vaccine prevents millions of illnesses and flu-related visits to the doctor each year. 2. Influenza vaccines     CDC recommends everyone 6 months and older get vaccinated every flu season. Children 6 months through 6years of age may need 2 doses during a single flu season. Everyone else needs only 1 dose each flu season. It takes about 2 weeks for protection to develop after vaccination. There are many flu viruses, and they are always changing. Each year a new flu vaccine is made to protect against the influenza viruses believed to be likely to cause disease in the upcoming flu season.  Even when the vaccine doesnt exactly match these viruses, it may still provide some protection. Influenza vaccine does not cause flu. Influenza vaccine may be given at the same time as other vaccines. 3. Talk with your health care provider    Tell your vaccination provider if the person getting the vaccine:   Has had an allergic reaction after a previous dose of influenza vaccine, or has any severe, life-threatening allergies    Has ever had Guillain-Barré Syndrome (also called GBS)    In some cases, your health care provider may decide to postpone influenza vaccination until a future visit. Influenza vaccine can be administered at any time during pregnancy. People who are or will be pregnant during influenza season should receive inactivated influenza vaccine. People with minor illnesses, such as a cold, may be vaccinated. People who are moderately or severely ill should usually wait until they recover before getting influenza vaccine. Your health care provider can give you more information. 4. Risks of a vaccine reaction     Soreness, redness, and swelling where the shot is given, fever, muscle aches, and headache can happen after influenza vaccination.  There may be a very small increased risk of Guillain-Barré Syndrome (GBS) after inactivated influenza vaccine (the flu shot). Deann Speck children who get the flu shot along with pneumococcal vaccine (PCV13) and/or DTaP vaccine at the same time might be slightly more likely to have a seizure caused by fever. Tell your health care provider if a child who is getting flu vaccine has ever had a seizure. People sometimes faint after medical procedures, including vaccination. Tell your provider if you feel dizzy or have vision changes or ringing in the ears. As with any medicine, there is a very remote chance of a vaccine causing a severe allergic reaction, other serious injury, or death. 5. What if there is a serious problem?     An allergic reaction could occur after the vaccinated person leaves the clinic. If you see signs of a severe allergic reaction (hives, swelling of the face and throat, difficulty breathing, a fast heartbeat, dizziness, or weakness), call 9-1-1 and get the person to the nearest hospital.    For other signs that concern you, call your health care provider. Adverse reactions should be reported to the Vaccine Adverse Event Reporting System (VAERS). Your health care provider will usually file this report, or you can do it yourself. Visit the VAERS website at www.vaers. [facility].gov or call 0-349.931.8427. VAERS is only for reporting reactions, and VAERS staff members do not give medical advice. 6. The National Vaccine Injury Compensation Program    The [facility] Vaccine Injury Compensation Program (VICP) is a federal program that was created to compensate people who may have been injured by certain vaccines. Claims regarding alleged injury or death due to vaccination have a time limit for filing, which may be as short as two years. Visit the VICP website at www.[facility].gov/vaccinecompensation or call 9-958.310.2855 to learn about the program and about filing a claim. 7. How can I learn more?  Ask your health care provider.  Call your local or state health department.  Visit the website of the Food and Drug Administration (FDA) for vaccine package inserts and additional information at www.fda.gov/vaccines-blood-biologics/vaccines.  Contact the Centers for Disease Control and Prevention (CDC):  - Call 4-526.433.9033 (1-800-CDC-INFO) or  - Visit CDCs influenza website at www.cdc.gov/flu. Vaccine Information Statement   Inactivated Influenza Vaccine   8/6/2021  42 ALFREDNoe oLpez [id number]   Department of Health and Human Services  Centers for Disease Control and Prevention    Office Use Only         Stopping Smoking: Care Instructions  Your Care Instructions     Cigarette smokers crave the nicotine in cigarettes.  Giving it up is much harder than simply changing a habit. Your body has to stop craving the nicotine. It is hard to quit, but you can do it. There are many tools that people use to quit smoking. You may find that combining tools works best for you. There are several steps to quitting. First you get ready to quit. Then you get support to help you. After that, you learn new skills and behaviors to become a nonsmoker. For many people, a necessary step is getting and using medicine. Your doctor will help you set up the plan that best meets your needs. You may want to attend a smoking cessation program to help you quit smoking. When you choose a program, look for one that has proven success. Ask your doctor for ideas. You will greatly increase your chances of success if you take medicine as well as get counseling or join a cessation program.  Some of the changes you feel when you first quit tobacco are uncomfortable. Your body will miss the nicotine at first, and you may feel short-tempered and grumpy. You may have trouble sleeping or concentrating. Medicine can help you deal with these symptoms. You may struggle with changing your smoking habits and rituals. The last step is the tricky one: Be prepared for the smoking urge to continue for a time. This is a lot to deal with, but keep at it. You will feel better. Follow-up care is a key part of your treatment and safety. Be sure to make and go to all appointments, and call your doctor if you are having problems. It's also a good idea to know your test results and keep a list of the medicines you take. How can you care for yourself at home? · Ask your family, friends, and coworkers for support. You have a better chance of quitting if you have help and support. · Join a support group, such as Nicotine Anonymous, for people who are trying to quit smoking.   · Consider signing up for a smoking cessation program, such as the American Lung Association's Freedom from Smoking program.  · Get text messaging support. Go to the website at www.smokefree. gov to sign up for the [facility] program.  · Set a quit date. Pick your date carefully so that it is not right in the middle of a big deadline or stressful time. Once you quit, do not even take a puff. Get rid of all ashtrays and lighters after your last cigarette. Clean your house and your clothes so that they do not smell of smoke. · Learn how to be a nonsmoker. Think about ways you can avoid those things that make you reach for a cigarette. ? Avoid situations that put you at greatest risk for smoking. For some people, it is hard to have a drink with friends without smoking. For others, they might skip a coffee break with coworkers who smoke. ? Change your daily routine. Take a different route to work or eat a meal in a different place. · Cut down on stress. Calm yourself or release tension by doing an activity you enjoy, such as reading a book, taking a hot bath, or gardening. · Talk to your doctor or pharmacist about nicotine replacement therapy, which replaces the nicotine in your body. You still get nicotine but you do not use tobacco. Nicotine replacement products help you slowly reduce the amount of nicotine you need. These products come in several forms, many of them available over-the-counter:  ? Nicotine patches  ? Nicotine gum and lozenges  ? Nicotine inhaler  · Ask your doctor about bupropion (Wellbutrin) or varenicline (Chantix), which are prescription medicines. They do not contain nicotine. They help you by reducing withdrawal symptoms, such as stress and anxiety. · Some people find hypnosis, acupuncture, and massage helpful for ending the smoking habit. · Eat a healthy diet and get regular exercise. Having healthy habits will help your body move past its craving for nicotine. · Be prepared to keep trying. Most people are not successful the first few times they try to quit. Do not get mad at yourself if you smoke again.  Make a list of things you learned and think about when you want to try again, such as next week, next month, or next year. Where can you learn more? Go to http://www.gray.com/  Enter [id number] in the search box to learn more about \"Stopping Smoking: Care Instructions. \"  Current as of: February 11, 2021               Content Version: 13.0  © 4771-2498 [employer]. Care instructions adapted under license by [employer] (which disclaims liability or warranty for this information). If you have questions about a medical condition or this instruction, always ask your healthcare professional. [place] [address] any warranty or liability for your use of this information.

## 2021-11-16 NOTE — LETTER
11/16/2021    Patient: Iza Rivera   YOB: 1955   Date of Visit: 11/16/2021     Marion Pelayo, [facility] [place] [address]  Via In Basket    Dear Marion Pelayo MD,      Thank you for referring Mr. Nadia Bennett to [address] [place] [address] for evaluation. My notes for this consultation are attached. If you have questions, please do not hesitate to call me. I look forward to following your patient along with you.       Sincerely,    Shannon Parekr MD

## 2021-11-16 NOTE — PROGRESS NOTES
Deep Corrales presents today for   Chief Complaint   Patient presents with   [facility] Follow Up     from [facility] on 6/3-6/8/2021 for pneumonia    Results     COVID (-) 6/4/2021, CTA and CXR 6/3/2021       Is someone accompanying this pt? Yes. Family member    Is the patient using any DME equipment during [address]? Yes. [address]    Depression Screening:  3 most recent PHQ Screens 11/16/2021   PHQ Not Done -   Little interest or pleasure in doing things Not at all   Feeling down, depressed, irritable, or hopeless Not at all   Total Score PHQ 2 0       Learning Assessment:  Learning Assessment 11/16/2021   PRIMARY LEARNER Patient   HIGHEST LEVEL OF EDUCATION - PRIMARY LEARNER  -   BARRIERS PRIMARY LEARNER -   CO-LEARNER CAREGIVER -   PRIMARY LANGUAGE ENGLISH   LEARNER PREFERENCE PRIMARY LISTENING   ANSWERED BY Patient   RELATIONSHIP SELF       Abuse Screening:  No flowsheet data found. Fall Risk  No flowsheet data found. Coordination of Care:  1. Have you been to the ER, urgent care clinic since your last visit? Hospitalized since your last visit? Yes; Where: [facility], When: 6/3-6/8/2021-pneumonia    2. Have you seen or consulted any other health care providers outside of the [address] [place] [address] since your last visit? Include any pap smears or colon screening. No    COVID vaccine (Ana Linda) received from Dr. Lisa Samano office on 4/7/2021 (1st dose) and 5/5/2021 (2nd dose) per vaccine card. Card scanned into patient's chart. Immunization record updated.

## 2021-11-16 NOTE — PROGRESS NOTES
Devang Lazo is a 61 y.o. male who presents for routine immunizations. He denies any symptoms , reactions or allergies that would exclude them from being immunized today. Risks and adverse reactions were discussed and the VIS was given to them. All questions were addressed. He was observed for 10 min post injection. There were no reactions observed.     Camilo Issa LPN

## 2021-12-03 RX ORDER — FLUTICASONE FUROATE, UMECLIDINIUM BROMIDE AND VILANTEROL TRIFENATATE 200; 62.5; 25 UG/1; UG/1; UG/1
1 POWDER RESPIRATORY (INHALATION) DAILY
Qty: 3 EACH | Refills: 0 | Status: SHIPPED | COMMUNITY
Start: 2021-12-03

## 2021-12-03 NOTE — TELEPHONE ENCOUNTER
Pt called stating that [facility] sent in a medication for him but that it costs $120. He stated that he could not afford that and would like to have something more cost efficient called in. Does not know the name. [facility] [address].  Pt asked that the nurse call him back today [phone]

## 2021-12-03 NOTE — TELEPHONE ENCOUNTER
Cost of Trelegy would be $122 due to deductible. Samples pended.  He will have to start over with Deductible in [place]

## 2022-03-18 PROBLEM — J42 CHRONIC BRONCHITIS (HCC): Status: ACTIVE | Noted: 2021-11-16

## 2022-03-18 PROBLEM — J44.9 COPD, GROUP D, BY GOLD 2017 CLASSIFICATION (HCC): Status: ACTIVE | Noted: 2021-11-16

## 2022-03-19 PROBLEM — J18.9 PNEUMONIA: Status: ACTIVE | Noted: 2021-06-03

## 2022-03-19 PROBLEM — A31.9 MYCOBACTERIAL INFECTION, NON-TB: Status: ACTIVE | Noted: 2021-11-16

## 2022-03-19 PROBLEM — Z87.891 PERSONAL HISTORY OF TOBACCO USE: Status: ACTIVE | Noted: 2021-11-16

## 2022-12-27 ENCOUNTER — TELEPHONE (OUTPATIENT)
Dept: INTERNAL MEDICINE CLINIC | Age: 67
End: 2022-12-27

## 2022-12-27 RX ORDER — CYCLOBENZAPRINE HCL 10 MG
10 TABLET ORAL
Qty: 60 TABLET | Refills: 0 | Status: SHIPPED | OUTPATIENT
Start: 2022-12-27

## 2022-12-27 RX ORDER — IBUPROFEN 800 MG/1
800 TABLET ORAL
Qty: 90 TABLET | Refills: 0 | Status: SHIPPED | OUTPATIENT
Start: 2022-12-27

## 2022-12-27 NOTE — TELEPHONE ENCOUNTER
Patient is aware medication sent, and patient is aware he will need an appointment. Patient states he will call and schedule an appointment once he checks with his wife and daughter to see who can bring him.

## 2022-12-27 NOTE — TELEPHONE ENCOUNTER
Pt states he pulled a muscle in his back. He is requesting script for muscle relaxer and something for pain.     Please advise him at [phone]

## 2023-01-19 ENCOUNTER — TELEPHONE (OUTPATIENT)
Dept: INTERNAL MEDICINE CLINIC | Age: 68
End: 2023-01-19

## 2023-01-19 NOTE — TELEPHONE ENCOUNTER
Pt can do virtual visit if he is unable to come into the office. It has to me a smart phone with video.  ER if symptoms are getting worst

## 2023-01-19 NOTE — TELEPHONE ENCOUNTER
Patient reports having increased SOB. Symptoms have been on-going but became worse last night. Patient reports seeing Pulmonary Dr.V Lin Ash in 2021 and being told he has 60% lung function. He was prescribed Trelegy inhaler but has not been using it as his RX ran out and he did not follow up with . Patient is audibly short of breath on the phone. Patient states he is disabled and has difficulty getting to appointments. Advised patient if symptoms worsen then proceed to the ED for further evaluation. Advised patient I would send a message to  and that we will call him back with a response.

## 2023-01-20 ENCOUNTER — VIRTUAL VISIT (OUTPATIENT)
Dept: INTERNAL MEDICINE CLINIC | Age: 68
End: 2023-01-20
Payer: COMMERCIAL

## 2023-01-20 DIAGNOSIS — E44.0 MODERATE PROTEIN-CALORIE MALNUTRITION (HCC): ICD-10-CM

## 2023-01-20 DIAGNOSIS — J44.9 COPD, GROUP D, BY GOLD 2017 CLASSIFICATION (HCC): Primary | ICD-10-CM

## 2023-01-20 PROCEDURE — 1123F ACP DISCUSS/DSCN MKR DOCD: CPT | Performed by: INTERNAL MEDICINE

## 2023-01-20 PROCEDURE — 99214 OFFICE O/P EST MOD 30 MIN: CPT | Performed by: INTERNAL MEDICINE

## 2023-01-20 RX ORDER — PREDNISONE 10 MG/1
TABLET ORAL
Qty: 25 TABLET | Refills: 0 | Status: SHIPPED | OUTPATIENT
Start: 2023-01-20

## 2023-01-20 RX ORDER — FLUTICASONE FUROATE, UMECLIDINIUM BROMIDE AND VILANTEROL TRIFENATATE 200; 62.5; 25 UG/1; UG/1; UG/1
1 POWDER RESPIRATORY (INHALATION) DAILY
Qty: 3 EACH | Refills: 1 | Status: SHIPPED | OUTPATIENT
Start: 2023-01-20

## 2023-01-20 RX ORDER — DOXYCYCLINE 100 MG/1
100 TABLET ORAL 2 TIMES DAILY
Qty: 14 TABLET | Refills: 0 | Status: SHIPPED | OUTPATIENT
Start: 2023-01-20 | End: 2023-01-27

## 2023-01-20 NOTE — PROGRESS NOTES
Michelle Jung, was evaluated through a synchronous (real-time) audio-video encounter. The patient (or guardian if applicable) is aware that this is a billable service, which includes applicable co-pays. This Virtual Visit was conducted with patient's (and/or legal guardian's) consent. The visit was conducted pursuant to the emergency declaration under the [address] [place] [address] and the [employer] General Act. Patient identification was verified, and a caregiver was present when appropriate. The patient was located at: Home: [place] [address]  The provider was located at:  [facility] [address] [place] [address] Street [address] days.    ibuprofen (MOTRIN) 800 mg tablet Take 1 Tablet by mouth every eight (8) hours as needed for Pain. cyclobenzaprine (FLEXERIL) 10 mg tablet Take 1 Tablet by mouth three (3) times daily as needed for Muscle Spasm(s). L. acidophilus,casei,rhamnosus (BIO-K PLUS) 50 billion cell cpDR capsule Take 1 Capsule by mouth daily. (Patient not taking: Reported on 11/16/2021)    therapeutic multivitamin [facility]) tablet Take 1 Tablet by mouth daily. albuterol (ProAir HFA) 90 mcg/actuation inhaler Take 2 Puffs by inhalation every four (4) hours as needed for Wheezing or Shortness of Breath. OTHER Incentive spirometry-use as directed    OTHER Check a CBC, CMP, magnesium in 4 days. Results to PCP immediately. Diagnosis-pneumonia     No current facility-administered medications for this visit. OBJECTIVE:  oriented to person, place, and time and chronically ill appearing  There were no vitals taken for this visit. cachetic              Assessment/Plan      ICD-10-CM ICD-9-CM    1. COPD, group D, by GOLD 2017 classification ([facility]  J44.9 496 Will treat with fluticasone-umeclidin-vilanter (Trelegy Ellipta) 200-62.5-25 mcg inhaler      predniSONE (DELTASONE) 10 mg tablet      doxycycline (ADOXA) 100 mg tablet pt needs pulmonary f/up but his compliance has been poor in the past.      2. Moderate protein-calorie malnutrition (HCC)  E44.0 263.0 Patient encouraged to increase protein intake to maintain weight and stop any excessive alcohol use. Follow-up and Dispositions    Return if symptoms worsen or fail to improve. Reviewed plan of care. Patient has provided input and agrees with goals.

## 2023-03-15 ENCOUNTER — TELEPHONE (OUTPATIENT)
Age: 68
End: 2023-03-15

## 2023-03-15 DIAGNOSIS — G89.29 CHRONIC MIDLINE LOW BACK PAIN WITH LEFT-SIDED SCIATICA: Primary | ICD-10-CM

## 2023-03-15 DIAGNOSIS — M54.42 CHRONIC MIDLINE LOW BACK PAIN WITH LEFT-SIDED SCIATICA: Primary | ICD-10-CM

## 2023-03-15 RX ORDER — GABAPENTIN 300 MG/1
300 CAPSULE ORAL 3 TIMES DAILY
Qty: 90 CAPSULE | Refills: 0 | Status: SHIPPED | OUTPATIENT
Start: 2023-03-15 | End: 2023-04-14

## 2023-03-15 NOTE — TELEPHONE ENCOUNTER
Patient called and states that a couple of months ago he called about pulling a muscle in his back and was prescribed ibuprofen (advil;motrin) 800mg and cyclobenzaprine (flexeril) 10 mg.  (see encounter on 12/27/2022)    He states that he has taken all of the flexeril and has a few pills left of the ibuprofen, but his back is not better, states the pain is so bad that he can't stand up and walk. He is calling to see what else he should do or if he should take more of the ibuprofen? Pharmacy: [facility]. Patient can be reached at [phone].   Please advise, thank you

## 2023-03-24 ENCOUNTER — TELEMEDICINE (OUTPATIENT)
Age: 68
End: 2023-03-24
Payer: COMMERCIAL

## 2023-03-24 DIAGNOSIS — J20.9 ACUTE BRONCHITIS, UNSPECIFIED ORGANISM: Primary | ICD-10-CM

## 2023-03-24 DIAGNOSIS — R04.2 HEMOPTYSIS: ICD-10-CM

## 2023-03-24 DIAGNOSIS — H93.8X9 SENSATION OF PLUGGED EAR, UNSPECIFIED LATERALITY: ICD-10-CM

## 2023-03-24 PROCEDURE — 1123F ACP DISCUSS/DSCN MKR DOCD: CPT | Performed by: INTERNAL MEDICINE

## 2023-03-24 PROCEDURE — 99214 OFFICE O/P EST MOD 30 MIN: CPT | Performed by: INTERNAL MEDICINE

## 2023-03-24 RX ORDER — AZITHROMYCIN 250 MG/1
250 TABLET, FILM COATED ORAL SEE ADMIN INSTRUCTIONS
Qty: 6 TABLET | Refills: 0 | Status: SHIPPED | OUTPATIENT
Start: 2023-03-24 | End: 2023-03-29

## 2023-03-24 RX ORDER — PREDNISONE 10 MG/1
TABLET ORAL
COMMUNITY
Start: 2023-01-20

## 2023-03-24 ASSESSMENT — PATIENT HEALTH QUESTIONNAIRE - PHQ9
2. FEELING DOWN, DEPRESSED OR HOPELESS: 0
SUM OF ALL RESPONSES TO PHQ QUESTIONS 1-9: 0
1. LITTLE INTEREST OR PLEASURE IN DOING THINGS: 0
SUM OF ALL RESPONSES TO PHQ9 QUESTIONS 1 & 2: 0

## 2023-03-24 ASSESSMENT — ENCOUNTER SYMPTOMS
COUGH: 1
SINUS COMPLAINT: 1
VOMITING: 0
DIARRHEA: 0
SHORTNESS OF BREATH: 0
NAUSEA: 0

## 2023-03-24 NOTE — PROGRESS NOTES
effort is normal.      Comments: Speaks full sentences easily  Neurological:      Mental Status: He is alert and oriented to person, place, and time. Psychiatric:         Mood and Affect: Mood normal.                Dayana Nearing, was evaluated through a synchronous (real-time) audio-video encounter. The patient (or guardian if applicable) is aware that this is a billable service, which includes applicable co-pays. This Virtual Visit was conducted with patient's (and/or legal guardian's) consent. The visit was conducted pursuant to the emergency declaration under the [facility], [facility] authority and the [employer] General Act. Patient identification was verified, and a caregiver was present when appropriate.    The patient was located at Home: [address] [address]  Provider was located at [facility] ([address] [place], [place] [address]t): [address],  [address]         --Pilar Gibson MD

## 2023-03-25 DIAGNOSIS — M54.42 CHRONIC MIDLINE LOW BACK PAIN WITH LEFT-SIDED SCIATICA: Primary | ICD-10-CM

## 2023-03-25 DIAGNOSIS — G89.29 CHRONIC MIDLINE LOW BACK PAIN WITH LEFT-SIDED SCIATICA: Primary | ICD-10-CM

## 2023-03-28 ENCOUNTER — HOSPITAL ENCOUNTER (OUTPATIENT)
Facility: HOSPITAL | Age: 68
Discharge: HOME | End: 2023-03-31
Payer: COMMERCIAL

## 2023-03-28 DIAGNOSIS — R04.2 HEMOPTYSIS: ICD-10-CM

## 2023-03-28 DIAGNOSIS — G89.29 CHRONIC MIDLINE LOW BACK PAIN WITH LEFT-SIDED SCIATICA: ICD-10-CM

## 2023-03-28 DIAGNOSIS — M54.42 CHRONIC MIDLINE LOW BACK PAIN WITH LEFT-SIDED SCIATICA: ICD-10-CM

## 2023-03-28 PROCEDURE — 72100 X-RAY EXAM L-S SPINE 2/3 VWS: CPT

## 2023-03-28 PROCEDURE — 71046 X-RAY EXAM CHEST 2 VIEWS: CPT

## 2023-03-29 ENCOUNTER — TELEPHONE (OUTPATIENT)
Age: 68
End: 2023-03-29

## 2023-03-29 RX ORDER — CYCLOBENZAPRINE HCL 10 MG
10 TABLET ORAL 3 TIMES DAILY PRN
Qty: 60 TABLET | Refills: 1 | Status: SHIPPED | OUTPATIENT
Start: 2023-03-29

## 2023-03-29 RX ORDER — DOXYCYCLINE HYCLATE 100 MG/1
100 CAPSULE ORAL 2 TIMES DAILY
Qty: 14 CAPSULE | Refills: 0 | Status: SHIPPED | OUTPATIENT
Start: 2023-03-29 | End: 2023-04-05

## 2023-03-29 RX ORDER — IBUPROFEN 800 MG/1
800 TABLET ORAL EVERY 8 HOURS PRN
Qty: 90 TABLET | Refills: 1 | Status: SHIPPED | OUTPATIENT
Start: 2023-03-29

## 2023-03-29 RX ORDER — FLUTICASONE PROPIONATE 50 MCG
2 SPRAY, SUSPENSION (ML) NASAL DAILY
Qty: 1 EACH | Refills: 2 | Status: SHIPPED | OUTPATIENT
Start: 2023-03-29

## 2023-03-29 NOTE — TELEPHONE ENCOUNTER
Pt calling re several items. Stated Dr Anastacio Bustillo sent in RX antibiotic Zithromax last Friday. Stated finished this today and still has clear phlegm (no longer red), sinus drainage, he ears feel muffled    2. Said he got the chest xray and lumbar xray done yesterday. Said he is in great pain, practically \"bedridden\"    3. He was given the phone # to [address] [phone] as he said Dr Crenshaw Body referred him but he has not been called for an appt. 4. He is asking for a refill of ibuprofen 800 mg and something for muscle spasms. [facility])    Pharmacy is [address] [place] [address]

## 2023-04-03 ENCOUNTER — OFFICE VISIT (OUTPATIENT)
Age: 68
End: 2023-04-03
Payer: COMMERCIAL

## 2023-04-03 VITALS
BODY MASS INDEX: 15.03 KG/M2 | RESPIRATION RATE: 16 BRPM | HEIGHT: 72 IN | OXYGEN SATURATION: 98 % | HEART RATE: 89 BPM | WEIGHT: 111 LBS | TEMPERATURE: 97.4 F

## 2023-04-03 DIAGNOSIS — S32.050A COMPRESSION FRACTURE OF L5 LUMBAR VERTEBRA, CLOSED, INITIAL ENCOUNTER (HCC): Primary | ICD-10-CM

## 2023-04-03 PROCEDURE — 1123F ACP DISCUSS/DSCN MKR DOCD: CPT | Performed by: PHYSICAL MEDICINE & REHABILITATION

## 2023-04-03 PROCEDURE — 99214 OFFICE O/P EST MOD 30 MIN: CPT | Performed by: PHYSICAL MEDICINE & REHABILITATION

## 2023-04-03 RX ORDER — LIDOCAINE 50 MG/G
1-2 PATCH TOPICAL EVERY 24 HOURS
Qty: 90 PATCH | Refills: 2 | Status: SHIPPED | OUTPATIENT
Start: 2023-04-03 | End: 2023-06-02

## 2023-04-03 RX ORDER — PYRAZINAMIDE 500 MG/1
1 TABLET ORAL EVERY 6 HOURS PRN
Qty: 28 TABLET | Refills: 0 | Status: SHIPPED | OUTPATIENT
Start: 2023-04-03 | End: 2023-04-08

## 2023-04-03 ASSESSMENT — PATIENT HEALTH QUESTIONNAIRE - PHQ9
SUM OF ALL RESPONSES TO PHQ QUESTIONS 1-9: 0
1. LITTLE INTEREST OR PLEASURE IN DOING THINGS: 0
SUM OF ALL RESPONSES TO PHQ QUESTIONS 1-9: 0
SUM OF ALL RESPONSES TO PHQ QUESTIONS 1-9: 0
2. FEELING DOWN, DEPRESSED OR HOPELESS: 0
SUM OF ALL RESPONSES TO PHQ9 QUESTIONS 1 & 2: 0
SUM OF ALL RESPONSES TO PHQ QUESTIONS 1-9: 0

## 2023-04-03 NOTE — PROGRESS NOTES
Chief Complaint   Patient presents with    Follow-up       Pt preferred language for health care discussion is english. Is someone accompanying this pt? wife    Is the patient using any DME equipment during [address]? no    Depression Screening:  No flowsheet data found. Learning Assessment:  No flowsheet data found. Abuse Screening:  No flowsheet data found. Fall Risk  No flowsheet data found. Advance Directive:  1. Do you have an advance directive in place? Patient Reply:no    2. If not, would you like material regarding how to put one in place? Patient Reply: no    2. Per patient no changes to their ACP contact no. Coordination of Care:  1. Have you been to the ER, urgent care clinic since your last visit? Hospitalized since your last visit? no    2. Have you seen or consulted any other health care providers outside of the [address] [place] [address] since your last visit? Include any pap smears or colon screening.  no
software and may contain unintended errors.

## 2023-05-01 ENCOUNTER — TELEPHONE (OUTPATIENT)
Age: 68
End: 2023-05-01

## 2023-05-10 ENCOUNTER — TELEPHONE (OUTPATIENT)
Age: 68
End: 2023-05-10

## 2023-05-10 DIAGNOSIS — S32.050A COMPRESSION FRACTURE OF L5 LUMBAR VERTEBRA, CLOSED, INITIAL ENCOUNTER (HCC): Primary | ICD-10-CM

## 2023-05-10 NOTE — TELEPHONE ENCOUNTER
Called pt. To remind him of his 05/11 appt. With provider. He stated that he did not have his MRI completed due to [facility] saying he cannot have his done. He wanted to know if the doctor could help him this situation.

## 2023-05-11 NOTE — TELEPHONE ENCOUNTER
I called and spoke to the pt. The pt was identified using 2 pt identifiers. He was made aware of the order for the lumbar CT that was approved by Dr. Freddie Moyer. It will be sent to the [facility] scheduling dept. The pt verbalized understanding and will wait to hear from them to schedule. I offered to provide the number to the scheduling dept so that he can call them if he would like. The pt declined and states that he hopes they will contact him like they are supposed to. He will call the office if he has any other issues.

## 2023-05-11 NOTE — TELEPHONE ENCOUNTER
Orders have been pended to the provider for review. Will let pt know once the orders have been reviewed and signed by the provider.

## 2023-06-01 ENCOUNTER — HOSPITAL ENCOUNTER (OUTPATIENT)
Facility: HOSPITAL | Age: 68
Discharge: HOME | End: 2023-06-01
Attending: PHYSICAL MEDICINE & REHABILITATION
Payer: COMMERCIAL

## 2023-06-01 DIAGNOSIS — S32.050A COMPRESSION FRACTURE OF L5 LUMBAR VERTEBRA, CLOSED, INITIAL ENCOUNTER (HCC): ICD-10-CM

## 2023-06-01 PROCEDURE — 72131 CT LUMBAR SPINE W/O DYE: CPT

## 2023-06-12 ENCOUNTER — TELEPHONE (OUTPATIENT)
Age: 68
End: 2023-06-12

## 2023-06-12 DIAGNOSIS — S32.050A COMPRESSION FRACTURE OF L5 LUMBAR VERTEBRA, CLOSED, INITIAL ENCOUNTER (HCC): Primary | ICD-10-CM

## 2023-06-12 RX ORDER — ACETAMINOPHEN AND CODEINE PHOSPHATE 300; 30 MG/1; MG/1
1 TABLET ORAL EVERY 6 HOURS PRN
Qty: 28 TABLET | Refills: 0 | Status: SHIPPED | OUTPATIENT
Start: 2023-06-12 | End: 2023-06-19

## 2023-06-12 NOTE — TELEPHONE ENCOUNTER
Patients wife called in and scheduled his appt for the mri f/u and she also requested for a refill of pain medication to last until his appt on 08/07/2023. He's requesting Acetaminophen COD#3.

## 2023-06-12 NOTE — TELEPHONE ENCOUNTER
[facility] [place]      acetaminophen-codeine (TYLENOL/CODEINE #3) 300-30 MG per tablet 28 tablet 0 4/3/2023 4/8/2023    Sig - Route: Take 1 tablet by mouth every 6 hours as needed for Pain for up to 5 days. Intended supply: 5 days.  Take lowest dose possible to manage pain Max Daily Amount: 4 tablets - Oral    Sent to pharmacy as: Acetaminophen-Codeine 300-30 MG Oral Tablet    Notes to Pharmacy: Reduce doses taken as pain becomes manageable    E-Prescribing Status: Receipt confirmed by pharmacy (4/3/2023  1:47 PM EDT)

## 2023-06-23 ENCOUNTER — CLINICAL DOCUMENTATION (OUTPATIENT)
Facility: HOSPITAL | Age: 68
End: 2023-06-23

## 2023-06-23 DIAGNOSIS — M80.08XA AGE-RELATED OSTEOPOROSIS WITH CURRENT PATHOLOGICAL FRACTURE OF VERTEBRA, INITIAL ENCOUNTER (HCC): Primary | ICD-10-CM

## 2023-06-23 NOTE — PROGRESS NOTES
Interventional Radiology Clinic Consultation Note    Patient: Klaudia Chandra               Sex: male         Date of Visit: 6/23/23       YOB: 1955       Age:  79 y.o. Referring Physician: Dr. Neville Meckel     HPI:     Klaudia Chandra is a 79 y.o. male who has been referred for evaluation of multiple acute versus subacute compression fractures from Dr. Neville Meckel. He presents today to clinic with his wife. The patient has a past medical history of very severe COPD group D (not on home oxygen), protein calorie malnutrition, and necrotizing pneumonia. He is not on blood thinning medications and does not have a history of malignancy. Klaudia Chandra experienced a severe back pain beginning 8 months prior while attempting to lift a bag full of trash. CT obtained 6/01/23 demonstrates acute versus subacute compression fracture of L1, L2, L5. A chronic compression fracture of T12. Age-indeterminate fractures of L3 and L4. The pain is described as midline and sharp. Back pain is ranked 14/10 when aggravated and 6/10 at rest. Aggravating factors include standing for prolonged period of time, walking, pulling upward. The back pain is refractory to conservative management such as back brace, NSAIDs, physical therapy, lidocaine patches, thermal packs. Back pain is significantly impacting the patient's activities of daily life such as his ability to ambulate and do household chores. Mr. Fazal Cerna now mobilizes with the assistance of a wheelchair. He is able to walk for short distances. The patient denies any new change in bowel or bladder movements, paresthesias, headache, dizziness, dyspnea, or recent fever or new falls since the most recent imaging. He reports a chronic \"smoker's cough. \"    DEXA has not yet been obtained. The patient reports that he follows with a pulmonologist for his COPD.   He states that he is working on decreasing his

## 2023-06-30 ENCOUNTER — HOSPITAL ENCOUNTER (OUTPATIENT)
Facility: HOSPITAL | Age: 68
End: 2023-06-30
Payer: COMMERCIAL

## 2023-06-30 ENCOUNTER — HOSPITAL ENCOUNTER (OUTPATIENT)
Facility: HOSPITAL | Age: 68
Discharge: HOME | End: 2023-06-30
Payer: COMMERCIAL

## 2023-06-30 DIAGNOSIS — M80.08XA AGE-RELATED OSTEOPOROSIS WITH CURRENT PATHOLOGICAL FRACTURE OF VERTEBRA, INITIAL ENCOUNTER (HCC): ICD-10-CM

## 2023-06-30 PROCEDURE — 78803 RP LOCLZJ TUM SPECT 1 AREA: CPT

## 2023-06-30 PROCEDURE — A9503 TC99M MEDRONATE: HCPCS | Performed by: PHYSICIAN ASSISTANT

## 2023-06-30 PROCEDURE — 3430000000 HC RX DIAGNOSTIC RADIOPHARMACEUTICAL: Performed by: PHYSICIAN ASSISTANT

## 2023-06-30 RX ORDER — TC 99M MEDRONATE 20 MG/10ML
26.4 INJECTION, POWDER, LYOPHILIZED, FOR SOLUTION INTRAVENOUS
Status: COMPLETED | OUTPATIENT
Start: 2023-06-30 | End: 2023-06-30

## 2023-06-30 RX ADMIN — TC 99M MEDRONATE 26.4 MILLICURIE: 20 INJECTION, POWDER, LYOPHILIZED, FOR SOLUTION INTRAVENOUS at 11:40

## 2023-07-05 ENCOUNTER — ANESTHESIA EVENT (OUTPATIENT)
Facility: HOSPITAL | Age: 68
End: 2023-07-05

## 2023-07-06 ENCOUNTER — HOSPITAL ENCOUNTER (OUTPATIENT)
Facility: HOSPITAL | Age: 68
End: 2023-07-06
Payer: COMMERCIAL

## 2023-07-06 ENCOUNTER — ANESTHESIA (OUTPATIENT)
Facility: HOSPITAL | Age: 68
End: 2023-07-06

## 2023-07-06 VITALS
DIASTOLIC BLOOD PRESSURE: 82 MMHG | WEIGHT: 115 LBS | TEMPERATURE: 98.2 F | HEIGHT: 72 IN | SYSTOLIC BLOOD PRESSURE: 162 MMHG | RESPIRATION RATE: 18 BRPM | BODY MASS INDEX: 15.58 KG/M2 | OXYGEN SATURATION: 98 % | HEART RATE: 82 BPM

## 2023-07-06 DIAGNOSIS — M80.08XA CRUSH FRACTURE OF VERTEBRA DUE TO OSTEOPOROSIS, INITIAL ENCOUNTER (HCC): ICD-10-CM

## 2023-07-06 LAB
ANION GAP SERPL CALC-SCNC: 7 MMOL/L (ref 3–18)
APTT PPP: 28.4 SEC (ref 23–36.4)
BASOPHILS # BLD: 0.1 K/UL (ref 0–0.1)
BASOPHILS NFR BLD: 1 % (ref 0–2)
BUN SERPL-MCNC: 6 MG/DL (ref 7–18)
BUN/CREAT SERPL: 12 (ref 12–20)
CALCIUM SERPL-MCNC: 9.4 MG/DL (ref 8.5–10.1)
CHLORIDE SERPL-SCNC: 93 MMOL/L (ref 100–111)
CO2 SERPL-SCNC: 27 MMOL/L (ref 21–32)
CREAT SERPL-MCNC: 0.5 MG/DL (ref 0.6–1.3)
DIFFERENTIAL METHOD BLD: ABNORMAL
EOSINOPHIL # BLD: 0.2 K/UL (ref 0–0.4)
EOSINOPHIL NFR BLD: 2 % (ref 0–5)
ERYTHROCYTE [DISTWIDTH] IN BLOOD BY AUTOMATED COUNT: 12.6 % (ref 11.6–14.5)
GLUCOSE SERPL-MCNC: 103 MG/DL (ref 74–99)
HCT VFR BLD AUTO: 46.1 % (ref 36–48)
HGB BLD-MCNC: 15.9 G/DL (ref 13–16)
IMM GRANULOCYTES # BLD AUTO: 0 K/UL (ref 0–0.04)
IMM GRANULOCYTES NFR BLD AUTO: 0 % (ref 0–0.5)
INR PPP: 0.9 (ref 0.8–1.2)
LYMPHOCYTES # BLD: 1.8 K/UL (ref 0.9–3.6)
LYMPHOCYTES NFR BLD: 27 % (ref 21–52)
MCH RBC QN AUTO: 31.5 PG (ref 24–34)
MCHC RBC AUTO-ENTMCNC: 34.5 G/DL (ref 31–37)
MCV RBC AUTO: 91.3 FL (ref 78–100)
MONOCYTES # BLD: 1.1 K/UL (ref 0.05–1.2)
MONOCYTES NFR BLD: 17 % (ref 3–10)
NEUTS SEG # BLD: 3.5 K/UL (ref 1.8–8)
NEUTS SEG NFR BLD: 53 % (ref 40–73)
NRBC # BLD: 0 K/UL (ref 0–0.01)
NRBC BLD-RTO: 0 PER 100 WBC
PLATELET # BLD AUTO: 369 K/UL (ref 135–420)
PMV BLD AUTO: 9 FL (ref 9.2–11.8)
POTASSIUM SERPL-SCNC: 4.5 MMOL/L (ref 3.5–5.5)
PROTHROMBIN TIME: 12.9 SEC (ref 11.5–15.2)
RBC # BLD AUTO: 5.05 M/UL (ref 4.35–5.65)
SODIUM SERPL-SCNC: 127 MMOL/L (ref 136–145)
WBC # BLD AUTO: 6.6 K/UL (ref 4.6–13.2)

## 2023-07-06 PROCEDURE — 7100000011 HC PHASE II RECOVERY - ADDTL 15 MIN

## 2023-07-06 PROCEDURE — 2500000003 HC RX 250 WO HCPCS: Performed by: RADIOLOGY

## 2023-07-06 PROCEDURE — 6360000002 HC RX W HCPCS: Performed by: NURSE ANESTHETIST, CERTIFIED REGISTERED

## 2023-07-06 PROCEDURE — 88311 DECALCIFY TISSUE: CPT

## 2023-07-06 PROCEDURE — 80048 BASIC METABOLIC PNL TOTAL CA: CPT

## 2023-07-06 PROCEDURE — 85025 COMPLETE CBC W/AUTO DIFF WBC: CPT

## 2023-07-06 PROCEDURE — 85610 PROTHROMBIN TIME: CPT

## 2023-07-06 PROCEDURE — 7100000010 HC PHASE II RECOVERY - FIRST 15 MIN

## 2023-07-06 PROCEDURE — 6360000004 HC RX CONTRAST MEDICATION: Performed by: RADIOLOGY

## 2023-07-06 PROCEDURE — 85730 THROMBOPLASTIN TIME PARTIAL: CPT

## 2023-07-06 PROCEDURE — 3700000001 HC ADD 15 MINUTES (ANESTHESIA)

## 2023-07-06 PROCEDURE — 88307 TISSUE EXAM BY PATHOLOGIST: CPT

## 2023-07-06 PROCEDURE — 3700000000 HC ANESTHESIA ATTENDED CARE

## 2023-07-06 PROCEDURE — 2580000003 HC RX 258: Performed by: RADIOLOGY

## 2023-07-06 PROCEDURE — C1713 ANCHOR/SCREW BN/BN,TIS/BN: HCPCS

## 2023-07-06 RX ORDER — LIDOCAINE HYDROCHLORIDE 10 MG/ML
30 INJECTION, SOLUTION EPIDURAL; INFILTRATION; INTRACAUDAL; PERINEURAL ONCE
Status: COMPLETED | OUTPATIENT
Start: 2023-07-06 | End: 2023-07-06

## 2023-07-06 RX ORDER — MIDAZOLAM HYDROCHLORIDE 1 MG/ML
INJECTION INTRAMUSCULAR; INTRAVENOUS PRN
Status: DISCONTINUED | OUTPATIENT
Start: 2023-07-06 | End: 2023-07-06 | Stop reason: SDUPTHER

## 2023-07-06 RX ORDER — SODIUM CHLORIDE 0.9 % (FLUSH) 0.9 %
5-40 SYRINGE (ML) INJECTION PRN
Status: DISCONTINUED | OUTPATIENT
Start: 2023-07-06 | End: 2023-07-10 | Stop reason: HOSPADM

## 2023-07-06 RX ORDER — FENTANYL CITRATE 50 UG/ML
25 INJECTION, SOLUTION INTRAMUSCULAR; INTRAVENOUS EVERY 5 MIN PRN
Status: DISCONTINUED | OUTPATIENT
Start: 2023-07-06 | End: 2023-07-10 | Stop reason: HOSPADM

## 2023-07-06 RX ORDER — PROPOFOL 10 MG/ML
INJECTION, EMULSION INTRAVENOUS CONTINUOUS PRN
Status: DISCONTINUED | OUTPATIENT
Start: 2023-07-06 | End: 2023-07-06 | Stop reason: SDUPTHER

## 2023-07-06 RX ORDER — ONDANSETRON 2 MG/ML
4 INJECTION INTRAMUSCULAR; INTRAVENOUS
Status: COMPLETED | OUTPATIENT
Start: 2023-07-06 | End: 2023-07-06

## 2023-07-06 RX ORDER — SODIUM CHLORIDE, SODIUM LACTATE, POTASSIUM CHLORIDE, CALCIUM CHLORIDE 600; 310; 30; 20 MG/100ML; MG/100ML; MG/100ML; MG/100ML
INJECTION, SOLUTION INTRAVENOUS CONTINUOUS
Status: DISCONTINUED | OUTPATIENT
Start: 2023-07-06 | End: 2023-07-10 | Stop reason: HOSPADM

## 2023-07-06 RX ORDER — SODIUM CHLORIDE 0.9 % (FLUSH) 0.9 %
5-40 SYRINGE (ML) INJECTION EVERY 12 HOURS SCHEDULED
Status: DISCONTINUED | OUTPATIENT
Start: 2023-07-06 | End: 2023-07-10 | Stop reason: HOSPADM

## 2023-07-06 RX ORDER — CEFAZOLIN SODIUM 1 G/3ML
INJECTION, POWDER, FOR SOLUTION INTRAMUSCULAR; INTRAVENOUS PRN
Status: DISCONTINUED | OUTPATIENT
Start: 2023-07-06 | End: 2023-07-06 | Stop reason: SDUPTHER

## 2023-07-06 RX ORDER — SODIUM CHLORIDE 9 MG/ML
INJECTION, SOLUTION INTRAVENOUS CONTINUOUS
Status: DISCONTINUED | OUTPATIENT
Start: 2023-07-06 | End: 2023-07-10 | Stop reason: HOSPADM

## 2023-07-06 RX ORDER — FENTANYL CITRATE 50 UG/ML
INJECTION, SOLUTION INTRAMUSCULAR; INTRAVENOUS PRN
Status: DISCONTINUED | OUTPATIENT
Start: 2023-07-06 | End: 2023-07-06 | Stop reason: SDUPTHER

## 2023-07-06 RX ORDER — SODIUM CHLORIDE 9 MG/ML
INJECTION, SOLUTION INTRAVENOUS PRN
Status: DISCONTINUED | OUTPATIENT
Start: 2023-07-06 | End: 2023-07-10 | Stop reason: HOSPADM

## 2023-07-06 RX ADMIN — ONDANSETRON 4 MG: 2 INJECTION INTRAMUSCULAR; INTRAVENOUS at 13:14

## 2023-07-06 RX ADMIN — FENTANYL CITRATE 25 MCG: 50 INJECTION, SOLUTION INTRAMUSCULAR; INTRAVENOUS at 13:08

## 2023-07-06 RX ADMIN — IOHEXOL 50 ML: 300 INJECTION, SOLUTION INTRAVENOUS at 11:55

## 2023-07-06 RX ADMIN — FENTANYL CITRATE 50 MCG: 50 INJECTION INTRAMUSCULAR; INTRAVENOUS at 11:13

## 2023-07-06 RX ADMIN — PROPOFOL 25 MG: 10 INJECTION, EMULSION INTRAVENOUS at 11:17

## 2023-07-06 RX ADMIN — LIDOCAINE HYDROCHLORIDE 30 ML: 10 INJECTION, SOLUTION EPIDURAL; INFILTRATION; INTRACAUDAL; PERINEURAL at 11:55

## 2023-07-06 RX ADMIN — SODIUM CHLORIDE: 900 INJECTION, SOLUTION INTRAVENOUS at 11:09

## 2023-07-06 RX ADMIN — FENTANYL CITRATE 25 MCG: 50 INJECTION INTRAMUSCULAR; INTRAVENOUS at 11:41

## 2023-07-06 RX ADMIN — CEFAZOLIN 2 G: 330 INJECTION, POWDER, FOR SOLUTION INTRAMUSCULAR; INTRAVENOUS at 11:22

## 2023-07-06 RX ADMIN — MIDAZOLAM 1 MG: 1 INJECTION, SOLUTION INTRAMUSCULAR; INTRAVENOUS at 11:11

## 2023-07-06 ASSESSMENT — PAIN SCALES - GENERAL
PAINLEVEL_OUTOF10: 0
PAINLEVEL_OUTOF10: 0
PAINLEVEL_OUTOF10: 6
PAINLEVEL_OUTOF10: 0

## 2023-07-06 ASSESSMENT — COPD QUESTIONNAIRES: CAT_SEVERITY: MODERATE

## 2023-07-06 ASSESSMENT — LIFESTYLE VARIABLES: SMOKING_STATUS: 1

## 2023-07-06 ASSESSMENT — PAIN - FUNCTIONAL ASSESSMENT: PAIN_FUNCTIONAL_ASSESSMENT: ACTIVITIES ARE NOT PREVENTED

## 2023-07-06 ASSESSMENT — PAIN DESCRIPTION - DESCRIPTORS: DESCRIPTORS: ACHING

## 2023-07-06 ASSESSMENT — PAIN DESCRIPTION - ORIENTATION: ORIENTATION: LEFT;POSTERIOR;UPPER

## 2023-07-06 ASSESSMENT — PAIN DESCRIPTION - LOCATION: LOCATION: BACK

## 2023-07-06 NOTE — ANESTHESIA POSTPROCEDURE EVALUATION
Department of Anesthesiology  Postprocedure Note    Patient: Julissa Berry  MRN: [id number]  YOB: 1955  Date of evaluation: 7/6/2023      Procedure Summary     Date: 07/06/23 Room / Location: [address] [place] [address] ANGIO IR; [facility] CATH LAB    Anesthesia Start: 1106 Anesthesia Stop: 7112    Procedure: IR KYPHOPLASTY THORACIC FIRST LEVEL Diagnosis: Crush fracture of vertebra due to osteoporosis, initial encounter ([address] )    Scheduled Providers:  Responsible Provider: Kailee Jarrell MD    Anesthesia Type: MAC ASA Status: 3          Anesthesia Type: MAC    Yanique Phase I: Yanique Score: 9    Yanique Phase II:        Anesthesia Post Evaluation    Patient location during evaluation: PACU  Patient participation: complete - patient participated  Level of consciousness: sleepy but conscious  Pain score: 0  Airway patency: patent  Nausea & Vomiting: no nausea and no vomiting  Complications: no  Cardiovascular status: blood pressure returned to baseline  Respiratory status: acceptable  Hydration status: euvolemic

## 2023-07-06 NOTE — H&P
History and Physical    Patient: Jose Kern           Sex: male       DOA: 7/6/2023  YOB: 1955      Age:  79 y.o.     LOS:  LOS: 0 days        HPI:     Jose Kern is a 79 y.o. male with COPD and acute compression fracture of L1 and pain refractory to conservative management here for a lumbar one kyphoplasty with biopsy. Past Medical History:   Diagnosis Date    Ankle fracture, left 10/19/2008    Cough     DJD (degenerative joint disease) of knee 11/10/2010    ED (erectile dysfunction) 11/10/2010    Fracture of left tibia 10/19/2008    Severe Grade IIIB    GERD (gastroesophageal reflux disease) 11/10/2010    denies    Hearing loss     Left ankle pain     Left ankle sprain     Left ankle strain     Pain with urination     Pneumonia     Sinusitis 11/10/2010    Wears glasses        Past Surgical History:   Procedure Laterality Date    ANESTH,SURGERY OF SHOULDER      11/27/17    ANKLE FRACTURE SURGERY  10/20/2008    ORTHOPEDIC SURGERY      Several surgeries in the past which required ORIF of the distal tibia posterior approach and bone grafting x2 separate surgeries       Family History   Problem Relation Age of Onset    Migraines Mother     Osteoarthritis Other     Hypertension Other        Social History     Socioeconomic History    Marital status:      Spouse name: None    Number of children: None    Years of education: None    Highest education level: None   Tobacco Use    Smoking status: Every Day     Packs/day: 1.50     Types: Cigarettes    Smokeless tobacco: Never   Substance and Sexual Activity    Alcohol use: Yes     Alcohol/week: 0.8 - 5.0 standard drinks    Drug use: No       Prior to Admission medications    Medication Sig Start Date End Date Taking?  Authorizing Provider   ibuprofen (ADVIL;MOTRIN) 800 MG tablet Take 1 tablet by mouth every 8 hours as needed for Pain 3/29/23   Caleb Montes MD   cyclobenzaprine (FLEXERIL) 10 MG tablet Take 1 tablet

## 2023-07-06 NOTE — PROCEDURES
RADIOLOGY POST PROCEDURE NOTE     July 6, 2023       12:17 PM     Preoperative Diagnosis:   Osteoporotic symptomatic L1 compression fracture. Pathologic fracture cannot ruled out. Postoperative Diagnosis:  Same. :  Dr. Jasiel Cordon    Assistant:  None. Type of Anesthesia: 1% local lidocaine and IV deep sedation per anesthesia. Procedure/Description: Image guided L1 kyphoplasty with biopsy. Findings:   No bleeding. Estimated blood Loss: Minimal    Specimen Removed: Yes    Blood transfusions:  None. Implants:  None. Complications: None    Condition: Stable    Discharge Plan: Discharge home if stable and no bleeding in 3 hours. Resume blood thinners if any in 24 hours.     David Grant MD

## 2023-07-06 NOTE — DISCHARGE INSTRUCTIONS
DISCHARGE SUMMARY from Nurse    PATIENT INSTRUCTIONS:    After general anesthesia or intravenous sedation, for 24 hours or while taking prescription Narcotics:  Limit your activities  Do not drive and operate hazardous machinery  Do not make important personal or business decisions  Do  not drink alcoholic beverages  If you have not urinated within 8 hours after discharge, please contact your surgeon on call. Report the following to your surgeon:  Excessive pain, swelling, redness or odor of or around the surgical area  Temperature over 100.5  Nausea and vomiting lasting longer than 4 hours or if unable to take medications  Any signs of decreased circulation or nerve impairment to extremity: change in color, persistent  numbness, tingling, coldness or increase pain  Any questions    These are general instructions for a healthy lifestyle:    No smoking/ No tobacco products/ Avoid exposure to second hand smoke  Surgeon General's Warning:  Quitting smoking now greatly reduces serious risk to your health. Obesity, smoking, and sedentary lifestyle greatly increases your risk for illness    A healthy diet, regular physical exercise & weight monitoring are important for maintaining a healthy lifestyle    You may be retaining fluid if you have a history of heart failure or if you experience any of the following symptoms:  Weight gain of 3 pounds or more overnight or 5 pounds in a week, increased swelling in our hands or feet or shortness of breath while lying flat in bed. Please call your doctor as soon as you notice any of these symptoms; do not wait until your next office visit. The discharge information has been reviewed with the patient and friend/family member. The patient and the friend/family member verbalized understanding.   Discharge medications reviewed with the patient and friend/family member and appropriate educational materials and side effects teaching were

## 2023-07-07 ENCOUNTER — CLINICAL DOCUMENTATION (OUTPATIENT)
Facility: HOSPITAL | Age: 68
End: 2023-07-07

## 2023-07-24 RX ORDER — FLUTICASONE FUROATE, UMECLIDINIUM BROMIDE AND VILANTEROL TRIFENATATE 200; 62.5; 25 UG/1; UG/1; UG/1
POWDER RESPIRATORY (INHALATION)
Qty: 1 EACH | Refills: 5 | Status: SHIPPED | OUTPATIENT
Start: 2023-07-24

## 2023-08-07 ENCOUNTER — OFFICE VISIT (OUTPATIENT)
Age: 68
End: 2023-08-07
Payer: COMMERCIAL

## 2023-08-07 VITALS
BODY MASS INDEX: 14.93 KG/M2 | WEIGHT: 110.2 LBS | TEMPERATURE: 97.7 F | HEART RATE: 80 BPM | HEIGHT: 72 IN | RESPIRATION RATE: 18 BRPM

## 2023-08-07 DIAGNOSIS — Z98.890 S/P KYPHOPLASTY: ICD-10-CM

## 2023-08-07 DIAGNOSIS — M51.36 DDD (DEGENERATIVE DISC DISEASE), LUMBAR: Primary | ICD-10-CM

## 2023-08-07 PROCEDURE — 99213 OFFICE O/P EST LOW 20 MIN: CPT | Performed by: PHYSICAL MEDICINE & REHABILITATION

## 2023-08-07 PROCEDURE — 1123F ACP DISCUSS/DSCN MKR DOCD: CPT | Performed by: PHYSICAL MEDICINE & REHABILITATION

## 2023-08-07 NOTE — PROGRESS NOTES
[facility]    [address], [address] [place] [address], [facility] [address]  Phone: [phone]  Fax: [phone]        Angelo Florida  : 1955  PCP: Mary Kay Sweeney MD    PROGRESS NOTE      ASSESSMENT AND PLAN    Veena Fernandez was seen today for lower back pain. Diagnoses and all orders for this visit:    DDD (degenerative disc disease), lumbar  -     Back Brace    S/P kyphoplasty, L1, 2023        Bassam Villela is a 79 y.o. male status post kyphoplasty L1 last month. His primary complaint now is lumbosacral.  No radiculopathy. Continue Lidoderm patches during the day, on 12 hours, off 12 hours  He may combine this with as needed ibuprofen. And Tylenol. Discussed injections for resistant pain. He would like to hold off. He had no benefit with a prior knee cortisone injection. .     Follow-up and Dispositions    Return if symptoms worsen or fail to improve. HISTORY OF PRESENT ILLNESS      Bassam Villela is a 79 y.o. male presents for follow up of comp fx. Since last visit, CT reviewed and referred to IR for fracture. He was unable to obtain MRI due to a previous bone growth stimulator left ankle. Had L1 kypho 23 with benefit. He still feels very limited due to back pain when he is trying to stand make himself some eggs. He really cannot walk outside the home. Denies any numbness or tingling in his lower extremities. AMB PAIN ASSESSMENT 2023   Location of Pain Back   Severity of Pain 8   Quality of Pain -   Duration of Pain Persistent   Frequency of Pain Constant   Date Pain First Started -   Aggravating Factors Standing;Walking   Limiting Behavior Yes   Relieving Factors Rest   Result of Injury -           Investigations:  2023 SPECT scan: Significant uptake L1. No uptake in other compression deformity levels. 2023 lumbar CT: Multiple compression deformities. New deformity L1. Chronic T12 deformity.

## 2023-08-16 RX ORDER — FLUTICASONE PROPIONATE 50 MCG
SPRAY, SUSPENSION (ML) NASAL
Qty: 16 G | Refills: 5 | Status: SHIPPED | OUTPATIENT
Start: 2023-08-16

## 2023-08-16 NOTE — TELEPHONE ENCOUNTER
PCP: ERIKA CASTILLO MD    Last refill per chart:    fluticasone (FLONASE ALLERGY RELIEF) 50 MCG/ACT nasal spray 2 spray, Each Nostril, DAILY Edit       Summary: 2 sprays by Each Nostril route daily, Disp-1 each, R-2  Normal   Dose, Frequency: 2 spray, DAILY  Start: 3/29/2023  Ord/Sold: 3/29/2023 (O)  Report  Taking:   Long-term:   Pharmacy: [address] [place] [address] #[id number] - [place], [address] [place] [address] [phone]  Med Dose History       Patient Si sprays by Each Nostril route daily       Ordered on: 3/29/2023       Authorized by: ERIKA Pan       Dispense: 1 each       Refills: 2 ordered        No future appointments.

## 2023-08-28 ENCOUNTER — TELEPHONE (OUTPATIENT)
Age: 68
End: 2023-08-28

## 2023-08-28 DIAGNOSIS — R05.1 ACUTE COUGH: Primary | ICD-10-CM

## 2023-08-28 NOTE — TELEPHONE ENCOUNTER
Pt calling re: cough, phlegm going on several months. Rhodia Deed was mostly clear to white. Since yesterday cough had some blood. Today phlegm seems mostly blood. Pharmacy is [address] [place] [address]

## 2023-08-29 RX ORDER — DOXYCYCLINE HYCLATE 100 MG/1
100 CAPSULE ORAL 2 TIMES DAILY
Qty: 20 CAPSULE | Refills: 0 | Status: SHIPPED | OUTPATIENT
Start: 2023-08-29 | End: 2023-09-08

## 2023-08-29 RX ORDER — GUAIFENESIN AND CODEINE PHOSPHATE 100; 10 MG/5ML; MG/5ML
10 SOLUTION ORAL EVERY 6 HOURS PRN
Qty: 120 ML | Refills: 0 | Status: SHIPPED | OUTPATIENT
Start: 2023-08-29 | End: 2023-09-05

## 2023-08-29 NOTE — TELEPHONE ENCOUNTER
Called and spoke to patient, name and date of birth verified. Patient given Dr. Liberty Matute message and voiced understanding.

## 2023-09-05 RX ORDER — IBUPROFEN 800 MG/1
800 TABLET ORAL EVERY 8 HOURS PRN
Qty: 90 TABLET | Refills: 1 | Status: SHIPPED | OUTPATIENT
Start: 2023-09-05

## 2023-10-19 ENCOUNTER — TELEPHONE (OUTPATIENT)
Age: 68
End: 2023-10-19

## 2023-10-19 RX ORDER — ALBUTEROL SULFATE 90 UG/1
2 AEROSOL, METERED RESPIRATORY (INHALATION) EVERY 4 HOURS PRN
Qty: 18 G | Refills: 3 | Status: SHIPPED | OUTPATIENT
Start: 2023-10-19

## 2023-10-19 NOTE — TELEPHONE ENCOUNTER
Patient states that last night he had a hard time breathing to the point of  he thought he was going to have to go the hospital.  His wife found and old inhaler of albuterol and he used it and it helped. He was not sure how old it was or even when he got it but he wants another Rx for it sent to the pharmacy, but also concerned that he had that episode. He said that normally the Trelegy manages the issue, but he was pretty scared last night.

## 2023-10-20 NOTE — TELEPHONE ENCOUNTER
Patient advised Albuteral was sent to his pharmacy and to call us for an appt if he continues having trouble with his breathing.

## 2023-10-26 ENCOUNTER — TELEPHONE (OUTPATIENT)
Age: 68
End: 2023-10-26

## 2023-10-26 NOTE — TELEPHONE ENCOUNTER
----- Message from Vane Huang [place] sent at 10/26/2023  1:10 PM EDT -----  Subject: Message to Provider    QUESTIONS  Information for Provider? pt calling in to return phone call to james ERWIN, please advise and call back, tried to reach office but there was no   answer. thanks  ---------------------------------------------------------------------------  --------------  Richard Basurto INFO  [phone]; OK to leave message on voicemail  ---------------------------------------------------------------------------  --------------  SCRIPT ANSWERS  Relationship to Patient?  Self

## 2023-10-26 NOTE — TELEPHONE ENCOUNTER
----- Message from Volodymyr Pires sent at 10/26/2023 11:08 AM EDT -----  Subject: Appointment Request    Reason for Call: Established Patient Appointment needed: Routine Medicare   AWV    QUESTIONS    Reason for appointment request? Available appointments did not meet   patient need     Additional Information for Provider? Pt is scheduled for 11/07 for an AWV,   he is unable to keep that appt but thinks that Dr. Lianet Mcclure would like to see   him soon due to past illnesses.  Please advise.   ---------------------------------------------------------------------------  --------------  Flash NICHOLAS  [phone]; OK to leave message on voicemail  ---------------------------------------------------------------------------  --------------  SCRIPT ANSWERS

## 2023-10-27 ENCOUNTER — HOSPITAL ENCOUNTER (EMERGENCY)
Facility: HOSPITAL | Age: 68
Discharge: HOME | End: 2023-10-27
Attending: EMERGENCY MEDICINE
Payer: COMMERCIAL

## 2023-10-27 ENCOUNTER — APPOINTMENT (OUTPATIENT)
Facility: HOSPITAL | Age: 68
End: 2023-10-27
Payer: COMMERCIAL

## 2023-10-27 VITALS
SYSTOLIC BLOOD PRESSURE: 132 MMHG | HEART RATE: 95 BPM | DIASTOLIC BLOOD PRESSURE: 73 MMHG | HEIGHT: 72 IN | WEIGHT: 112 LBS | TEMPERATURE: 97.8 F | RESPIRATION RATE: 23 BRPM | OXYGEN SATURATION: 92 % | BODY MASS INDEX: 15.17 KG/M2

## 2023-10-27 DIAGNOSIS — J44.1 COPD EXACERBATION (HCC): Primary | ICD-10-CM

## 2023-10-27 LAB
ANION GAP SERPL CALC-SCNC: 4 MMOL/L (ref 3–18)
BASOPHILS # BLD: 0.1 K/UL (ref 0–0.1)
BASOPHILS NFR BLD: 1 % (ref 0–2)
BUN SERPL-MCNC: 5 MG/DL (ref 7–18)
BUN/CREAT SERPL: 12 (ref 12–20)
CALCIUM SERPL-MCNC: 8.6 MG/DL (ref 8.5–10.1)
CHLORIDE SERPL-SCNC: 93 MMOL/L (ref 100–111)
CO2 SERPL-SCNC: 29 MMOL/L (ref 21–32)
CREAT SERPL-MCNC: 0.43 MG/DL (ref 0.6–1.3)
D DIMER PPP FEU-MCNC: 0.61 UG/ML(FEU)
DIFFERENTIAL METHOD BLD: ABNORMAL
EOSINOPHIL # BLD: 0.2 K/UL (ref 0–0.4)
EOSINOPHIL NFR BLD: 2 % (ref 0–5)
ERYTHROCYTE [DISTWIDTH] IN BLOOD BY AUTOMATED COUNT: 13 % (ref 11.6–14.5)
GLUCOSE SERPL-MCNC: 102 MG/DL (ref 74–99)
HCT VFR BLD AUTO: 38.2 % (ref 36–48)
HGB BLD-MCNC: 13 G/DL (ref 13–16)
IMM GRANULOCYTES # BLD AUTO: 0 K/UL (ref 0–0.04)
IMM GRANULOCYTES NFR BLD AUTO: 0 % (ref 0–0.5)
LYMPHOCYTES # BLD: 2 K/UL (ref 0.9–3.6)
LYMPHOCYTES NFR BLD: 19 % (ref 21–52)
MAGNESIUM SERPL-MCNC: 1.9 MG/DL (ref 1.6–2.6)
MCH RBC QN AUTO: 30.9 PG (ref 24–34)
MCHC RBC AUTO-ENTMCNC: 34 G/DL (ref 31–37)
MCV RBC AUTO: 90.7 FL (ref 78–100)
MONOCYTES # BLD: 0.9 K/UL (ref 0.05–1.2)
MONOCYTES NFR BLD: 9 % (ref 3–10)
NEUTS SEG # BLD: 7.2 K/UL (ref 1.8–8)
NEUTS SEG NFR BLD: 69 % (ref 40–73)
NRBC # BLD: 0 K/UL (ref 0–0.01)
NRBC BLD-RTO: 0 PER 100 WBC
NT PRO BNP: 434 PG/ML (ref 0–900)
PLATELET # BLD AUTO: 416 K/UL (ref 135–420)
PMV BLD AUTO: 9.2 FL (ref 9.2–11.8)
POTASSIUM SERPL-SCNC: 4.1 MMOL/L (ref 3.5–5.5)
RBC # BLD AUTO: 4.21 M/UL (ref 4.35–5.65)
SODIUM SERPL-SCNC: 126 MMOL/L (ref 136–145)
TROPONIN I SERPL HS-MCNC: 5 NG/L (ref 0–78)
WBC # BLD AUTO: 10.5 K/UL (ref 4.6–13.2)

## 2023-10-27 PROCEDURE — 85379 FIBRIN DEGRADATION QUANT: CPT

## 2023-10-27 PROCEDURE — 93005 ELECTROCARDIOGRAM TRACING: CPT | Performed by: EMERGENCY MEDICINE

## 2023-10-27 PROCEDURE — 83880 ASSAY OF NATRIURETIC PEPTIDE: CPT

## 2023-10-27 PROCEDURE — 80048 BASIC METABOLIC PNL TOTAL CA: CPT

## 2023-10-27 PROCEDURE — 83735 ASSAY OF MAGNESIUM: CPT

## 2023-10-27 PROCEDURE — 71045 X-RAY EXAM CHEST 1 VIEW: CPT

## 2023-10-27 PROCEDURE — 84484 ASSAY OF TROPONIN QUANT: CPT

## 2023-10-27 PROCEDURE — 85025 COMPLETE CBC W/AUTO DIFF WBC: CPT

## 2023-10-27 PROCEDURE — 94760 N-INVAS EAR/PLS OXIMETRY 1: CPT

## 2023-10-27 PROCEDURE — 6360000002 HC RX W HCPCS: Performed by: EMERGENCY MEDICINE

## 2023-10-27 PROCEDURE — 6370000000 HC RX 637 (ALT 250 FOR IP): Performed by: EMERGENCY MEDICINE

## 2023-10-27 PROCEDURE — 99284 EMERGENCY DEPT VISIT MOD MDM: CPT

## 2023-10-27 RX ORDER — IPRATROPIUM BROMIDE AND ALBUTEROL SULFATE 2.5; .5 MG/3ML; MG/3ML
1 SOLUTION RESPIRATORY (INHALATION)
Status: COMPLETED | OUTPATIENT
Start: 2023-10-27 | End: 2023-10-27

## 2023-10-27 RX ORDER — ALBUTEROL SULFATE 2.5 MG/3ML
2.5 SOLUTION RESPIRATORY (INHALATION)
Status: COMPLETED | OUTPATIENT
Start: 2023-10-27 | End: 2023-10-27

## 2023-10-27 RX ORDER — DOXYCYCLINE HYCLATE 100 MG
100 TABLET ORAL 2 TIMES DAILY
Qty: 14 TABLET | Refills: 0 | Status: SHIPPED | OUTPATIENT
Start: 2023-10-27 | End: 2023-11-03

## 2023-10-27 RX ORDER — DEXAMETHASONE SODIUM PHOSPHATE 4 MG/ML
10 INJECTION, SOLUTION INTRA-ARTICULAR; INTRALESIONAL; INTRAMUSCULAR; INTRAVENOUS; SOFT TISSUE
Status: COMPLETED | OUTPATIENT
Start: 2023-10-27 | End: 2023-10-27

## 2023-10-27 RX ADMIN — ALBUTEROL SULFATE 2.5 MG: 2.5 SOLUTION RESPIRATORY (INHALATION) at 20:07

## 2023-10-27 RX ADMIN — IPRATROPIUM BROMIDE AND ALBUTEROL SULFATE 1 DOSE: .5; 3 SOLUTION RESPIRATORY (INHALATION) at 20:07

## 2023-10-27 RX ADMIN — DEXAMETHASONE SODIUM PHOSPHATE 10 MG: 4 INJECTION INTRA-ARTICULAR; INTRALESIONAL; INTRAMUSCULAR; INTRAVENOUS; SOFT TISSUE at 20:10

## 2023-10-27 ASSESSMENT — ENCOUNTER SYMPTOMS
ABDOMINAL PAIN: 0
VOMITING: 0
COUGH: 1
SHORTNESS OF BREATH: 1
WHEEZING: 0
NAUSEA: 0
DIARRHEA: 0
CHEST TIGHTNESS: 0

## 2023-10-27 ASSESSMENT — PAIN - FUNCTIONAL ASSESSMENT: PAIN_FUNCTIONAL_ASSESSMENT: NONE - DENIES PAIN

## 2023-10-27 NOTE — ED PROVIDER NOTES
[facility] EMERGENCY DEPT  EMERGENCY DEPARTMENT ENCOUNTER      Pt Name: Mariama Pedro  MRN: [id number]  [address] [facility] 1955  Date of evaluation: 10/27/2023  Provider: Diego Garcia MD    CHIEF COMPLAINT       Chief Complaint   Patient presents with    Shortness of Breath         HISTORY OF PRESENT ILLNESS   (Location/Symptom, Timing/Onset, Context/Setting, Quality, Duration, Modifying Factors, Severity)  Note limiting factors. Mariama Pedro is a 79 y.o. male who presents to the emergency department ***     61-year-old male past medical history of COPD GERD degenerative joint disease presents to the emergency department with shortness of breath for 2 days. Patient is on Trelegy which he ran out of the medication. Patient has not taken in 2 days. He has albuterol at home. He states he has not used in the past but when he uses it it makes him feel better. Dr. Keisha Puente recently sent a prescription to the house for him. Patient is not on steroids. Patient has no COVID concerns. No nausea no vomiting no fevers or chills. No abdominal pain. No diarrhea. Patient states he has shortness of breath at rest but primarily with exertion. No chest pain. He is an active smoker. His wife is at the bedside during interview. No other issues expressed. The history is provided by the patient, the spouse and medical records. No  was used. Nursing Notes were reviewed. REVIEW OF SYSTEMS    (2-9 systems for level 4, 10 or more for level 5)     Review of Systems   Constitutional: Negative. Respiratory:  Positive for cough and shortness of breath. Negative for chest tightness and wheezing. Cardiovascular:  Negative for chest pain, palpitations and leg swelling. Gastrointestinal:  Negative for abdominal pain, diarrhea, nausea and vomiting. Hematological: Negative. Except as noted above the remainder of the review of systems was reviewed and negative.        PAST EKG's are interpreted by the Emergency Department Physician who either signs or Co-signs this chart in the absence of a cardiologist.      RADIOLOGY:   Non-plain film images such as CT, Ultrasound and MRI are read by the radiologist. Plain radiographic images are visualized and preliminarily interpreted by the emergency physician with the below findings:      Interpretation per the Radiologist below, if available at the time of this note:    XR CHEST PORTABLE   Final Result   1. Possible subsegmental small nodular airspace infiltrate right lung base. 2. Chronic right apical scarring with associated cavity or bleb and volume loss   as previous. 3. Large hiatal hernia. ED BEDSIDE ULTRASOUND:   Performed by ED Physician - none    LABS:  Labs Reviewed   CBC WITH AUTO DIFFERENTIAL - Abnormal; Notable for the following components:       Result Value    RBC 4.21 (*)     Lymphocytes % 19 (*)     All other components within normal limits   BASIC METABOLIC PANEL - Abnormal; Notable for the following components:    Sodium 126 (*)     Chloride 93 (*)     Glucose 102 (*)     BUN 5 (*)     Creatinine 0.43 (*)     All other components within normal limits   D-DIMER, QUANTITATIVE - Abnormal; Notable for the following components:    D-Dimer, Quant 0.61 (*)     All other components within normal limits   TROPONIN   BRAIN NATRIURETIC PEPTIDE   MAGNESIUM       All other labs were within normal range or not returned as of this dictation. EMERGENCY DEPARTMENT COURSE and DIFFERENTIAL DIAGNOSIS/MDM:   Vitals:    Vitals:    10/27/23 1846 10/27/23 2000 10/27/23 2115 10/27/23 2145   BP:  (!) 141/80 137/78 132/73   Pulse: (!) 112 96 94 95   Resp: 24 24 23 23   Temp:       TempSrc:       SpO2:       Weight:       Height:             Medical Decision Making  Chest x-ray read by me was unremarkable for an acute process. EKG was sinus tachycardic at 108 normal axis normal intervals.   Patient is much improved after Decadron and

## 2023-10-27 NOTE — ED TRIAGE NOTES
Patient states has been  having increase SOB for 2 days, states ran out of trulicity which states was helping, but has albuterol.  Patient noted to have coarse crackles

## 2023-10-28 LAB
EKG ATRIAL RATE: 108 BPM
EKG DIAGNOSIS: NORMAL
EKG P AXIS: 71 DEGREES
EKG P-R INTERVAL: 144 MS
EKG Q-T INTERVAL: 324 MS
EKG QRS DURATION: 86 MS
EKG QTC CALCULATION (BAZETT): 434 MS
EKG R AXIS: -9 DEGREES
EKG T AXIS: 74 DEGREES
EKG VENTRICULAR RATE: 108 BPM

## 2023-10-28 PROCEDURE — 93010 ELECTROCARDIOGRAM REPORT: CPT | Performed by: INTERNAL MEDICINE

## 2023-10-28 NOTE — ED NOTES
Pt able to ambulate using cane with steady gait without c/o shortness of breath.       Rito Berrios RN  10/27/23 4620

## 2023-10-28 NOTE — DISCHARGE INSTRUCTIONS
Come back if you get worse. Follow-up without fail. Take medication as prescribed. Use albuterol every 4-6 hours.

## 2023-11-14 ENCOUNTER — OFFICE VISIT (OUTPATIENT)
Age: 68
End: 2023-11-14
Payer: COMMERCIAL

## 2023-11-14 VITALS
HEART RATE: 106 BPM | SYSTOLIC BLOOD PRESSURE: 150 MMHG | WEIGHT: 109 LBS | OXYGEN SATURATION: 95 % | RESPIRATION RATE: 16 BRPM | HEIGHT: 72 IN | TEMPERATURE: 98.9 F | BODY MASS INDEX: 14.76 KG/M2 | DIASTOLIC BLOOD PRESSURE: 105 MMHG

## 2023-11-14 DIAGNOSIS — I10 PRIMARY HYPERTENSION: Primary | ICD-10-CM

## 2023-11-14 DIAGNOSIS — J43.2 CENTRILOBULAR EMPHYSEMA (HCC): ICD-10-CM

## 2023-11-14 DIAGNOSIS — E87.1 CHRONIC HYPONATREMIA: ICD-10-CM

## 2023-11-14 DIAGNOSIS — F10.20 UNCOMPLICATED ALCOHOL DEPENDENCE (HCC): ICD-10-CM

## 2023-11-14 DIAGNOSIS — Z12.11 COLON CANCER SCREENING: ICD-10-CM

## 2023-11-14 DIAGNOSIS — R63.4 WEIGHT LOSS: ICD-10-CM

## 2023-11-14 DIAGNOSIS — M79.672 LEFT FOOT PAIN: ICD-10-CM

## 2023-11-14 DIAGNOSIS — L98.9 SKIN LESION: ICD-10-CM

## 2023-11-14 PROCEDURE — 3077F SYST BP >= 140 MM HG: CPT | Performed by: INTERNAL MEDICINE

## 2023-11-14 PROCEDURE — 99214 OFFICE O/P EST MOD 30 MIN: CPT | Performed by: INTERNAL MEDICINE

## 2023-11-14 PROCEDURE — 3080F DIAST BP >= 90 MM HG: CPT | Performed by: INTERNAL MEDICINE

## 2023-11-14 PROCEDURE — 1123F ACP DISCUSS/DSCN MKR DOCD: CPT | Performed by: INTERNAL MEDICINE

## 2023-11-14 SDOH — ECONOMIC STABILITY: HOUSING INSECURITY
IN THE LAST 12 MONTHS, WAS THERE A TIME WHEN YOU DID NOT HAVE A STEADY PLACE TO SLEEP OR SLEPT IN A SHELTER (INCLUDING NOW)?: PATIENT REFUSED

## 2023-11-14 SDOH — ECONOMIC STABILITY: INCOME INSECURITY: HOW HARD IS IT FOR YOU TO PAY FOR THE VERY BASICS LIKE FOOD, HOUSING, MEDICAL CARE, AND HEATING?: PATIENT DECLINED

## 2023-11-14 SDOH — ECONOMIC STABILITY: FOOD INSECURITY: WITHIN THE PAST 12 MONTHS, THE FOOD YOU BOUGHT JUST DIDN'T LAST AND YOU DIDN'T HAVE MONEY TO GET MORE.: PATIENT DECLINED

## 2023-11-14 SDOH — ECONOMIC STABILITY: FOOD INSECURITY: WITHIN THE PAST 12 MONTHS, YOU WORRIED THAT YOUR FOOD WOULD RUN OUT BEFORE YOU GOT MONEY TO BUY MORE.: PATIENT DECLINED

## 2023-11-15 ENCOUNTER — TELEPHONE (OUTPATIENT)
Age: 68
End: 2023-11-15

## 2023-11-15 NOTE — PROGRESS NOTES
Daja Holm (:  1955) is a 79 y.o. male established patient, here for evaluation of the following chief complaint(s): Annual Exam, Toe Pain (Right foot ), Cough (Patient states he has had a cough for a while), and COPD (Need nebulizer  evaluate for oxygen )         ASSESSMENT/PLAN:    ICD-10-CM    1. Primary hypertension  I10 Uncontrolled. May be related to history of chronic alcohol use. Patient wife will monitor blood pressure and call us ifBP >130/80 for prolonged period of time. 2. Centrilobular emphysema ([address])  J43.2 Patient with recent exacerbation which is slowly improving. Patient will continue Angela Spindle and would benefit from nebulizer with albuterol//Atrovent. .  Patient also will be evaluated for nocturnal oxygen and would benefit from 2 L of oxygen if qualifies      3. Weight loss  R63.4 Due to poor oral intake. This is exacerbated by his poor dentition. Patient will try to eat more frequent meals as much as he can tolerate. 4. Left foot pain  M79.672 Amb External Referral To Podiatry      5. Skin lesion  L98.9 Amb External Referral To Dermatology    right chest       6. Chronic hyponatremia  E87.1 Due to chronic alcohol use. Patient aware he is at risk of seizures if he does not cut back alcohol use      7. Uncomplicated alcohol dependence ([address])  F10.20 Patient not willing to stop alcohol use at this time      8. Colon cancer screening  Z12.11 Amb External Referral To Gastroenterology             Return in about 6 months (around 2024) for labs 1 week before. Subjective   SUBJECTIVE/OBJECTIVE:  Patient presents to the office today with multiple concerns. Patient has not been seen in person in the office in several years. Patient has a long history of alcohol dependence patient is a chronic hyponatremia.   Try to educate patient and his wife importance to stop alcohol use otherwise he can agree to worsening his hyponatremia and seizures and other
Garyelizabeth Curran presents today for   Chief Complaint   Patient presents with    Annual Exam    Toe Pain     Right foot     Cough     Patient states he has had a cough for a while                 1. \"Have you been to the ER, urgent care clinic since your last visit? Hospitalized since your last visit? \" Yes, [facility]    2. \"Have you seen or consulted any other health care providers outside of the [address] [place] [address] since your last visit? \" no     3. For patients aged 43-73: Has the patient had a colonoscopy / FIT/ Cologuard? Yes - no Care Gap present      If the patient is female:    4. For patients aged 43-66: Has the patient had a mammogram within the past 2 years? NA - based on age or sex      11. For patients aged 21-65: Has the patient had a pap smear?  NA - based on age or sex
68

## 2023-11-15 NOTE — TELEPHONE ENCOUNTER
----- Message from Gali Maurer MD sent at 11/14/2023  9:11 PM EST -----  Pt needs eval for oxygen and nebulizer  Dx COPD

## 2023-11-17 ENCOUNTER — TELEPHONE (OUTPATIENT)
Age: 68
End: 2023-11-17

## 2023-11-17 DIAGNOSIS — R05.1 ACUTE COUGH: ICD-10-CM

## 2023-11-17 RX ORDER — PREDNISONE 10 MG/1
TABLET ORAL
Qty: 1 EACH | Refills: 0 | Status: SHIPPED | OUTPATIENT
Start: 2023-11-17

## 2023-11-17 RX ORDER — GUAIFENESIN AND CODEINE PHOSPHATE 100; 10 MG/5ML; MG/5ML
10 SOLUTION ORAL EVERY 6 HOURS PRN
Qty: 120 ML | Refills: 0 | Status: SHIPPED | OUTPATIENT
Start: 2023-11-17 | End: 2023-11-24

## 2023-11-17 NOTE — TELEPHONE ENCOUNTER
Pt called states he has phlegm that he can't get up he said he coughs so hard he can't breath , he said lately he has been using albuterol but it doesn't seem to help he is asking if  send something to the pharmacy to help   [address]

## 2023-11-22 ENCOUNTER — TELEPHONE (OUTPATIENT)
Age: 68
End: 2023-11-22

## 2023-11-22 NOTE — TELEPHONE ENCOUNTER
[facility] dropped off an order that needs to be signed by Dr Nita Ledesma , I put it in DR jaime

## 2024-01-03 ENCOUNTER — TELEPHONE (OUTPATIENT)
Age: 69
End: 2024-01-03

## 2024-01-03 DIAGNOSIS — R05.1 ACUTE COUGH: Primary | ICD-10-CM

## 2024-01-03 RX ORDER — CODEINE PHOSPHATE/GUAIFENESIN 10-100MG/5
5 LIQUID (ML) ORAL 4 TIMES DAILY PRN
Qty: 120 ML | Refills: 0 | Status: SHIPPED | OUTPATIENT
Start: 2024-01-03 | End: 2024-01-10

## 2024-01-03 RX ORDER — PREDNISONE 10 MG/1
TABLET ORAL
Qty: 1 EACH | Refills: 0 | Status: SHIPPED | OUTPATIENT
Start: 2024-01-03

## 2024-01-03 RX ORDER — DOXYCYCLINE HYCLATE 100 MG/1
100 CAPSULE ORAL 2 TIMES DAILY
Qty: 20 CAPSULE | Refills: 0 | Status: SHIPPED | OUTPATIENT
Start: 2024-01-03 | End: 2024-01-13

## 2024-01-03 NOTE — TELEPHONE ENCOUNTER
Patient called and stated he has post nasal drip and a cough that results in coughing spells. It feels like he needs to cough up phlegm but nothing is coming up. Is unable to take a deep breath. Pt is requesting a medication to help his symptoms.   No appt available to meet needs of patient   Please advise.     Pharmacy [facility],

## 2024-01-05 ENCOUNTER — CLINICAL DOCUMENTATION (OUTPATIENT)
Age: 69
End: 2024-01-05

## 2024-01-05 NOTE — PROGRESS NOTES
1/5/2024 Called patient to schedule FREDY per dr wagner order, patient became very irate said that Dr. Singh can do the test on someone else, patient began to curse. Patient stated that I needed to call dr Silva's office and let them know the doctor is incompetent. I let patient know that I will call Dr. Silva's office and let them know that he refused to have the ultrasound done.     Spoke with Desiree from 's office and made her aware of the conversation I had with this patient. Desiree will make doctor and surgery scheduler there aware. VC

## 2024-01-11 ENCOUNTER — TELEPHONE (OUTPATIENT)
Age: 69
End: 2024-01-11

## 2024-01-12 ENCOUNTER — TELEPHONE (OUTPATIENT)
Age: 69
End: 2024-01-12

## 2024-01-12 DIAGNOSIS — I10 PRIMARY HYPERTENSION: Primary | ICD-10-CM

## 2024-01-22 RX ORDER — ALBUTEROL SULFATE 90 UG/1
AEROSOL, METERED RESPIRATORY (INHALATION)
Qty: 8.5 G | Refills: 3 | Status: SHIPPED | OUTPATIENT
Start: 2024-01-22

## 2024-02-09 ENCOUNTER — TELEPHONE (OUTPATIENT)
Age: 69
End: 2024-02-09

## 2024-02-09 NOTE — TELEPHONE ENCOUNTER
----- Message from Winter [place] sent at 2/9/2024  3:51 PM EST -----  Subject: Appointment Request    Reason for Call: Established Patient Appointment needed: Routine Pre-Op    QUESTIONS    Reason for appointment request? Available appointments did not meet   patient need     Additional Information for Provider? Pt has a procedure scheduled for   03/21/2024 and needs to get in for a pre op visit.  ---------------------------------------------------------------------------  --------------  CALL BACK INFO  [phone]; OK to leave message on voicemail  ---------------------------------------------------------------------------  --------------  SCRIPT ANSWERS

## 2024-02-26 RX ORDER — IBUPROFEN 800 MG/1
800 TABLET ORAL EVERY 8 HOURS PRN
Qty: 90 TABLET | Refills: 5 | Status: SHIPPED | OUTPATIENT
Start: 2024-02-26

## 2024-02-26 RX ORDER — FLUTICASONE PROPIONATE 50 MCG
SPRAY, SUSPENSION (ML) NASAL
Qty: 16 G | Refills: 5 | Status: SHIPPED | OUTPATIENT
Start: 2024-02-26

## 2024-02-26 RX ORDER — FLUTICASONE FUROATE, UMECLIDINIUM BROMIDE AND VILANTEROL TRIFENATATE 200; 62.5; 25 UG/1; UG/1; UG/1
POWDER RESPIRATORY (INHALATION)
Qty: 1 EACH | Refills: 5 | Status: SHIPPED | OUTPATIENT
Start: 2024-02-26

## 2024-03-11 ENCOUNTER — OFFICE VISIT (OUTPATIENT)
Age: 69
End: 2024-03-11
Payer: COMMERCIAL

## 2024-03-11 VITALS
TEMPERATURE: 98.3 F | HEIGHT: 72 IN | WEIGHT: 110 LBS | BODY MASS INDEX: 14.9 KG/M2 | SYSTOLIC BLOOD PRESSURE: 133 MMHG | DIASTOLIC BLOOD PRESSURE: 87 MMHG | OXYGEN SATURATION: 98 % | HEART RATE: 87 BPM

## 2024-03-11 DIAGNOSIS — F10.20 UNCOMPLICATED ALCOHOL DEPENDENCE (HCC): ICD-10-CM

## 2024-03-11 DIAGNOSIS — Z12.11 COLON CANCER SCREENING: ICD-10-CM

## 2024-03-11 DIAGNOSIS — J43.2 CENTRILOBULAR EMPHYSEMA (HCC): ICD-10-CM

## 2024-03-11 DIAGNOSIS — I10 PRIMARY HYPERTENSION: Primary | ICD-10-CM

## 2024-03-11 DIAGNOSIS — M54.42 CHRONIC MIDLINE LOW BACK PAIN WITH LEFT-SIDED SCIATICA: ICD-10-CM

## 2024-03-11 DIAGNOSIS — H25.013 CORTICAL AGE-RELATED CATARACT OF BOTH EYES: ICD-10-CM

## 2024-03-11 DIAGNOSIS — G89.29 CHRONIC MIDLINE LOW BACK PAIN WITH LEFT-SIDED SCIATICA: ICD-10-CM

## 2024-03-11 PROCEDURE — 1123F ACP DISCUSS/DSCN MKR DOCD: CPT | Performed by: INTERNAL MEDICINE

## 2024-03-11 PROCEDURE — 3075F SYST BP GE 130 - 139MM HG: CPT | Performed by: INTERNAL MEDICINE

## 2024-03-11 PROCEDURE — 3079F DIAST BP 80-89 MM HG: CPT | Performed by: INTERNAL MEDICINE

## 2024-03-11 PROCEDURE — 99214 OFFICE O/P EST MOD 30 MIN: CPT | Performed by: INTERNAL MEDICINE

## 2024-03-11 ASSESSMENT — PATIENT HEALTH QUESTIONNAIRE - PHQ9
SUM OF ALL RESPONSES TO PHQ QUESTIONS 1-9: 0
2. FEELING DOWN, DEPRESSED OR HOPELESS: 0
1. LITTLE INTEREST OR PLEASURE IN DOING THINGS: 0
SUM OF ALL RESPONSES TO PHQ QUESTIONS 1-9: 0
SUM OF ALL RESPONSES TO PHQ9 QUESTIONS 1 & 2: 0
SUM OF ALL RESPONSES TO PHQ QUESTIONS 1-9: 0
SUM OF ALL RESPONSES TO PHQ QUESTIONS 1-9: 0

## 2024-03-11 NOTE — PROGRESS NOTES
Preoperative Evaluation    Date of Exam: 3/11/2024    Dallin Anne is a 68 y.o. male (:1955) who presents for preoperative evaluation.   Procedure/Surgery: Cataract surgery  Date of Procedure/Surgery: 3/21/24 & 24  Surgeon: Dr Riana Ordoñez  [facility]/Surgical Facility: Surgery [facility]    Primary Physician: Tex Wu MD    HPI: Patient presents for medical clearance for cataract surgery.  Patient's blood pressure has been elevated in the past probably exacerbated by chronic alcohol use.  He has monitored it at home and it is usually less than 130/80 most of the time so he has not been placed on blood pressure medicines.  He will try monitoring for few days before the surgery to confirm that he does not need any blood pressure medicines.  He has been encouraged to cut back alcohol use.  Patient also has a history of being underweight and this in conjunction with his tobacco dependency has led to multiple compression fractures and degenerative joint disease of lumbar spine.  He is currently controlling pain with ibuprofen.  He has COPD which is stable on Trelegy Ellipta.  Overall patient is at low risk for this low risk surgery and is medically cleared to proceed    Medical History:     Past Medical History:   Diagnosis Date    Ankle fracture, left 10/19/2008    Cough     DJD (degenerative joint disease) of knee 11/10/2010    ED (erectile dysfunction) 11/10/2010    Fracture of left tibia 10/19/2008    Severe Grade IIIB    GERD (gastroesophageal reflux disease) 11/10/2010    denies    Hearing loss     Left ankle pain     Left ankle sprain     Left ankle strain     Pain with urination     Pneumonia     Sinusitis 11/10/2010    Wears glasses      Allergies:     Allergies   Allergen Reactions    Niacin Rash      Medications:     Current Outpatient Medications   Medication Sig    ibuprofen (ADVIL;MOTRIN) 800 MG tablet take 1 tablet by mouth every 8 hours AS NEEDED FOR PAIN

## 2024-03-11 NOTE — PROGRESS NOTES
Dallin Anne presents today for   Chief Complaint   Patient presents with    Pre-op Exam                 1. \"Have you been to the ER, urgent care clinic since your last visit?  Hospitalized since your last visit?\" no    2. \"Have you seen or consulted any other health care providers outside of the [facility] since your last visit?\" no     3. For patients aged 45-75: Has the patient had a colonoscopy / FIT/ Cologuard? No      If the patient is female:    4. For patients aged 40-74: Has the patient had a mammogram within the past 2 years? NA - based on age or sex      5. For patients aged 21-65: Has the patient had a pap smear? NA - based on age or sex

## 2024-03-14 ENCOUNTER — TELEPHONE (OUTPATIENT)
Age: 69
End: 2024-03-14

## 2024-03-15 ENCOUNTER — TELEPHONE (OUTPATIENT)
Age: 69
End: 2024-03-15

## 2024-03-15 NOTE — TELEPHONE ENCOUNTER
Pt asked if Dr Wu could write  a script for a back brace    He also gave his BP readings I put list in Dr Lopez up front

## 2024-04-08 ENCOUNTER — TELEPHONE (OUTPATIENT)
Age: 69
End: 2024-04-08

## 2024-04-08 DIAGNOSIS — M79.672 LEFT FOOT PAIN: Primary | ICD-10-CM

## 2024-04-08 NOTE — TELEPHONE ENCOUNTER
Pt called in states wants to change the podiatry referral to Dr. Dallin iKrk   [address]  [facility] [address]    Phone# [phone]  Fax# [phone]

## 2024-04-10 RX ORDER — ALBUTEROL SULFATE 90 UG/1
AEROSOL, METERED RESPIRATORY (INHALATION)
Qty: 8.5 G | Refills: 3 | Status: SHIPPED | OUTPATIENT
Start: 2024-04-10

## 2024-04-23 ENCOUNTER — TELEPHONE (OUTPATIENT)
Age: 69
End: 2024-04-23

## 2024-04-23 ENCOUNTER — OFFICE VISIT (OUTPATIENT)
Age: 69
End: 2024-04-23

## 2024-04-23 VITALS — HEIGHT: 72 IN | BODY MASS INDEX: 14.92 KG/M2

## 2024-04-23 DIAGNOSIS — L84 CALLUS OF TOE: Primary | ICD-10-CM

## 2024-04-23 DIAGNOSIS — M25.572 PAIN IN LEFT ANKLE AND JOINTS OF LEFT FOOT: ICD-10-CM

## 2024-04-23 DIAGNOSIS — I10 PRIMARY HYPERTENSION: Primary | ICD-10-CM

## 2024-04-23 DIAGNOSIS — I73.9 PAD (PERIPHERAL ARTERY DISEASE) (HCC): ICD-10-CM

## 2024-04-23 NOTE — TELEPHONE ENCOUNTER
BP readings from home run 140-130's/80's. Pt will continue to monitor. Pt has been educated that chronic ETOH use can elevate his BP.

## 2024-04-23 NOTE — PROGRESS NOTES
Health     Financial Resource Strain: Patient Declined (11/14/2023)    Overall Financial Resource Strain (CARDIA)     Difficulty of Paying Living Expenses: Patient declined   Transportation Needs: Unknown (11/14/2023)    PRAPARE - Transportation     Lack of Transportation (Non-Medical): Patient declined   Housing Stability: Unknown (11/14/2023)    Housing Stability Vital Sign     Unstable Housing in the Last Year: Patient refused       CURRENT MEDICATIONS:  A list of medications prior to the time of admission include:    Current Outpatient Medications   Medication Sig    albuterol sulfate HFA (PROVENTIL;VENTOLIN;PROAIR) 108 (90 Base) MCG/ACT inhaler inhale 2 puffs by mouth and INTO THE LUNGS every 4 hours if needed for wheezing    ibuprofen (ADVIL;MOTRIN) 800 MG tablet take 1 tablet by mouth every 8 hours AS NEEDED FOR PAIN    fluticasone-umeclidin-vilant (TRELEGY ELLIPTA) 200-62.5-25 MCG/ACT AEPB inhaler inhale 1 puff  BY MOUTH daily    fluticasone (FLONASE) 50 MCG/ACT nasal spray instill 2 sprays into each nostril once daily    ipratropium 0.5 mg-albuterol 2.5 mg (DUONEB) 0.5-2.5 (3) MG/3ML SOLN nebulizer solution Inhale 3 mLs into the lungs every 4 hours     No current facility-administered medications for this visit.        DIAGNOSTIC LAB DATA      XR CHEST PORTABLE 10/27/2023    Narrative  PORTABLE CHEST X-RAY    CPT CODE: 16557    INDICATION: Shortness of breath.    COMPARISON: Prior exam 3/28/2023.    FINDINGS:  Heart size normal. Aortic arch not enlarged.  Large hiatal hernia redemonstrated similar scarring at the right apex with area  of cavitation or adjacent bleb similar to the prior exam. Mild volume loss.  No overt pulmonary edema. No focal consolidation. No significant effusion.  Questionable minimal subsegmental patchy nodular densities at the dependent  lateral right lung base. Could not exclude some mild infiltrates at this level.    Impression  1.  Possible subsegmental small nodular airspace

## 2024-04-29 ENCOUNTER — TELEPHONE (OUTPATIENT)
Age: 69
End: 2024-04-29

## 2024-04-29 RX ORDER — BENZONATATE 200 MG/1
200 CAPSULE ORAL 3 TIMES DAILY PRN
Qty: 21 CAPSULE | Refills: 0 | Status: SHIPPED | OUTPATIENT
Start: 2024-04-29 | End: 2024-05-06

## 2024-04-29 RX ORDER — DOXYCYCLINE HYCLATE 100 MG/1
100 CAPSULE ORAL 2 TIMES DAILY
Qty: 20 CAPSULE | Refills: 0 | Status: SHIPPED | OUTPATIENT
Start: 2024-04-29 | End: 2024-05-09

## 2024-04-29 NOTE — TELEPHONE ENCOUNTER
Pt called stating that he's congested and not able to rest pt request a antibiotic and cough medication       [id number] - [place], [place] - [address] [place] [address] - P [phone] - F [phone] [[id number]]

## 2024-05-13 ENCOUNTER — TELEPHONE (OUTPATIENT)
Age: 69
End: 2024-05-13

## 2024-05-13 NOTE — TELEPHONE ENCOUNTER
Pt called in about his appointment tomorrow and states he doesn't know why he needs to be seen. He says everything is going along normal with him.     Notified pt that his appointment was a 6 month follow up for chronic conditions and to monitor medication use.     Pt asked if the doctor has to see him or can he just call when there is an issue.   I mentioned to the patient that if wanted to cancel the appointment that is okay. Pt requested to check with clinical staff/PCP if that is okay?  If provider needs to see pt he would prefer to make it Virtual.     Please advise.

## 2024-05-17 ENCOUNTER — TELEPHONE (OUTPATIENT)
Age: 69
End: 2024-05-17

## 2024-05-17 NOTE — TELEPHONE ENCOUNTER
Pt called in states still has a very bad cough after taking benzonatate (TESSALON) 200 MG capsule.   Pt states there is no relief and wants to know if something else can be prescribed.       Please advise.     Pharmacy   [id number] - [place], [place] - [address] [place] [address] -  [phone] - F [phone] [[id number]]

## 2024-05-27 RX ORDER — IPRATROPIUM BROMIDE AND ALBUTEROL SULFATE 2.5; .5 MG/3ML; MG/3ML
SOLUTION RESPIRATORY (INHALATION)
Qty: 360 ML | Refills: 2 | Status: SHIPPED | OUTPATIENT
Start: 2024-05-27

## 2024-05-30 ENCOUNTER — TELEPHONE (OUTPATIENT)
Age: 69
End: 2024-05-30

## 2024-05-30 RX ORDER — CYCLOBENZAPRINE HCL 10 MG
10 TABLET ORAL 3 TIMES DAILY PRN
Qty: 60 TABLET | Refills: 1 | Status: SHIPPED | OUTPATIENT
Start: 2024-05-30

## 2024-05-30 NOTE — TELEPHONE ENCOUNTER
Refill request via phone     cyclobenzaprine (FLEXERIL) 10 MG tablet      [facility] [address]

## 2024-06-26 ENCOUNTER — TELEPHONE (OUTPATIENT)
Facility: CLINIC | Age: 69
End: 2024-06-26

## 2024-08-05 ENCOUNTER — TELEPHONE (OUTPATIENT)
Facility: CLINIC | Age: 69
End: 2024-08-05

## 2024-08-05 NOTE — TELEPHONE ENCOUNTER
Refill request via phone     fluticasone-umeclidin-vilant (TRELEGY ELLIPTA) 200-62.5-25 MCG/ACT AEPB inhaler     Pt also stated that his cough that he has had in the past is back and he is having difficulty clearing the phlegm from his chest. When he does cough up phlegm it does have blood in it. In the past he has received a cough medication that helped, pt also requesting that.     guaiFENesin-codeine (TUSSI-ORGANIDIN NR) 100-10 MG/5ML syrup     Preferred Pharmacy: [facility]

## 2024-08-05 NOTE — TELEPHONE ENCOUNTER
Last OV: 3/11/2024  No future appointment  Last refill: 2/26/2024     reviewed 8/5/2024  Last refill: 1/4/2024 for Guaifenesin-codeine qty 120 mL for a 7 d/s

## 2024-08-06 RX ORDER — FLUTICASONE FUROATE, UMECLIDINIUM BROMIDE AND VILANTEROL TRIFENATATE 200; 62.5; 25 UG/1; UG/1; UG/1
POWDER RESPIRATORY (INHALATION)
Qty: 1 EACH | Refills: 5 | Status: SHIPPED | OUTPATIENT
Start: 2024-08-06

## 2024-08-06 NOTE — TELEPHONE ENCOUNTER
Left detailed message for patient Trelegy refilled.  Patient will have to go to the urgent care to have cough evaluated.  Any questions call the office.

## 2024-10-09 ENCOUNTER — TELEPHONE (OUTPATIENT)
Age: 69
End: 2024-10-09

## 2024-10-09 NOTE — TELEPHONE ENCOUNTER
Patient is requesting a call back regarding the blood flow test that Dr. Kirk wanted the patient to complete. Patient wants to know where to go to get this test completed.    Patient tel [phone]

## 2024-10-18 NOTE — TELEPHONE ENCOUNTER
Patient called and is requesting a call back about previous message.          Please call and advise patient at  [phone]

## 2024-10-28 RX ORDER — IBUPROFEN 800 MG/1
800 TABLET, FILM COATED ORAL EVERY 8 HOURS PRN
Qty: 90 TABLET | Refills: 5 | Status: SHIPPED | OUTPATIENT
Start: 2024-10-28

## 2024-10-28 RX ORDER — FLUTICASONE PROPIONATE 50 MCG
SPRAY, SUSPENSION (ML) NASAL
Qty: 16 G | Refills: 5 | Status: SHIPPED | OUTPATIENT
Start: 2024-10-28

## 2024-11-26 ENCOUNTER — TELEPHONE (OUTPATIENT)
Facility: CLINIC | Age: 69
End: 2024-11-26

## 2024-11-26 RX ORDER — LIDOCAINE 50 MG/G
1 PATCH TOPICAL DAILY
Qty: 30 PATCH | Refills: 5 | Status: SHIPPED | OUTPATIENT
Start: 2024-11-26 | End: 2025-05-25

## 2024-11-26 NOTE — TELEPHONE ENCOUNTER
The patient called for a prescription for lidocaine patches.the pharmacy in chart is up to date.   
med approved and sent electronically  
PAST SURGICAL HISTORY:  No significant past surgical history

## 2024-11-27 ENCOUNTER — TELEPHONE (OUTPATIENT)
Facility: CLINIC | Age: 69
End: 2024-11-27

## 2024-11-27 RX ORDER — CYCLOBENZAPRINE HCL 10 MG
10 TABLET ORAL 3 TIMES DAILY PRN
Qty: 60 TABLET | Refills: 1 | Status: SHIPPED | OUTPATIENT
Start: 2024-11-27

## 2024-11-27 NOTE — TELEPHONE ENCOUNTER
Refill request via phone      Medication: cyclobenzaprine (FLEXERIL) 10 MG tablet   Quantity: 60  Pharmacy: [id number] [address]   Last Fill: 5/30/2024    PCP: Tex Wu MD    LAST OFFICE VISIT: 3/11/2024      No future appointments.

## 2024-12-10 ENCOUNTER — TELEPHONE (OUTPATIENT)
Facility: CLINIC | Age: 69
End: 2024-12-10

## 2024-12-10 RX ORDER — BENZONATATE 200 MG/1
200 CAPSULE ORAL 3 TIMES DAILY PRN
Qty: 21 CAPSULE | Refills: 0 | Status: SHIPPED | OUTPATIENT
Start: 2024-12-10 | End: 2024-12-17

## 2024-12-10 RX ORDER — DOXYCYCLINE HYCLATE 100 MG
100 TABLET ORAL 2 TIMES DAILY
Qty: 14 TABLET | Refills: 0 | Status: SHIPPED | OUTPATIENT
Start: 2024-12-10 | End: 2024-12-17

## 2024-12-10 NOTE — TELEPHONE ENCOUNTER
Patient contacted the office, made him aware of Dr Hope orders and patient expressed understanding.

## 2024-12-10 NOTE — TELEPHONE ENCOUNTER
Pt called in states He has a cough and sinus issues that started about a week and a half ago. He states when he coughs it feel like the phlegm is packed down and its hard for it to come up. When it does the coloring is mixed wth blood off and on. He also stated the coughing is causing him to vomit.     Please advise. Pt wants to know if something can be called in.     Pharmacy   [id number] - [address] [phone] - F [phone] [[id number]]

## 2024-12-11 NOTE — PERIOP NOTE
Health update and pending discharge from PACU to phase 2 recovery for discharge home @ 15:30pm given to patient's daughter  Makenzie. At that 15:30 patient's bedrest will be completed. Patient will also receive meal tray.
[place] sandwich left at bedside. Patient tolerating orange juice given to patient.
Detail Level: Zone

## 2024-12-16 ENCOUNTER — TRANSCRIBE ORDERS (OUTPATIENT)
Facility: HOSPITAL | Age: 69
End: 2024-12-16

## 2024-12-16 DIAGNOSIS — I70.213 ATHEROSCLEROSIS OF NATIVE ARTERY OF BOTH LOWER EXTREMITIES WITH INTERMITTENT CLAUDICATION (HCC): Primary | ICD-10-CM

## 2024-12-23 ENCOUNTER — HOSPITAL ENCOUNTER (OUTPATIENT)
Facility: HOSPITAL | Age: 69
Discharge: HOME | End: 2024-12-26
Attending: SURGERY
Payer: COMMERCIAL

## 2024-12-23 DIAGNOSIS — I70.213 ATHEROSCLEROSIS OF NATIVE ARTERY OF BOTH LOWER EXTREMITIES WITH INTERMITTENT CLAUDICATION (HCC): ICD-10-CM

## 2024-12-23 LAB — CREAT UR-MCNC: 0.6 MG/DL (ref 0.6–1.3)

## 2024-12-23 PROCEDURE — 82565 ASSAY OF CREATININE: CPT

## 2024-12-23 PROCEDURE — 75635 CT ANGIO ABDOMINAL ARTERIES: CPT

## 2024-12-23 PROCEDURE — 6360000004 HC RX CONTRAST MEDICATION: Performed by: SURGERY

## 2024-12-23 RX ORDER — IOPAMIDOL 755 MG/ML
100 INJECTION, SOLUTION INTRAVASCULAR
Status: COMPLETED | OUTPATIENT
Start: 2024-12-23 | End: 2024-12-23

## 2024-12-23 RX ADMIN — IOPAMIDOL 100 ML: 755 INJECTION, SOLUTION INTRAVENOUS at 12:24

## 2025-01-03 RX ORDER — CYCLOBENZAPRINE HCL 10 MG
10 TABLET ORAL 3 TIMES DAILY PRN
Qty: 60 TABLET | Refills: 1 | Status: SHIPPED | OUTPATIENT
Start: 2025-01-03

## 2025-01-25 ENCOUNTER — HOSPITAL ENCOUNTER (OUTPATIENT)
Facility: HOSPITAL | Age: 70
Discharge: HOME | End: 2025-01-28
Payer: COMMERCIAL

## 2025-01-25 DIAGNOSIS — Z01.811 PRE-OP CHEST EXAM: ICD-10-CM

## 2025-01-25 DIAGNOSIS — Z01.818 PRE-OP TESTING: ICD-10-CM

## 2025-01-25 LAB
ANION GAP SERPL CALC-SCNC: 5 MMOL/L (ref 3–18)
BASOPHILS # BLD: 0.07 K/UL (ref 0–0.1)
BASOPHILS NFR BLD: 0.9 % (ref 0–2)
BUN SERPL-MCNC: 6 MG/DL (ref 7–18)
BUN/CREAT SERPL: 9 (ref 12–20)
CALCIUM SERPL-MCNC: 9.1 MG/DL (ref 8.5–10.1)
CHLORIDE SERPL-SCNC: 91 MMOL/L (ref 100–111)
CO2 SERPL-SCNC: 27 MMOL/L (ref 21–32)
CREAT SERPL-MCNC: 0.64 MG/DL (ref 0.6–1.3)
DIFFERENTIAL METHOD BLD: ABNORMAL
EOSINOPHIL # BLD: 0.16 K/UL (ref 0–0.4)
EOSINOPHIL NFR BLD: 2.1 % (ref 0–5)
ERYTHROCYTE [DISTWIDTH] IN BLOOD BY AUTOMATED COUNT: 13.5 % (ref 11.6–14.5)
GLUCOSE SERPL-MCNC: 103 MG/DL (ref 74–99)
HCT VFR BLD AUTO: 44.9 % (ref 36–48)
HGB BLD-MCNC: 15.2 G/DL (ref 13–16)
IMM GRANULOCYTES # BLD AUTO: 0.03 K/UL (ref 0–0.04)
IMM GRANULOCYTES NFR BLD AUTO: 0.4 % (ref 0–0.5)
LYMPHOCYTES # BLD: 1.51 K/UL (ref 0.9–3.6)
LYMPHOCYTES NFR BLD: 19.6 % (ref 21–52)
MCH RBC QN AUTO: 32.5 PG (ref 24–34)
MCHC RBC AUTO-ENTMCNC: 33.9 G/DL (ref 31–37)
MCV RBC AUTO: 96.1 FL (ref 78–100)
MONOCYTES # BLD: 0.92 K/UL (ref 0.05–1.2)
MONOCYTES NFR BLD: 11.9 % (ref 3–10)
NEUTS SEG # BLD: 5.03 K/UL (ref 1.8–8)
NEUTS SEG NFR BLD: 65.1 % (ref 40–73)
NRBC # BLD: 0 K/UL (ref 0–0.01)
NRBC BLD-RTO: 0 PER 100 WBC
PLATELET # BLD AUTO: 289 K/UL (ref 135–420)
PMV BLD AUTO: 10.3 FL (ref 9.2–11.8)
POTASSIUM SERPL-SCNC: 5.1 MMOL/L (ref 3.5–5.5)
RBC # BLD AUTO: 4.67 M/UL (ref 4.35–5.65)
SODIUM SERPL-SCNC: 123 MMOL/L (ref 136–145)
WBC # BLD AUTO: 7.7 K/UL (ref 4.6–13.2)

## 2025-01-25 PROCEDURE — 36415 COLL VENOUS BLD VENIPUNCTURE: CPT

## 2025-01-25 PROCEDURE — 85025 COMPLETE CBC W/AUTO DIFF WBC: CPT

## 2025-01-25 PROCEDURE — 80048 BASIC METABOLIC PNL TOTAL CA: CPT

## 2025-01-25 PROCEDURE — 93005 ELECTROCARDIOGRAM TRACING: CPT

## 2025-01-25 PROCEDURE — 71046 X-RAY EXAM CHEST 2 VIEWS: CPT

## 2025-01-26 LAB
EKG ATRIAL RATE: 82 BPM
EKG DIAGNOSIS: NORMAL
EKG P AXIS: 52 DEGREES
EKG P-R INTERVAL: 162 MS
EKG Q-T INTERVAL: 356 MS
EKG QRS DURATION: 92 MS
EKG QTC CALCULATION (BAZETT): 415 MS
EKG R AXIS: -15 DEGREES
EKG T AXIS: 54 DEGREES
EKG VENTRICULAR RATE: 82 BPM

## 2025-02-11 ENCOUNTER — TELEPHONE (OUTPATIENT)
Facility: CLINIC | Age: 70
End: 2025-02-11

## 2025-02-11 RX ORDER — FLUTICASONE FUROATE, UMECLIDINIUM BROMIDE AND VILANTEROL TRIFENATATE 200; 62.5; 25 UG/1; UG/1; UG/1
POWDER RESPIRATORY (INHALATION)
Qty: 1 EACH | Refills: 5 | Status: SHIPPED | OUTPATIENT
Start: 2025-02-11

## 2025-02-11 NOTE — TELEPHONE ENCOUNTER
Pt called requesting a cheaper brand of fluticasone-umeclidin-vilant (TRELEGY ELLIPTA) 200-62.5-25 MCG/ACT AEPB inhaler        [id number] - [place], [place] - [address] [place] [address] - P [phone] - F [phone] [[id number]]       Please advise

## 2025-03-07 DIAGNOSIS — I70.201 FEMORAL ARTERY OCCLUSION, RIGHT: Primary | ICD-10-CM

## 2025-03-07 RX ORDER — HYDROCODONE BITARTRATE AND ACETAMINOPHEN 5; 325 MG/1; MG/1
1 TABLET ORAL EVERY 4 HOURS PRN
Qty: 42 TABLET | Refills: 0 | Status: SHIPPED | OUTPATIENT
Start: 2025-03-07 | End: 2025-03-14

## 2025-03-12 ENCOUNTER — TELEPHONE (OUTPATIENT)
Facility: CLINIC | Age: 70
End: 2025-03-12

## 2025-03-12 DIAGNOSIS — E87.1 CHRONIC HYPONATREMIA: ICD-10-CM

## 2025-03-12 DIAGNOSIS — F10.20 UNCOMPLICATED ALCOHOL DEPENDENCE (HCC): ICD-10-CM

## 2025-03-12 DIAGNOSIS — I70.201 FEMORAL ARTERY OCCLUSION, RIGHT: Primary | ICD-10-CM

## 2025-03-12 DIAGNOSIS — E55.9 VITAMIN D DEFICIENCY: ICD-10-CM

## 2025-03-12 DIAGNOSIS — I10 PRIMARY HYPERTENSION: Primary | ICD-10-CM

## 2025-03-12 DIAGNOSIS — R63.4 WEIGHT LOSS: ICD-10-CM

## 2025-03-12 DIAGNOSIS — Z12.5 PROSTATE CANCER SCREENING: ICD-10-CM

## 2025-03-12 DIAGNOSIS — E29.1 HYPOGONADISM IN MALE: ICD-10-CM

## 2025-03-12 RX ORDER — HYDROCODONE BITARTRATE AND ACETAMINOPHEN 5; 325 MG/1; MG/1
1 TABLET ORAL EVERY 4 HOURS PRN
Qty: 42 TABLET | Refills: 0 | Status: SHIPPED | OUTPATIENT
Start: 2025-03-12 | End: 2025-03-19

## 2025-03-12 NOTE — TELEPHONE ENCOUNTER
Pt has not been seen in over a year. He would need OV and can schedule for him to have labs 1 week before or day of visit if travel is difficult

## 2025-03-12 NOTE — TELEPHONE ENCOUNTER
The patient is requesting an order to check his testosterone levels. He says he's been losing weight and muscle mass.  Please advise.

## 2025-03-13 NOTE — PERIOP NOTE
Instructions for your surgery at [facility]      Today's Date:  3/13/2025      Patient's Name:  Dallin Anne           Surgery Date:  3/17/25              Please enter the main entrance of the hospital and check-in at the front security desk located in the lobby. They will direct you to the area to report for your surgery.     Do NOT eat or drink anything, including candy, gum, or ice chips after midnight prior to your surgery, unless you have specific instructions from your surgeon or anesthesia provider to do so.  Brush your teeth before coming to the hospital. You may swish with water, but do not swallow.  No smoking/Vaping/E-Cigarettes 24 hours prior to the day of surgery.  No alcohol 24 hours prior to the day of surgery.  No recreational drugs for one week prior to the day of surgery.  Bring Photo ID, Insurance information, and Co-pay if required on day of surgery.  Bring in pertinent legal documents, such as, Medical Power of , DNR, Advance Directive, etc.  Leave all valuables, including money/purse, weapons at home.  Remove all jewelry, including ALL body piercings, nail polish, acrylic nails, and makeup (including mascara); no lotions, powders, deodorant, or perfume/cologne/after shave on the skin.  Follow instruction for Hibiclens washes and CHG wipes from surgeon's office.   Glasses and dentures may be worn to the hospital. They must be removed prior to surgery. Please bring case/container for glasses or dentures.   Contact lenses should not be worn on day of surgery.   Call your doctor's office if symptoms of a cold or illness develop within 24-48 hours prior to your surgery.  Call your doctor's office if you have any questions concerning insurance or co-pays.  15. AN ADULT (relative or friend 18 years or older) MUST DRIVE YOU HOME AFTER YOUR SURGERY.  16. Please make arrangements for a responsible adult (18 years or older) to be with you for 24 hours after

## 2025-03-13 NOTE — TELEPHONE ENCOUNTER
Called pt and he said that he wouldn't be able to come in to see pcp for at least a month or so. Pt said he has a lot of health issues going on right now and has been in a lot of pain and has been doing some research on what he thinks could be the issue and thought that he could be seen. Pt will call if anything changes.

## 2025-03-14 ENCOUNTER — TELEPHONE (OUTPATIENT)
Facility: CLINIC | Age: 70
End: 2025-03-14

## 2025-03-14 ENCOUNTER — HOSPITAL ENCOUNTER (EMERGENCY)
Facility: HOSPITAL | Age: 70
Discharge: HOME | End: 2025-03-14
Payer: COMMERCIAL

## 2025-03-14 VITALS
BODY MASS INDEX: 14.9 KG/M2 | SYSTOLIC BLOOD PRESSURE: 177 MMHG | HEIGHT: 72 IN | DIASTOLIC BLOOD PRESSURE: 94 MMHG | TEMPERATURE: 98.3 F | OXYGEN SATURATION: 98 % | WEIGHT: 110 LBS | RESPIRATION RATE: 16 BRPM | HEART RATE: 97 BPM

## 2025-03-14 DIAGNOSIS — M79.604 PAIN OF RIGHT LOWER EXTREMITY: ICD-10-CM

## 2025-03-14 DIAGNOSIS — F10.10 ALCOHOL ABUSE: ICD-10-CM

## 2025-03-14 DIAGNOSIS — E87.1 HYPONATREMIA: Primary | ICD-10-CM

## 2025-03-14 DIAGNOSIS — I73.9 PAD (PERIPHERAL ARTERY DISEASE): ICD-10-CM

## 2025-03-14 LAB
ALBUMIN SERPL-MCNC: 3.1 G/DL (ref 3.4–5)
ALBUMIN/GLOB SERPL: 0.8 (ref 0.8–1.7)
ALP SERPL-CCNC: 70 U/L (ref 45–117)
ALT SERPL-CCNC: 19 U/L (ref 16–61)
ANION GAP SERPL CALC-SCNC: 8 MMOL/L (ref 3–18)
AST SERPL-CCNC: 22 U/L (ref 10–38)
BASOPHILS # BLD: 0.07 K/UL (ref 0–0.1)
BASOPHILS NFR BLD: 0.6 % (ref 0–2)
BILIRUB SERPL-MCNC: 0.4 MG/DL (ref 0.2–1)
BUN SERPL-MCNC: 5 MG/DL (ref 7–18)
BUN/CREAT SERPL: 11 (ref 12–20)
CA-I BLD-SCNC: 1.19 MMOL/L (ref 1.12–1.32)
CALCIUM SERPL-MCNC: 9 MG/DL (ref 8.5–10.1)
CHLORIDE SERPL-SCNC: 92 MMOL/L (ref 100–111)
CO2 SERPL-SCNC: 28 MMOL/L (ref 21–32)
CREAT SERPL-MCNC: 0.44 MG/DL (ref 0.6–1.3)
DIFFERENTIAL METHOD BLD: ABNORMAL
EOSINOPHIL # BLD: 0.46 K/UL (ref 0–0.4)
EOSINOPHIL NFR BLD: 4.2 % (ref 0–5)
ERYTHROCYTE [DISTWIDTH] IN BLOOD BY AUTOMATED COUNT: 13 % (ref 11.6–14.5)
GLOBULIN SER CALC-MCNC: 3.9 G/DL (ref 2–4)
GLUCOSE SERPL-MCNC: 102 MG/DL (ref 74–99)
HCT VFR BLD AUTO: 39.9 % (ref 36–48)
HGB BLD-MCNC: 13.6 G/DL (ref 13–16)
IMM GRANULOCYTES # BLD AUTO: 0.03 K/UL (ref 0–0.04)
IMM GRANULOCYTES NFR BLD AUTO: 0.3 % (ref 0–0.5)
LYMPHOCYTES # BLD: 1.59 K/UL (ref 0.9–3.6)
LYMPHOCYTES NFR BLD: 14.7 % (ref 21–52)
MCH RBC QN AUTO: 32.5 PG (ref 24–34)
MCHC RBC AUTO-ENTMCNC: 34.1 G/DL (ref 31–37)
MCV RBC AUTO: 95.2 FL (ref 78–100)
MONOCYTES # BLD: 1.15 K/UL (ref 0.05–1.2)
MONOCYTES NFR BLD: 10.6 % (ref 3–10)
NEUTS SEG # BLD: 7.53 K/UL (ref 1.8–8)
NEUTS SEG NFR BLD: 69.6 % (ref 40–73)
NRBC # BLD: 0 K/UL (ref 0–0.01)
NRBC BLD-RTO: 0 PER 100 WBC
PLATELET # BLD AUTO: 381 K/UL (ref 135–420)
PMV BLD AUTO: 9.3 FL (ref 9.2–11.8)
POTASSIUM SERPL-SCNC: 3.8 MMOL/L (ref 3.5–5.5)
PROT SERPL-MCNC: 7 G/DL (ref 6.4–8.2)
RBC # BLD AUTO: 4.19 M/UL (ref 4.35–5.65)
SODIUM SERPL-SCNC: 128 MMOL/L (ref 136–145)
WBC # BLD AUTO: 10.8 K/UL (ref 4.6–13.2)

## 2025-03-14 PROCEDURE — 2580000003 HC RX 258: Performed by: EMERGENCY MEDICINE

## 2025-03-14 PROCEDURE — 96361 HYDRATE IV INFUSION ADD-ON: CPT

## 2025-03-14 PROCEDURE — 6360000002 HC RX W HCPCS: Performed by: EMERGENCY MEDICINE

## 2025-03-14 PROCEDURE — 99284 EMERGENCY DEPT VISIT MOD MDM: CPT

## 2025-03-14 PROCEDURE — 82330 ASSAY OF CALCIUM: CPT

## 2025-03-14 PROCEDURE — 80053 COMPREHEN METABOLIC PANEL: CPT

## 2025-03-14 PROCEDURE — 94761 N-INVAS EAR/PLS OXIMETRY MLT: CPT

## 2025-03-14 PROCEDURE — 85025 COMPLETE CBC W/AUTO DIFF WBC: CPT

## 2025-03-14 PROCEDURE — 96374 THER/PROPH/DIAG INJ IV PUSH: CPT

## 2025-03-14 RX ORDER — 0.9 % SODIUM CHLORIDE 0.9 %
2000 INTRAVENOUS SOLUTION INTRAVENOUS ONCE
Status: COMPLETED | OUTPATIENT
Start: 2025-03-14 | End: 2025-03-14

## 2025-03-14 RX ORDER — HYDROMORPHONE HYDROCHLORIDE 2 MG/1
2 TABLET ORAL EVERY 6 HOURS PRN
Qty: 12 TABLET | Refills: 0 | Status: ON HOLD | OUTPATIENT
Start: 2025-03-14 | End: 2025-03-23 | Stop reason: HOSPADM

## 2025-03-14 RX ORDER — MORPHINE SULFATE 4 MG/ML
4 INJECTION, SOLUTION INTRAMUSCULAR; INTRAVENOUS
Status: COMPLETED | OUTPATIENT
Start: 2025-03-14 | End: 2025-03-14

## 2025-03-14 RX ADMIN — SODIUM CHLORIDE 2000 ML: 0.9 INJECTION, SOLUTION INTRAVENOUS at 14:33

## 2025-03-14 RX ADMIN — MORPHINE SULFATE 4 MG: 4 INJECTION, SOLUTION INTRAMUSCULAR; INTRAVENOUS at 14:39

## 2025-03-14 ASSESSMENT — PAIN DESCRIPTION - PAIN TYPE: TYPE: ACUTE PAIN

## 2025-03-14 ASSESSMENT — PAIN DESCRIPTION - ORIENTATION: ORIENTATION: RIGHT

## 2025-03-14 ASSESSMENT — PAIN DESCRIPTION - LOCATION: LOCATION: LEG

## 2025-03-14 ASSESSMENT — PAIN - FUNCTIONAL ASSESSMENT: PAIN_FUNCTIONAL_ASSESSMENT: 0-10

## 2025-03-14 ASSESSMENT — PAIN DESCRIPTION - DESCRIPTORS: DESCRIPTORS: SHARP

## 2025-03-14 ASSESSMENT — PAIN SCALES - GENERAL: PAINLEVEL_OUTOF10: 10

## 2025-03-14 NOTE — ED TRIAGE NOTES
Pt came ambulatory to triage using cane c/o abnormal sodium result. Per pt, PCP called and told him to go to ED due to low sodium result.

## 2025-03-14 NOTE — TELEPHONE ENCOUNTER
Cheyanne with Dr Azul office contacted us. Patient is having surgery with Dr Kirkland Monday but his sodium today was very low: 123 and the hospital wanted to make Dr Wu aware before the surgery Monday.     Please call Cheyanne   [phone] ext [phone]

## 2025-03-14 NOTE — TELEPHONE ENCOUNTER
Tell them pt has been noncompliant with f/u with my office and the low sodium is due to chronic and severe alcohol dependence. He really needs to go to ER to be evaluated. He has been in denial about his alcohol use in the past.

## 2025-03-14 NOTE — ED PROVIDER NOTES
EMERGENCY DEPARTMENT HISTORY AND PHYSICAL EXAM      Date: 3/14/2025  Patient Name: Dallin Anne    History of Presenting Illness     Chief Complaint   Patient presents with    Abnormal Lab result    Right Leg Pain       History (Context): Dallin Anne is a 69 y.o. male  presents to the ED today with chief complaint of hyponatremia found on the labs for preop of the vasculature of right lower extremity.  The vascular surgeon is Dr. Aldo Noble.  Patient received a phone call that his sodium was very low.  Patient says he has extreme pain in his leg on the right and it is not controlled with the Norco.  Revascularization planned for Monday.  Still smokes.      PCP: Tex Wu MD    No current facility-administered medications for this encounter.     Current Outpatient Medications   Medication Sig Dispense Refill    HYDROmorphone (DILAUDID) 2 MG tablet Take 1 tablet by mouth every 6 hours as needed for Pain for up to 3 days. Max Daily Amount: 8 mg 12 tablet 0    HYDROcodone-acetaminophen (NORCO) 5-325 MG per tablet Take 1 tablet by mouth every 4 hours as needed for Pain for up to 7 days. Intended supply: 7 days. Take lowest dose possible to manage pain Max Daily Amount: 6 tablets 42 tablet 0    HYDROcodone-acetaminophen (NORCO) 5-325 MG per tablet Take 1 tablet by mouth every 4 hours as needed for Pain for up to 7 days. Intended supply: 7 days. Take lowest dose possible to manage pain Max Daily Amount: 6 tablets 42 tablet 0    fluticasone-umeclidin-vilant (TRELEGY ELLIPTA) 200-62.5-25 MCG/ACT AEPB inhaler inhale 1 puff  BY MOUTH daily 1 each 5    cyclobenzaprine (FLEXERIL) 10 MG tablet take 1 tablet by mouth three times a day if needed for muscle spasm 60 tablet 1    lidocaine (LIDODERM) 5 % Place 1 patch onto the skin daily 12 hours on, 12 hours off. 30 patch 5    ibuprofen (ADVIL;MOTRIN) 800 MG tablet take 1 tablet by mouth every 8 hours AS NEEDED FOR PAIN 90 tablet 5     P [phone] - F [phone]  [address] [place] [phone]      Phone: [phone]   HYDROmorphone 2 MG tablet         Disposition: d/c    Patient condition at time of disposition: Stable    DISCHARGE NOTE:   Pt has been reexamined. Patient has no new complaints, changes, or physical findings.  Care plan outlined and precautions discussed.  Results were reviewed with the patient. All medications were reviewed with the patient. All of pt's questions and concerns were addressed.  Alarm symptoms and return precautions associated with chief complaint and evaluation were reviewed with the patient in detail.  The patient demonstrated adequate understanding.  Patient was instructed to follow up with PCP, as well as given strict return precautions to the ED upon further deterioration. Patient is ready for discharge home.          [facility] Disclaimer     Please note that this dictation was completed with Forter, the computer voice recognition software.  Quite often unanticipated grammatical, syntax, homophones, and other interpretive errors are inadvertently transcribed by the computer software.  Please disregard these errors.  Please excuse any errors that have escaped final proofreading.      JED Trevizo Jana L, PA  03/14/25 7277

## 2025-03-14 NOTE — PROGRESS NOTES
INTERIM UPDATE - 1459 EST on 3/14/2025    [facility] ([facility]) ER Advanced Practice Clinician (miriam APC) calls requesting admission for a 69-year-old male who presents with complaint of Referred by Physician for Abnormal Labs.    Per [facility] ER Clinician, Patient was referred to [facility] ER by Anesthesiological services (?) for Hyponatremia.  Notably, Patient appears to have Hyponatremia today (Serum Sodium 128 mmol/L), but has this chronically with no measurement of Serum Sodium being hirer than 133 mmol/L since 12/04/2019 (5 years and 3 months) over 10 measurements (notably, over 5 different visits/occasions).     However, [facility] ER Clinician states that Patient's Home [place] is not adequately covering Patient's pain in his left leg and requests admission for a Intractable Pain 2°/2 Failed Outpatient Therapy.  Notably, Patient has been administered a single dose of IV Morphine 4 mg.    [facility] ER Clinician states that Patient's left leg \"looks bad\" and is cold.  Notably, Patient is scheduled to have interventional Vascular Surgery for Revascularization on Monday (3/17/2025).    Notably, [facility] ER Clinician states that Patient appears to be somewhat dry clinically in [facility] ER.    Plan:  Requested that [facility] ER Clinician attempt to control Patient's leg pain with Hydromorphone to determine if PO Hydromorphone could sufficiently manage Patient's Left Lower Extremity Pain prior to need for Revascularization.    Will await possible call-back regarding Patient's need for admission for Intractable Left Lower Extremity Pain.

## 2025-03-17 ENCOUNTER — HOSPITAL ENCOUNTER (INPATIENT)
Facility: HOSPITAL | Age: 70
LOS: 6 days | Discharge: HOME | DRG: 270 | End: 2025-03-23
Attending: SURGERY | Admitting: SURGERY
Payer: COMMERCIAL

## 2025-03-17 ENCOUNTER — APPOINTMENT (OUTPATIENT)
Facility: HOSPITAL | Age: 70
DRG: 270 | End: 2025-03-17
Attending: SURGERY
Payer: COMMERCIAL

## 2025-03-17 DIAGNOSIS — I70.201 FEMORAL ARTERY OCCLUSION, RIGHT: ICD-10-CM

## 2025-03-17 LAB
ABO + RH BLD: NORMAL
ANION GAP SERPL CALC-SCNC: 6 MMOL/L (ref 3–18)
ANION GAP SERPL CALC-SCNC: 9 MMOL/L (ref 3–18)
BLOOD GROUP ANTIBODIES SERPL: NORMAL
BUN SERPL-MCNC: 6 MG/DL (ref 7–18)
BUN SERPL-MCNC: 6 MG/DL (ref 7–18)
BUN/CREAT SERPL: 11 (ref 12–20)
BUN/CREAT SERPL: 12 (ref 12–20)
CALCIUM SERPL-MCNC: 9 MG/DL (ref 8.5–10.1)
CALCIUM SERPL-MCNC: 9.8 MG/DL (ref 8.5–10.1)
CHLORIDE SERPL-SCNC: 90 MMOL/L (ref 100–111)
CHLORIDE SERPL-SCNC: 93 MMOL/L (ref 100–111)
CO2 SERPL-SCNC: 24 MMOL/L (ref 21–32)
CO2 SERPL-SCNC: 30 MMOL/L (ref 21–32)
CREAT SERPL-MCNC: 0.52 MG/DL (ref 0.6–1.3)
CREAT SERPL-MCNC: 0.54 MG/DL (ref 0.6–1.3)
GLUCOSE SERPL-MCNC: 112 MG/DL (ref 74–99)
GLUCOSE SERPL-MCNC: 91 MG/DL (ref 74–99)
POTASSIUM SERPL-SCNC: 3.7 MMOL/L (ref 3.5–5.5)
POTASSIUM SERPL-SCNC: 4.4 MMOL/L (ref 3.5–5.5)
SODIUM SERPL-SCNC: 126 MMOL/L (ref 136–145)
SODIUM SERPL-SCNC: 126 MMOL/L (ref 136–145)
SPECIMEN EXP DATE BLD: NORMAL

## 2025-03-17 PROCEDURE — 6370000000 HC RX 637 (ALT 250 FOR IP): Performed by: NURSE ANESTHETIST, CERTIFIED REGISTERED

## 2025-03-17 PROCEDURE — 99232 SBSQ HOSP IP/OBS MODERATE 35: CPT | Performed by: STUDENT IN AN ORGANIZED HEALTH CARE EDUCATION/TRAINING PROGRAM

## 2025-03-17 PROCEDURE — 6360000002 HC RX W HCPCS: Performed by: SURGERY

## 2025-03-17 PROCEDURE — 2580000003 HC RX 258: Performed by: NURSE ANESTHETIST, CERTIFIED REGISTERED

## 2025-03-17 PROCEDURE — 1100000000 HC RM PRIVATE

## 2025-03-17 PROCEDURE — 2580000003 HC RX 258: Performed by: STUDENT IN AN ORGANIZED HEALTH CARE EDUCATION/TRAINING PROGRAM

## 2025-03-17 PROCEDURE — 2709999900 HC NON-CHARGEABLE SUPPLY: Performed by: SURGERY

## 2025-03-17 PROCEDURE — 86901 BLOOD TYPING SEROLOGIC RH(D): CPT

## 2025-03-17 PROCEDURE — 86850 RBC ANTIBODY SCREEN: CPT

## 2025-03-17 PROCEDURE — 80048 BASIC METABOLIC PNL TOTAL CA: CPT

## 2025-03-17 PROCEDURE — 86900 BLOOD TYPING SEROLOGIC ABO: CPT

## 2025-03-17 PROCEDURE — 36415 COLL VENOUS BLD VENIPUNCTURE: CPT

## 2025-03-17 PROCEDURE — 6370000000 HC RX 637 (ALT 250 FOR IP): Performed by: SURGERY

## 2025-03-17 RX ORDER — SODIUM CHLORIDE 9 MG/ML
INJECTION, SOLUTION INTRAVENOUS PRN
Status: DISCONTINUED | OUTPATIENT
Start: 2025-03-17 | End: 2025-03-17 | Stop reason: HOSPADM

## 2025-03-17 RX ORDER — DIAZEPAM 10 MG/1
20 TABLET ORAL
Status: DISCONTINUED | OUTPATIENT
Start: 2025-03-17 | End: 2025-03-23 | Stop reason: HOSPADM

## 2025-03-17 RX ORDER — SODIUM CHLORIDE 0.9 % (FLUSH) 0.9 %
5-40 SYRINGE (ML) INJECTION EVERY 12 HOURS SCHEDULED
Status: DISCONTINUED | OUTPATIENT
Start: 2025-03-17 | End: 2025-03-17 | Stop reason: HOSPADM

## 2025-03-17 RX ORDER — GAUZE BANDAGE 2" X 2"
100 BANDAGE TOPICAL DAILY
Status: DISCONTINUED | OUTPATIENT
Start: 2025-03-17 | End: 2025-03-23 | Stop reason: HOSPADM

## 2025-03-17 RX ORDER — SODIUM CHLORIDE, SODIUM LACTATE, POTASSIUM CHLORIDE, CALCIUM CHLORIDE 600; 310; 30; 20 MG/100ML; MG/100ML; MG/100ML; MG/100ML
INJECTION, SOLUTION INTRAVENOUS CONTINUOUS
Status: DISCONTINUED | OUTPATIENT
Start: 2025-03-17 | End: 2025-03-17 | Stop reason: HOSPADM

## 2025-03-17 RX ORDER — ALBUTEROL SULFATE 0.83 MG/ML
2.5 SOLUTION RESPIRATORY (INHALATION) EVERY 6 HOURS PRN
Status: DISCONTINUED | OUTPATIENT
Start: 2025-03-17 | End: 2025-03-23 | Stop reason: HOSPADM

## 2025-03-17 RX ORDER — FAMOTIDINE 20 MG/1
20 TABLET, FILM COATED ORAL ONCE
Status: COMPLETED | OUTPATIENT
Start: 2025-03-17 | End: 2025-03-17

## 2025-03-17 RX ORDER — ALBUTEROL SULFATE 90 UG/1
2 INHALANT RESPIRATORY (INHALATION) EVERY 4 HOURS PRN
Status: DISCONTINUED | OUTPATIENT
Start: 2025-03-17 | End: 2025-03-17

## 2025-03-17 RX ORDER — IPRATROPIUM BROMIDE AND ALBUTEROL SULFATE 2.5; .5 MG/3ML; MG/3ML
1 SOLUTION RESPIRATORY (INHALATION) EVERY 4 HOURS PRN
Status: DISCONTINUED | OUTPATIENT
Start: 2025-03-17 | End: 2025-03-23 | Stop reason: HOSPADM

## 2025-03-17 RX ORDER — DIAZEPAM 10 MG/1
10 TABLET ORAL
Status: DISCONTINUED | OUTPATIENT
Start: 2025-03-17 | End: 2025-03-23 | Stop reason: HOSPADM

## 2025-03-17 RX ORDER — SODIUM CHLORIDE 9 MG/ML
INJECTION, SOLUTION INTRAVENOUS CONTINUOUS
Status: DISCONTINUED | OUTPATIENT
Start: 2025-03-17 | End: 2025-03-17 | Stop reason: HOSPADM

## 2025-03-17 RX ORDER — BUDESONIDE 0.5 MG/2ML
1 INHALANT ORAL
Status: DISCONTINUED | OUTPATIENT
Start: 2025-03-17 | End: 2025-03-18

## 2025-03-17 RX ORDER — DIAZEPAM 5 MG/1
5 TABLET ORAL
Status: DISCONTINUED | OUTPATIENT
Start: 2025-03-17 | End: 2025-03-23 | Stop reason: HOSPADM

## 2025-03-17 RX ORDER — FLUTICASONE PROPIONATE 50 MCG
2 SPRAY, SUSPENSION (ML) NASAL DAILY
Status: DISCONTINUED | OUTPATIENT
Start: 2025-03-17 | End: 2025-03-23 | Stop reason: HOSPADM

## 2025-03-17 RX ORDER — DIAZEPAM 10 MG/2ML
10 INJECTION, SOLUTION INTRAMUSCULAR; INTRAVENOUS
Status: DISCONTINUED | OUTPATIENT
Start: 2025-03-17 | End: 2025-03-23 | Stop reason: HOSPADM

## 2025-03-17 RX ORDER — HYDRALAZINE HYDROCHLORIDE 20 MG/ML
10 INJECTION INTRAMUSCULAR; INTRAVENOUS EVERY 4 HOURS PRN
Status: DISCONTINUED | OUTPATIENT
Start: 2025-03-17 | End: 2025-03-23 | Stop reason: HOSPADM

## 2025-03-17 RX ORDER — DIAZEPAM 10 MG/2ML
15 INJECTION, SOLUTION INTRAMUSCULAR; INTRAVENOUS
Status: DISCONTINUED | OUTPATIENT
Start: 2025-03-17 | End: 2025-03-23 | Stop reason: HOSPADM

## 2025-03-17 RX ORDER — PROMETHAZINE HYDROCHLORIDE 12.5 MG/1
12.5 TABLET ORAL ONCE
Status: COMPLETED | OUTPATIENT
Start: 2025-03-17 | End: 2025-03-17

## 2025-03-17 RX ORDER — HYDRALAZINE HYDROCHLORIDE 20 MG/ML
10 INJECTION INTRAMUSCULAR; INTRAVENOUS ONCE
Status: COMPLETED | OUTPATIENT
Start: 2025-03-17 | End: 2025-03-17

## 2025-03-17 RX ORDER — HYDROCODONE BITARTRATE AND ACETAMINOPHEN 5; 325 MG/1; MG/1
1 TABLET ORAL EVERY 4 HOURS PRN
Refills: 0 | Status: DISCONTINUED | OUTPATIENT
Start: 2025-03-17 | End: 2025-03-23 | Stop reason: HOSPADM

## 2025-03-17 RX ORDER — CYCLOBENZAPRINE HCL 10 MG
10 TABLET ORAL 3 TIMES DAILY PRN
Status: DISCONTINUED | OUTPATIENT
Start: 2025-03-17 | End: 2025-03-23 | Stop reason: HOSPADM

## 2025-03-17 RX ORDER — SODIUM CHLORIDE 0.9 % (FLUSH) 0.9 %
5-40 SYRINGE (ML) INJECTION PRN
Status: DISCONTINUED | OUTPATIENT
Start: 2025-03-17 | End: 2025-03-17 | Stop reason: HOSPADM

## 2025-03-17 RX ORDER — DIAZEPAM 10 MG/2ML
20 INJECTION, SOLUTION INTRAMUSCULAR; INTRAVENOUS
Status: DISCONTINUED | OUTPATIENT
Start: 2025-03-17 | End: 2025-03-23 | Stop reason: HOSPADM

## 2025-03-17 RX ORDER — SODIUM CHLORIDE 9 MG/ML
INJECTION, SOLUTION INTRAVENOUS CONTINUOUS
Status: DISCONTINUED | OUTPATIENT
Start: 2025-03-17 | End: 2025-03-18

## 2025-03-17 RX ORDER — DIAZEPAM 10 MG/2ML
5 INJECTION, SOLUTION INTRAMUSCULAR; INTRAVENOUS
Status: DISCONTINUED | OUTPATIENT
Start: 2025-03-17 | End: 2025-03-23 | Stop reason: HOSPADM

## 2025-03-17 RX ORDER — ARFORMOTEROL TARTRATE 15 UG/2ML
15 SOLUTION RESPIRATORY (INHALATION)
Status: DISCONTINUED | OUTPATIENT
Start: 2025-03-17 | End: 2025-03-18

## 2025-03-17 RX ADMIN — FAMOTIDINE 20 MG: 20 TABLET, FILM COATED ORAL at 10:22

## 2025-03-17 RX ADMIN — HYDROMORPHONE HYDROCHLORIDE 0.5 MG: 1 INJECTION, SOLUTION INTRAMUSCULAR; INTRAVENOUS; SUBCUTANEOUS at 15:03

## 2025-03-17 RX ADMIN — SODIUM CHLORIDE: 0.9 INJECTION, SOLUTION INTRAVENOUS at 20:47

## 2025-03-17 RX ADMIN — PROMETHAZINE HYDROCHLORIDE 12.5 MG: 12.5 TABLET ORAL at 10:22

## 2025-03-17 RX ADMIN — HYDROCODONE BITARTRATE AND ACETAMINOPHEN 1 TABLET: 5; 325 TABLET ORAL at 13:39

## 2025-03-17 RX ADMIN — HYDRALAZINE HYDROCHLORIDE 10 MG: 20 INJECTION INTRAMUSCULAR; INTRAVENOUS at 19:55

## 2025-03-17 RX ADMIN — SODIUM CHLORIDE, SODIUM LACTATE, POTASSIUM CHLORIDE, AND CALCIUM CHLORIDE: .6; .31; .03; .02 INJECTION, SOLUTION INTRAVENOUS at 10:10

## 2025-03-17 ASSESSMENT — PAIN SCALES - GENERAL
PAINLEVEL_OUTOF10: 8
PAINLEVEL_OUTOF10: 0
PAINLEVEL_OUTOF10: 0
PAINLEVEL_OUTOF10: 10
PAINLEVEL_OUTOF10: 0

## 2025-03-17 ASSESSMENT — PAIN DESCRIPTION - LOCATION: LOCATION: LEG

## 2025-03-17 ASSESSMENT — PAIN DESCRIPTION - ORIENTATION: ORIENTATION: RIGHT

## 2025-03-17 NOTE — H&P
Surgery History and Physical    Subjective:      Dallin Anne is a 69 y.o.  male who presents with persistent hyponatremia and ischemia of the right foot.  Had discussed with my anesthesia provider today, would not recommend anesthesia until sodium is more corrected.  Discussed options with hospitalist.  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    Compression fracture of L5 lumbar vertebra, closed, initial encounter ([facility]) 04/03/2023    Moderate protein-calorie malnutrition 01/20/2023    COPD, group D, by GOLD 2017 classification ([facility]) 11/16/2021    Chronic bronchitis ([facility]) 11/16/2021    Personal history of tobacco use 11/16/2021    Mycobacterial infection, 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/2021    Pneumonia 06/03/2021    Rib pain on left side 12/16/2016    Fall from standing 12/16/2016    ED (erectile dysfunction) 11/10/2010    Sinusitis 11/10/2010    DJD (degenerative joint disease) of knee 11/10/2010    GERD (gastroesophageal reflux disease) 11/10/2010     Past Medical History:   Diagnosis Date    Ankle fracture, left 10/19/2008    COPD (chronic obstructive pulmonary disease) ([facility])     Cough     DJD (degenerative joint disease) of knee 11/10/2010    ED (erectile dysfunction) 11/10/2010    Fracture of left tibia 10/19/2008    Severe Grade IIIB    GERD (gastroesophageal reflux disease) 11/10/2010    denies    Hearing loss     Left ankle pain     Left ankle sprain     Left ankle strain     Pain with urination     Pneumonia     Sinusitis 11/10/2010    Wears glasses       Past Surgical History:   Procedure Laterality Date    ANESTH,SURGERY OF SHOULDER      11/27/17    ANKLE FRACTURE SURGERY  10/20/2008    IR KYPHOPLASTY THORACIC 1 VERTEBRAL BODY  7/6/2023    IR KYPHOPLASTY THORACIC FIRST LEVEL 7/6/2023 Gonzalo Amaya MD [facility]GIO IR    ORTHOPEDIC SURGERY

## 2025-03-17 NOTE — PERIOP NOTE
Patient /Family /Designee has been informed that [facility] is not responsible for patient belongings per policy and the signed [facility] Patient Agreement document.  Personal items should be sent home or checked in with security.  Patient /Family /Designee selected the following action:                            [x]  Send personal items home with a family member or friend                                                 []  Check in personal items with security, excluding clothing                            []  Maintain personal items at the bedside, against recommendation                                 by Toñito Edwards [facility]                                   ** If patient /family /designee chooses to maintain personal items at the bedside,                                      Complete the patient belongings inventory in the EMR.

## 2025-03-17 NOTE — FLOWSHEET NOTE
03/17/25 1815   Vital Signs   Temp 98.2 °F (36.8 °C)   Temp Source Oral   Pulse 93   Heart Rate Source Monitor   Respirations 18   BP (!) 155/76   MAP (Calculated) 102   BP Location Right upper arm   BP Method Automatic   Patient Position Semi fowlers     BP IMPROVED FROM 187s NOT GIVEN

## 2025-03-18 PROBLEM — E43 SEVERE PROTEIN-CALORIE MALNUTRITION: Status: ACTIVE | Noted: 2023-01-20

## 2025-03-18 LAB
ANION GAP SERPL CALC-SCNC: 10 MMOL/L (ref 3–18)
ANION GAP SERPL CALC-SCNC: 10 MMOL/L (ref 3–18)
APTT PPP: 113.7 SEC (ref 23–36.4)
APTT PPP: 46.3 SEC (ref 23–36.4)
BUN SERPL-MCNC: 6 MG/DL (ref 7–18)
BUN SERPL-MCNC: 9 MG/DL (ref 7–18)
BUN/CREAT SERPL: 14 (ref 12–20)
BUN/CREAT SERPL: 17 (ref 12–20)
CALCIUM SERPL-MCNC: 8.6 MG/DL (ref 8.5–10.1)
CALCIUM SERPL-MCNC: 9 MG/DL (ref 8.5–10.1)
CHLORIDE SERPL-SCNC: 95 MMOL/L (ref 100–111)
CHLORIDE SERPL-SCNC: 95 MMOL/L (ref 100–111)
CO2 SERPL-SCNC: 21 MMOL/L (ref 21–32)
CO2 SERPL-SCNC: 23 MMOL/L (ref 21–32)
CREAT SERPL-MCNC: 0.42 MG/DL (ref 0.6–1.3)
CREAT SERPL-MCNC: 0.53 MG/DL (ref 0.6–1.3)
ERYTHROCYTE [DISTWIDTH] IN BLOOD BY AUTOMATED COUNT: 12.6 % (ref 11.6–14.5)
ERYTHROCYTE [DISTWIDTH] IN BLOOD BY AUTOMATED COUNT: 12.7 % (ref 11.6–14.5)
GLUCOSE SERPL-MCNC: 101 MG/DL (ref 74–99)
GLUCOSE SERPL-MCNC: 105 MG/DL (ref 74–99)
HCT VFR BLD AUTO: 39.2 % (ref 36–48)
HCT VFR BLD AUTO: 39.8 % (ref 36–48)
HGB BLD-MCNC: 13.4 G/DL (ref 13–16)
HGB BLD-MCNC: 13.7 G/DL (ref 13–16)
MCH RBC QN AUTO: 32.4 PG (ref 24–34)
MCH RBC QN AUTO: 33.1 PG (ref 24–34)
MCHC RBC AUTO-ENTMCNC: 34.2 G/DL (ref 31–37)
MCHC RBC AUTO-ENTMCNC: 34.4 G/DL (ref 31–37)
MCV RBC AUTO: 94.7 FL (ref 78–100)
MCV RBC AUTO: 96.1 FL (ref 78–100)
NRBC # BLD: 0 K/UL (ref 0–0.01)
NRBC # BLD: 0 K/UL (ref 0–0.01)
NRBC BLD-RTO: 0 PER 100 WBC
NRBC BLD-RTO: 0 PER 100 WBC
OSMOLALITY SERPL: 262 MOSM/KG H2O (ref 280–301)
PLATELET # BLD AUTO: 357 K/UL (ref 135–420)
PLATELET # BLD AUTO: 360 K/UL (ref 135–420)
PMV BLD AUTO: 9.3 FL (ref 9.2–11.8)
PMV BLD AUTO: 9.7 FL (ref 9.2–11.8)
POTASSIUM SERPL-SCNC: 3.3 MMOL/L (ref 3.5–5.5)
POTASSIUM SERPL-SCNC: 3.6 MMOL/L (ref 3.5–5.5)
RBC # BLD AUTO: 4.14 M/UL (ref 4.35–5.65)
RBC # BLD AUTO: 4.14 M/UL (ref 4.35–5.65)
SODIUM SERPL-SCNC: 126 MMOL/L (ref 136–145)
SODIUM SERPL-SCNC: 128 MMOL/L (ref 136–145)
T4 FREE SERPL-MCNC: 1.3 NG/DL (ref 0.7–1.5)
TSH SERPL DL<=0.05 MIU/L-ACNC: 4.18 UIU/ML (ref 0.36–3.74)
WBC # BLD AUTO: 10.7 K/UL (ref 4.6–13.2)
WBC # BLD AUTO: 9.5 K/UL (ref 4.6–13.2)

## 2025-03-18 PROCEDURE — 2580000003 HC RX 258: Performed by: STUDENT IN AN ORGANIZED HEALTH CARE EDUCATION/TRAINING PROGRAM

## 2025-03-18 PROCEDURE — 84443 ASSAY THYROID STIM HORMONE: CPT

## 2025-03-18 PROCEDURE — 99232 SBSQ HOSP IP/OBS MODERATE 35: CPT | Performed by: HOSPITALIST

## 2025-03-18 PROCEDURE — 6370000000 HC RX 637 (ALT 250 FOR IP): Performed by: STUDENT IN AN ORGANIZED HEALTH CARE EDUCATION/TRAINING PROGRAM

## 2025-03-18 PROCEDURE — 6370000000 HC RX 637 (ALT 250 FOR IP): Performed by: INTERNAL MEDICINE

## 2025-03-18 PROCEDURE — 6360000002 HC RX W HCPCS: Performed by: STUDENT IN AN ORGANIZED HEALTH CARE EDUCATION/TRAINING PROGRAM

## 2025-03-18 PROCEDURE — 82533 TOTAL CORTISOL: CPT

## 2025-03-18 PROCEDURE — 80048 BASIC METABOLIC PNL TOTAL CA: CPT

## 2025-03-18 PROCEDURE — P9047 ALBUMIN (HUMAN), 25%, 50ML: HCPCS | Performed by: INTERNAL MEDICINE

## 2025-03-18 PROCEDURE — 6360000002 HC RX W HCPCS: Performed by: INTERNAL MEDICINE

## 2025-03-18 PROCEDURE — 84439 ASSAY OF FREE THYROXINE: CPT

## 2025-03-18 PROCEDURE — 6370000000 HC RX 637 (ALT 250 FOR IP): Performed by: SURGERY

## 2025-03-18 PROCEDURE — 85730 THROMBOPLASTIN TIME PARTIAL: CPT

## 2025-03-18 PROCEDURE — 36415 COLL VENOUS BLD VENIPUNCTURE: CPT

## 2025-03-18 PROCEDURE — 94761 N-INVAS EAR/PLS OXIMETRY MLT: CPT

## 2025-03-18 PROCEDURE — 94640 AIRWAY INHALATION TREATMENT: CPT

## 2025-03-18 PROCEDURE — 1100000000 HC RM PRIVATE

## 2025-03-18 PROCEDURE — 83930 ASSAY OF BLOOD OSMOLALITY: CPT

## 2025-03-18 PROCEDURE — 6360000002 HC RX W HCPCS: Performed by: SURGERY

## 2025-03-18 PROCEDURE — 85027 COMPLETE CBC AUTOMATED: CPT

## 2025-03-18 RX ORDER — HEPARIN SODIUM 1000 [USP'U]/ML
80 INJECTION, SOLUTION INTRAVENOUS; SUBCUTANEOUS ONCE
Status: COMPLETED | OUTPATIENT
Start: 2025-03-18 | End: 2025-03-18

## 2025-03-18 RX ORDER — NALOXONE HYDROCHLORIDE 0.4 MG/ML
INJECTION, SOLUTION INTRAMUSCULAR; INTRAVENOUS; SUBCUTANEOUS
Status: DISCONTINUED
Start: 2025-03-18 | End: 2025-03-18 | Stop reason: WASHOUT

## 2025-03-18 RX ORDER — HEPARIN SODIUM 10000 [USP'U]/100ML
5-30 INJECTION, SOLUTION INTRAVENOUS CONTINUOUS
Status: DISCONTINUED | OUTPATIENT
Start: 2025-03-18 | End: 2025-03-21

## 2025-03-18 RX ORDER — BUDESONIDE 0.5 MG/2ML
1 INHALANT ORAL
Status: DISCONTINUED | OUTPATIENT
Start: 2025-03-18 | End: 2025-03-22

## 2025-03-18 RX ORDER — SODIUM CHLORIDE 1 G/1
1 TABLET ORAL EVERY 8 HOURS
Status: DISPENSED | OUTPATIENT
Start: 2025-03-18 | End: 2025-03-21

## 2025-03-18 RX ORDER — HEPARIN SODIUM 1000 [USP'U]/ML
40 INJECTION, SOLUTION INTRAVENOUS; SUBCUTANEOUS PRN
Status: DISCONTINUED | OUTPATIENT
Start: 2025-03-18 | End: 2025-03-23

## 2025-03-18 RX ORDER — ARFORMOTEROL TARTRATE 15 UG/2ML
15 SOLUTION RESPIRATORY (INHALATION)
Status: DISCONTINUED | OUTPATIENT
Start: 2025-03-18 | End: 2025-03-22

## 2025-03-18 RX ORDER — HEPARIN SODIUM 1000 [USP'U]/ML
80 INJECTION, SOLUTION INTRAVENOUS; SUBCUTANEOUS PRN
Status: DISCONTINUED | OUTPATIENT
Start: 2025-03-18 | End: 2025-03-23

## 2025-03-18 RX ORDER — FOLIC ACID 1 MG/1
1 TABLET ORAL DAILY
Status: DISCONTINUED | OUTPATIENT
Start: 2025-03-18 | End: 2025-03-23 | Stop reason: HOSPADM

## 2025-03-18 RX ORDER — FUROSEMIDE 10 MG/ML
20 INJECTION INTRAMUSCULAR; INTRAVENOUS ONCE
Status: COMPLETED | OUTPATIENT
Start: 2025-03-18 | End: 2025-03-18

## 2025-03-18 RX ORDER — ALBUMIN (HUMAN) 12.5 G/50ML
25 SOLUTION INTRAVENOUS ONCE
Status: COMPLETED | OUTPATIENT
Start: 2025-03-18 | End: 2025-03-18

## 2025-03-18 RX ADMIN — SODIUM CHLORIDE: 0.9 INJECTION, SOLUTION INTRAVENOUS at 08:08

## 2025-03-18 RX ADMIN — ARFORMOTEROL TARTRATE 15 MCG: 15 SOLUTION RESPIRATORY (INHALATION) at 08:21

## 2025-03-18 RX ADMIN — ALBUMIN (HUMAN) 25 G: 0.25 INJECTION, SOLUTION INTRAVENOUS at 18:15

## 2025-03-18 RX ADMIN — HEPARIN SODIUM 18 UNITS/KG/HR: 10000 INJECTION, SOLUTION INTRAVENOUS at 07:59

## 2025-03-18 RX ADMIN — HYDRALAZINE HYDROCHLORIDE 10 MG: 20 INJECTION INTRAMUSCULAR; INTRAVENOUS at 04:05

## 2025-03-18 RX ADMIN — IPRATROPIUM BROMIDE 0.5 MG: 0.5 SOLUTION RESPIRATORY (INHALATION) at 20:09

## 2025-03-18 RX ADMIN — FUROSEMIDE 20 MG: 10 INJECTION, SOLUTION INTRAMUSCULAR; INTRAVENOUS at 17:47

## 2025-03-18 RX ADMIN — Medication 1 G: at 17:48

## 2025-03-18 RX ADMIN — Medication 100 MG: at 03:11

## 2025-03-18 RX ADMIN — HYDROCODONE BITARTRATE AND ACETAMINOPHEN 1 TABLET: 5; 325 TABLET ORAL at 18:24

## 2025-03-18 RX ADMIN — IPRATROPIUM BROMIDE 0.5 MG: 0.5 SOLUTION RESPIRATORY (INHALATION) at 08:21

## 2025-03-18 RX ADMIN — FOLIC ACID 1 MG: 1 TABLET ORAL at 18:14

## 2025-03-18 RX ADMIN — BUDESONIDE 1000 MCG: 0.5 INHALANT RESPIRATORY (INHALATION) at 08:21

## 2025-03-18 RX ADMIN — BUDESONIDE 1000 MCG: 0.5 INHALANT RESPIRATORY (INHALATION) at 20:09

## 2025-03-18 RX ADMIN — HEPARIN SODIUM 4200 UNITS: 1000 INJECTION INTRAVENOUS; SUBCUTANEOUS at 08:02

## 2025-03-18 RX ADMIN — HYDROCODONE BITARTRATE AND ACETAMINOPHEN 1 TABLET: 5; 325 TABLET ORAL at 22:17

## 2025-03-18 RX ADMIN — Medication 100 MG: at 08:06

## 2025-03-18 RX ADMIN — FLUTICASONE PROPIONATE 2 SPRAY: 50 SPRAY, METERED NASAL at 09:12

## 2025-03-18 RX ADMIN — HYDRALAZINE HYDROCHLORIDE 10 MG: 20 INJECTION INTRAMUSCULAR; INTRAVENOUS at 00:06

## 2025-03-18 RX ADMIN — HYDRALAZINE HYDROCHLORIDE 10 MG: 20 INJECTION INTRAMUSCULAR; INTRAVENOUS at 12:01

## 2025-03-18 RX ADMIN — HEPARIN SODIUM 4200 UNITS: 1000 INJECTION INTRAVENOUS; SUBCUTANEOUS at 17:46

## 2025-03-18 RX ADMIN — HYDROCODONE BITARTRATE AND ACETAMINOPHEN 1 TABLET: 5; 325 TABLET ORAL at 08:06

## 2025-03-18 RX ADMIN — ARFORMOTEROL TARTRATE 15 MCG: 15 SOLUTION RESPIRATORY (INHALATION) at 20:09

## 2025-03-18 RX ADMIN — DIAZEPAM 10 MG: 5 INJECTION, SOLUTION INTRAMUSCULAR; INTRAVENOUS at 18:26

## 2025-03-18 RX ADMIN — HYDROCODONE BITARTRATE AND ACETAMINOPHEN 1 TABLET: 5; 325 TABLET ORAL at 03:11

## 2025-03-18 ASSESSMENT — PAIN DESCRIPTION - ORIENTATION
ORIENTATION: RIGHT
ORIENTATION: LOWER;RIGHT
ORIENTATION: DISTAL
ORIENTATION: RIGHT

## 2025-03-18 ASSESSMENT — PAIN DESCRIPTION - ONSET
ONSET: GRADUAL
ONSET: ON-GOING
ONSET: GRADUAL

## 2025-03-18 ASSESSMENT — PAIN - FUNCTIONAL ASSESSMENT
PAIN_FUNCTIONAL_ASSESSMENT: PREVENTS OR INTERFERES SOME ACTIVE ACTIVITIES AND ADLS
PAIN_FUNCTIONAL_ASSESSMENT: ACTIVITIES ARE NOT PREVENTED
PAIN_FUNCTIONAL_ASSESSMENT: ACTIVITIES ARE NOT PREVENTED

## 2025-03-18 ASSESSMENT — PAIN DESCRIPTION - DESCRIPTORS
DESCRIPTORS: ACHING
DESCRIPTORS: TENDER;SORE
DESCRIPTORS: ACHING
DESCRIPTORS: ACHING

## 2025-03-18 ASSESSMENT — PAIN SCALES - GENERAL
PAINLEVEL_OUTOF10: 6
PAINLEVEL_OUTOF10: 7
PAINLEVEL_OUTOF10: 0
PAINLEVEL_OUTOF10: 6
PAINLEVEL_OUTOF10: 0
PAINLEVEL_OUTOF10: 6

## 2025-03-18 ASSESSMENT — PAIN DESCRIPTION - DIRECTION: RADIATING_TOWARDS: NO

## 2025-03-18 ASSESSMENT — PAIN DESCRIPTION - LOCATION
LOCATION: BACK
LOCATION: GENERALIZED;LEG
LOCATION: LEG
LOCATION: LEG

## 2025-03-18 ASSESSMENT — PAIN DESCRIPTION - PAIN TYPE
TYPE: ACUTE PAIN

## 2025-03-18 ASSESSMENT — PAIN DESCRIPTION - FREQUENCY
FREQUENCY: INTERMITTENT

## 2025-03-18 ASSESSMENT — PAIN SCALES - WONG BAKER: WONGBAKER_NUMERICALRESPONSE: NO HURT

## 2025-03-18 NOTE — PLAN OF CARE
Problem: Pain  Goal: Verbalizes/displays adequate comfort level or baseline comfort level  3/17/2025 2141 by Aguilar Espinal, RN  Outcome: Progressing  Flowsheets (Taken 3/17/2025 2141)  Verbalizes/displays adequate comfort level or baseline comfort level:   Assess pain using appropriate pain scale   Administer analgesics based on type and severity of pain and evaluate response  3/17/2025 2140 by Aguilar Espinal RN  Outcome: Progressing  3/17/2025 1959 by Gita Holman RN  Outcome: Progressing     Problem: Discharge Planning  Goal: Discharge to home or other facility with appropriate resources  3/17/2025 2140 by Aguilar Espinal, RN  Outcome: Progressing  3/17/2025 1959 by Gita Holman, RN  Outcome: Progressing

## 2025-03-18 NOTE — DISCHARGE INSTR - DIET
Good nutrition is important when healing from an illness, injury, or surgery.  Follow any nutrition recommendations given to you during your hospital stay.   If you were given an oral nutrition supplement while in the hospital, continue to take this supplement at home.  You can take it with meals, in-between meals, and/or before bedtime. These supplements can be purchased at most local grocery stores, pharmacies, and chain super-stores.   If you have any questions about your diet or nutrition, call the hospital and ask for the dietitian.  Recommend Continuing Oral Nutrition Supplement (ONS) at Discharge.   Ensure Complete or Ensure Plus or a comparable/similar product 2-3 times daily for 30 days unless otherwise directed by your Primary Care Physician.     Nutrition Supplements  If you are not able to eat enough food or if you have extra calorie requirements, oral supplements can provide you with additional calories, protein, vitamins and minerals.     Where do you find oral Nutrition Supplements?  These supplements are available at most pharmacies, grocery retailers like [employer], and many others.    How do I pay for these oral supplements?  1. Use coupons  2. Go online to register for coupons and health tips!   www.ensure.com - click “register”  www.boost/com - click “coupons and videos”  [employer] - click “breakfast club”  3. Investigate your eligibility for prescription assistance: www.pparx.org/en/prescription_assistance_programs/discount_cards  4. Get co-pay card to get a discount of at least $10 (with your 30 day prescription), on some insurance plans with card will cover the ensure fee of oral supplements (making it free!)    Yolanda Lazaro RD

## 2025-03-18 NOTE — FLOWSHEET NOTE
03/18/25 1147   Vital Signs   Temp 97.5 °F (36.4 °C)   Temp Source Oral   Pulse 95   Heart Rate Source Monitor   Respirations 18   BP (!) 178/87  (nurse notified)   MAP (Calculated) 117   BP Location Left upper arm   BP Method Automatic   Patient Position Supine     Medicated with PRN

## 2025-03-18 NOTE — CARE COORDINATION
03/18/25 1249   Service Assessment   Patient Orientation Alert and Oriented;Place;Person;Situation;Self   Cognition Alert   History Provided By Patient   Primary Caregiver Self   Accompanied By/Relationship no one at bedside   Support Systems Spouse/Significant Other   Patient's Healthcare Decision Maker is: Legal Next of Kin   PCP Verified by CM Yes   Last Visit to PCP Within last year  (Last telehealth request 1 month ago, Last in person appt Las year.)   Prior Functional Level Independent in ADLs/IADLs   Current Functional Level Independent in ADLs/IADLs   Can patient return to prior living arrangement Yes   Ability to make needs known: Good   Family able to assist with home care needs: Yes   Would you like for me to discuss the discharge plan with any other family members/significant others, and if so, who? Yes   Financial Resources None   Community Resources None   CM/SW Referral Other (see comment)  (Discharge planning)   Social/Functional History   Lives With Spouse   Type of Home House   Home Layout One level   Home Access Stairs to enter with rails   Entrance Stairs - Number of Steps 5   Entrance Stairs - Rails Both   Bathroom Shower/Tub Tub/Shower unit   Bathroom Toilet Standard   Bathroom Equipment Shower chair;Toilet raiser   Bathroom Accessibility Accessible   Home Equipment Wheelchair - Manual;Walker - Standard;Rollator   Receives Help From Family   Prior Level of Assist for ADLs Independent   Prior Level of Assist for Homemaking Independent   Homemaking Responsibilities Yes   Ambulation Assistance Independent   Prior Level of Assist for Transfers Independent   Active  Yes   Mode of Transportation Car;SUV  (Spouse usualy drives, I still have my license but the last time I drove was last year)   Education N/A   Occupation Retired   Type of Occupation N/A   Discharge Planning   Type of Residence House   Living Arrangements Spouse/Significant Other   Current Services Prior To Admission Durable

## 2025-03-18 NOTE — CARE COORDINATION
CM placed Home Health referrals out in [facility] for discharge planning.           Flora Archer RN Case Manager  [phone]

## 2025-03-18 NOTE — PLAN OF CARE
Problem: Pain  Goal: Verbalizes/displays adequate comfort level or baseline comfort level  Outcome: Progressing  Flowsheets (Taken 3/17/2025 2141 by Aguilar Espinal, RN)  Verbalizes/displays adequate comfort level or baseline comfort level:   Assess pain using appropriate pain scale   Administer analgesics based on type and severity of pain and evaluate response     Problem: Safety - Adult  Goal: Free from fall injury  Outcome: Progressing  Flowsheets (Taken 3/18/2025 1958)  Free From Fall Injury:   Instruct family/caregiver on patient safety   Based on caregiver fall risk screen, instruct family/caregiver to ask for assistance with transferring infant if caregiver noted to have fall risk factors      Camilo   Prescription assitance program application for [employer] in basket   Mail in inter office envelope provided, send copy to abstraction

## 2025-03-19 ENCOUNTER — ANESTHESIA EVENT (OUTPATIENT)
Facility: HOSPITAL | Age: 70
End: 2025-03-19
Payer: COMMERCIAL

## 2025-03-19 LAB
ANION GAP SERPL CALC-SCNC: 5 MMOL/L (ref 3–18)
ANION GAP SERPL CALC-SCNC: 8 MMOL/L (ref 3–18)
APTT PPP: 43.2 SEC (ref 23–36.4)
APTT PPP: 69 SEC (ref 23–36.4)
APTT PPP: 95.8 SEC (ref 23–36.4)
BUN SERPL-MCNC: 12 MG/DL (ref 7–18)
BUN SERPL-MCNC: 7 MG/DL (ref 7–18)
BUN/CREAT SERPL: 16 (ref 12–20)
BUN/CREAT SERPL: 26 (ref 12–20)
CALCIUM SERPL-MCNC: 8.6 MG/DL (ref 8.5–10.1)
CALCIUM SERPL-MCNC: 8.8 MG/DL (ref 8.5–10.1)
CHLORIDE SERPL-SCNC: 97 MMOL/L (ref 100–111)
CHLORIDE SERPL-SCNC: 97 MMOL/L (ref 100–111)
CO2 SERPL-SCNC: 23 MMOL/L (ref 21–32)
CO2 SERPL-SCNC: 25 MMOL/L (ref 21–32)
CORTIS AM PEAK SERPL-MCNC: 13.3 UG/DL (ref 4.3–22.45)
CORTIS AM PEAK SERPL-MCNC: 14 UG/DL (ref 4.3–22.45)
CREAT SERPL-MCNC: 0.43 MG/DL (ref 0.6–1.3)
CREAT SERPL-MCNC: 0.47 MG/DL (ref 0.6–1.3)
GLUCOSE SERPL-MCNC: 102 MG/DL (ref 74–99)
GLUCOSE SERPL-MCNC: 110 MG/DL (ref 74–99)
POTASSIUM SERPL-SCNC: 3.3 MMOL/L (ref 3.5–5.5)
POTASSIUM SERPL-SCNC: 3.4 MMOL/L (ref 3.5–5.5)
SODIUM SERPL-SCNC: 127 MMOL/L (ref 136–145)
SODIUM SERPL-SCNC: 128 MMOL/L (ref 136–145)

## 2025-03-19 PROCEDURE — 80048 BASIC METABOLIC PNL TOTAL CA: CPT

## 2025-03-19 PROCEDURE — 36415 COLL VENOUS BLD VENIPUNCTURE: CPT

## 2025-03-19 PROCEDURE — 99232 SBSQ HOSP IP/OBS MODERATE 35: CPT | Performed by: HOSPITALIST

## 2025-03-19 PROCEDURE — 6360000002 HC RX W HCPCS: Performed by: INTERNAL MEDICINE

## 2025-03-19 PROCEDURE — 94640 AIRWAY INHALATION TREATMENT: CPT

## 2025-03-19 PROCEDURE — 6370000000 HC RX 637 (ALT 250 FOR IP): Performed by: SURGERY

## 2025-03-19 PROCEDURE — 94761 N-INVAS EAR/PLS OXIMETRY MLT: CPT

## 2025-03-19 PROCEDURE — 6360000002 HC RX W HCPCS: Performed by: SURGERY

## 2025-03-19 PROCEDURE — 1100000000 HC RM PRIVATE

## 2025-03-19 PROCEDURE — 6370000000 HC RX 637 (ALT 250 FOR IP): Performed by: STUDENT IN AN ORGANIZED HEALTH CARE EDUCATION/TRAINING PROGRAM

## 2025-03-19 PROCEDURE — 6370000000 HC RX 637 (ALT 250 FOR IP): Performed by: INTERNAL MEDICINE

## 2025-03-19 PROCEDURE — 6360000002 HC RX W HCPCS: Performed by: STUDENT IN AN ORGANIZED HEALTH CARE EDUCATION/TRAINING PROGRAM

## 2025-03-19 PROCEDURE — 85730 THROMBOPLASTIN TIME PARTIAL: CPT

## 2025-03-19 RX ORDER — FUROSEMIDE 10 MG/ML
20 INJECTION INTRAMUSCULAR; INTRAVENOUS ONCE
Status: COMPLETED | OUTPATIENT
Start: 2025-03-19 | End: 2025-03-19

## 2025-03-19 RX ADMIN — ARFORMOTEROL TARTRATE 15 MCG: 15 SOLUTION RESPIRATORY (INHALATION) at 09:30

## 2025-03-19 RX ADMIN — FUROSEMIDE 20 MG: 10 INJECTION, SOLUTION INTRAMUSCULAR; INTRAVENOUS at 13:11

## 2025-03-19 RX ADMIN — HEPARIN SODIUM 2100 UNITS: 1000 INJECTION INTRAVENOUS; SUBCUTANEOUS at 06:04

## 2025-03-19 RX ADMIN — Medication 1 G: at 00:51

## 2025-03-19 RX ADMIN — FLUTICASONE PROPIONATE 2 SPRAY: 50 SPRAY, METERED NASAL at 09:15

## 2025-03-19 RX ADMIN — BUDESONIDE 1000 MCG: 0.5 INHALANT RESPIRATORY (INHALATION) at 21:26

## 2025-03-19 RX ADMIN — Medication 1 G: at 15:52

## 2025-03-19 RX ADMIN — HYDROCODONE BITARTRATE AND ACETAMINOPHEN 1 TABLET: 5; 325 TABLET ORAL at 09:09

## 2025-03-19 RX ADMIN — CYCLOBENZAPRINE 10 MG: 10 TABLET, FILM COATED ORAL at 20:18

## 2025-03-19 RX ADMIN — FOLIC ACID 1 MG: 1 TABLET ORAL at 09:09

## 2025-03-19 RX ADMIN — Medication 1 G: at 23:05

## 2025-03-19 RX ADMIN — Medication 1 G: at 09:09

## 2025-03-19 RX ADMIN — HYDROCODONE BITARTRATE AND ACETAMINOPHEN 1 TABLET: 5; 325 TABLET ORAL at 23:05

## 2025-03-19 RX ADMIN — HYDROCODONE BITARTRATE AND ACETAMINOPHEN 1 TABLET: 5; 325 TABLET ORAL at 19:04

## 2025-03-19 RX ADMIN — ARFORMOTEROL TARTRATE 15 MCG: 15 SOLUTION RESPIRATORY (INHALATION) at 21:26

## 2025-03-19 RX ADMIN — HEPARIN SODIUM 4200 UNITS: 1000 INJECTION INTRAVENOUS; SUBCUTANEOUS at 15:48

## 2025-03-19 RX ADMIN — IPRATROPIUM BROMIDE 0.5 MG: 0.5 SOLUTION RESPIRATORY (INHALATION) at 09:30

## 2025-03-19 RX ADMIN — Medication 100 MG: at 09:09

## 2025-03-19 RX ADMIN — HYDRALAZINE HYDROCHLORIDE 10 MG: 20 INJECTION INTRAMUSCULAR; INTRAVENOUS at 23:15

## 2025-03-19 RX ADMIN — HYDROCODONE BITARTRATE AND ACETAMINOPHEN 1 TABLET: 5; 325 TABLET ORAL at 02:25

## 2025-03-19 RX ADMIN — HEPARIN SODIUM 20 UNITS/KG/HR: 10000 INJECTION, SOLUTION INTRAVENOUS at 04:32

## 2025-03-19 RX ADMIN — IPRATROPIUM BROMIDE 0.5 MG: 0.5 SOLUTION RESPIRATORY (INHALATION) at 21:26

## 2025-03-19 RX ADMIN — BUDESONIDE 1000 MCG: 0.5 INHALANT RESPIRATORY (INHALATION) at 09:29

## 2025-03-19 ASSESSMENT — PAIN SCALES - GENERAL
PAINLEVEL_OUTOF10: 0
PAINLEVEL_OUTOF10: 0
PAINLEVEL_OUTOF10: 6
PAINLEVEL_OUTOF10: 6
PAINLEVEL_OUTOF10: 0
PAINLEVEL_OUTOF10: 8
PAINLEVEL_OUTOF10: 4
PAINLEVEL_OUTOF10: 6
PAINLEVEL_OUTOF10: 9

## 2025-03-19 ASSESSMENT — PAIN DESCRIPTION - DESCRIPTORS
DESCRIPTORS: ACHING

## 2025-03-19 ASSESSMENT — PAIN DESCRIPTION - LOCATION
LOCATION: LEG;GROIN
LOCATION: LEG
LOCATION: LEG
LOCATION: GENERALIZED

## 2025-03-19 ASSESSMENT — PAIN DESCRIPTION - DIRECTION: RADIATING_TOWARDS: NO

## 2025-03-19 ASSESSMENT — PAIN DESCRIPTION - ONSET
ONSET: GRADUAL

## 2025-03-19 ASSESSMENT — PAIN DESCRIPTION - PAIN TYPE
TYPE: ACUTE PAIN
TYPE: ACUTE PAIN
TYPE: ACUTE PAIN;CHRONIC PAIN
TYPE: ACUTE PAIN

## 2025-03-19 ASSESSMENT — PAIN DESCRIPTION - FREQUENCY
FREQUENCY: INTERMITTENT

## 2025-03-19 ASSESSMENT — PAIN DESCRIPTION - ORIENTATION
ORIENTATION: RIGHT
ORIENTATION: RIGHT
ORIENTATION: OTHER (COMMENT)
ORIENTATION: RIGHT

## 2025-03-19 ASSESSMENT — PAIN - FUNCTIONAL ASSESSMENT
PAIN_FUNCTIONAL_ASSESSMENT: ACTIVITIES ARE NOT PREVENTED

## 2025-03-19 ASSESSMENT — PAIN SCALES - WONG BAKER
WONGBAKER_NUMERICALRESPONSE: NO HURT

## 2025-03-19 NOTE — PLAN OF CARE
Problem: Pain  Goal: Verbalizes/displays adequate comfort level or baseline comfort level  3/19/2025 0238 by Rosalva Corbin RN  Outcome: Progressing  Note: Pain will be managed and controlled with a pain level of 3 /10 or less. Patient educated on notifying nurse if pain increases above 4.   3/18/2025 1958 by Gita Holman RN  Outcome: Progressing     Problem: Discharge Planning  Goal: Discharge to home or other facility with appropriate resources     Problem: Safety - Adult  Goal: Free from fall injury  3/19/2025 0238 by Rosalva Corbin RN  Outcome: Progressing  Note: No falls knows to call for help  3/18/2025 1958 by Gita Holman RN  Outcome: Progressing     Problem: Nutrition Deficit:  Goal: Optimize nutritional status  Outcome: Progressing  Note: Offer food that is appealing to patient and monitor intake and output     Problem: Respiratory - Adult  Goal: Achieves optimal ventilation and oxygenation  Note: Patient using ICS post-operatively 5-10 per shift or as otherwise directed to reduce possible complications.

## 2025-03-19 NOTE — PLAN OF CARE
Problem: Pain  Goal: Verbalizes/displays adequate comfort level or baseline comfort level  3/19/2025 0732 by Gita Holman RN  Outcome: Progressing  Flowsheets (Taken 3/19/2025 0238 by Rosalva Corbin, RN)  Verbalizes/displays adequate comfort level or baseline comfort level: Encourage patient to monitor pain and request assistance  Note: Will manage and treat pain. Well controlled.   3/19/2025 0238 by Rosalva Corbin RN  Outcome: Progressing  Flowsheets (Taken 3/19/2025 0238)  Verbalizes/displays adequate comfort level or baseline comfort level: Encourage patient to monitor pain and request assistance  Note: Pain will be managed and controlled with a pain level of 3 /10 or less. Patient educated on notifying nurse if pain increases above 4.   3/18/2025 1958 by Gita Holman RN  Outcome: Progressing  Flowsheets (Taken 3/17/2025 2141 by Aguilar Espinal RN)  Verbalizes/displays adequate comfort level or baseline comfort level:   Assess pain using appropriate pain scale   Administer analgesics based on type and severity of pain and evaluate response     Problem: Safety - Adult  Goal: Free from fall injury  3/19/2025 0732 by Gita Holman RN  Outcome: Progressing  Flowsheets (Taken 3/18/2025 1958)  Free From Fall Injury:   Instruct family/caregiver on patient safety   Based on caregiver fall risk screen, instruct family/caregiver to ask for assistance with transferring infant if caregiver noted to have fall risk factors  Note: All safety measures are in place. Will manage closely.   3/19/2025 0238 by Rosalva Corbin RN  Outcome: Progressing  Flowsheets (Taken 3/18/2025 1958 by Gita Holman, RN)  Free From Fall Injury:   Instruct family/caregiver on patient safety   Based on caregiver fall risk screen, instruct family/caregiver to ask for assistance with transferring infant if caregiver noted to have fall risk factors  Note: No falls knows to call for help  3/18/2025 1958 by Gita Holman

## 2025-03-20 ENCOUNTER — ANESTHESIA (OUTPATIENT)
Facility: HOSPITAL | Age: 70
End: 2025-03-20
Payer: COMMERCIAL

## 2025-03-20 LAB
ANION GAP SERPL CALC-SCNC: 7 MMOL/L (ref 3–18)
APTT PPP: 60.7 SEC (ref 23–36.4)
APTT PPP: 63 SEC (ref 23–36.4)
BUN SERPL-MCNC: 10 MG/DL (ref 7–18)
BUN/CREAT SERPL: 21 (ref 12–20)
CALCIUM SERPL-MCNC: 9 MG/DL (ref 8.5–10.1)
CHLORIDE SERPL-SCNC: 96 MMOL/L (ref 100–111)
CO2 SERPL-SCNC: 26 MMOL/L (ref 21–32)
CREAT SERPL-MCNC: 0.48 MG/DL (ref 0.6–1.3)
ERYTHROCYTE [DISTWIDTH] IN BLOOD BY AUTOMATED COUNT: 12.7 % (ref 11.6–14.5)
GLUCOSE SERPL-MCNC: 106 MG/DL (ref 74–99)
HCT VFR BLD AUTO: 36.6 % (ref 36–48)
HGB BLD-MCNC: 12.3 G/DL (ref 13–16)
MCH RBC QN AUTO: 31.9 PG (ref 24–34)
MCHC RBC AUTO-ENTMCNC: 33.6 G/DL (ref 31–37)
MCV RBC AUTO: 95.1 FL (ref 78–100)
NRBC # BLD: 0 K/UL (ref 0–0.01)
NRBC BLD-RTO: 0 PER 100 WBC
PLATELET # BLD AUTO: 352 K/UL (ref 135–420)
PMV BLD AUTO: 9.2 FL (ref 9.2–11.8)
POTASSIUM SERPL-SCNC: 3.4 MMOL/L (ref 3.5–5.5)
POTASSIUM SERPL-SCNC: 3.9 MMOL/L (ref 3.5–5.5)
RBC # BLD AUTO: 3.85 M/UL (ref 4.35–5.65)
SODIUM SERPL-SCNC: 129 MMOL/L (ref 136–145)
WBC # BLD AUTO: 8.5 K/UL (ref 4.6–13.2)

## 2025-03-20 PROCEDURE — 84132 ASSAY OF SERUM POTASSIUM: CPT

## 2025-03-20 PROCEDURE — 6360000002 HC RX W HCPCS: Performed by: SURGERY

## 2025-03-20 PROCEDURE — 85730 THROMBOPLASTIN TIME PARTIAL: CPT

## 2025-03-20 PROCEDURE — 85027 COMPLETE CBC AUTOMATED: CPT

## 2025-03-20 PROCEDURE — 6360000002 HC RX W HCPCS: Performed by: INTERNAL MEDICINE

## 2025-03-20 PROCEDURE — 94640 AIRWAY INHALATION TREATMENT: CPT

## 2025-03-20 PROCEDURE — 84300 ASSAY OF URINE SODIUM: CPT

## 2025-03-20 PROCEDURE — 6370000000 HC RX 637 (ALT 250 FOR IP): Performed by: HOSPITALIST

## 2025-03-20 PROCEDURE — 83935 ASSAY OF URINE OSMOLALITY: CPT

## 2025-03-20 PROCEDURE — 80048 BASIC METABOLIC PNL TOTAL CA: CPT

## 2025-03-20 PROCEDURE — 99232 SBSQ HOSP IP/OBS MODERATE 35: CPT | Performed by: HOSPITALIST

## 2025-03-20 PROCEDURE — 6370000000 HC RX 637 (ALT 250 FOR IP)

## 2025-03-20 PROCEDURE — 82570 ASSAY OF URINE CREATININE: CPT

## 2025-03-20 PROCEDURE — 51798 US URINE CAPACITY MEASURE: CPT

## 2025-03-20 PROCEDURE — 6370000000 HC RX 637 (ALT 250 FOR IP): Performed by: INTERNAL MEDICINE

## 2025-03-20 PROCEDURE — 6360000002 HC RX W HCPCS: Performed by: STUDENT IN AN ORGANIZED HEALTH CARE EDUCATION/TRAINING PROGRAM

## 2025-03-20 PROCEDURE — 1100000000 HC RM PRIVATE

## 2025-03-20 PROCEDURE — 94761 N-INVAS EAR/PLS OXIMETRY MLT: CPT

## 2025-03-20 PROCEDURE — 6370000000 HC RX 637 (ALT 250 FOR IP): Performed by: SURGERY

## 2025-03-20 PROCEDURE — 36415 COLL VENOUS BLD VENIPUNCTURE: CPT

## 2025-03-20 PROCEDURE — 6370000000 HC RX 637 (ALT 250 FOR IP): Performed by: STUDENT IN AN ORGANIZED HEALTH CARE EDUCATION/TRAINING PROGRAM

## 2025-03-20 RX ORDER — CLONIDINE HYDROCHLORIDE 0.1 MG/1
0.1 TABLET ORAL EVERY 6 HOURS PRN
Status: DISCONTINUED | OUTPATIENT
Start: 2025-03-20 | End: 2025-03-23 | Stop reason: HOSPADM

## 2025-03-20 RX ORDER — LIDOCAINE HYDROCHLORIDE 20 MG/ML
JELLY TOPICAL ONCE
Status: COMPLETED | OUTPATIENT
Start: 2025-03-20 | End: 2025-03-20

## 2025-03-20 RX ORDER — LEVOTHYROXINE SODIUM 25 UG/1
25 TABLET ORAL
Status: DISCONTINUED | OUTPATIENT
Start: 2025-03-20 | End: 2025-03-23 | Stop reason: HOSPADM

## 2025-03-20 RX ORDER — POTASSIUM CHLORIDE 1500 MG/1
40 TABLET, EXTENDED RELEASE ORAL 2 TIMES DAILY
Status: COMPLETED | OUTPATIENT
Start: 2025-03-20 | End: 2025-03-20

## 2025-03-20 RX ORDER — FUROSEMIDE 10 MG/ML
20 INJECTION INTRAMUSCULAR; INTRAVENOUS 2 TIMES DAILY
Status: COMPLETED | OUTPATIENT
Start: 2025-03-20 | End: 2025-03-20

## 2025-03-20 RX ADMIN — FUROSEMIDE 20 MG: 10 INJECTION, SOLUTION INTRAMUSCULAR; INTRAVENOUS at 11:44

## 2025-03-20 RX ADMIN — IPRATROPIUM BROMIDE 0.5 MG: 0.5 SOLUTION RESPIRATORY (INHALATION) at 09:20

## 2025-03-20 RX ADMIN — LEVOTHYROXINE SODIUM 25 MCG: 25 TABLET ORAL at 15:02

## 2025-03-20 RX ADMIN — HYDROMORPHONE HYDROCHLORIDE 0.5 MG: 1 INJECTION, SOLUTION INTRAMUSCULAR; INTRAVENOUS; SUBCUTANEOUS at 23:50

## 2025-03-20 RX ADMIN — HYDROCODONE BITARTRATE AND ACETAMINOPHEN 1 TABLET: 5; 325 TABLET ORAL at 21:07

## 2025-03-20 RX ADMIN — HYDROMORPHONE HYDROCHLORIDE 0.5 MG: 1 INJECTION, SOLUTION INTRAMUSCULAR; INTRAVENOUS; SUBCUTANEOUS at 17:55

## 2025-03-20 RX ADMIN — HEPARIN SODIUM 28 UNITS/KG/HR: 10000 INJECTION, SOLUTION INTRAVENOUS at 23:42

## 2025-03-20 RX ADMIN — LIDOCAINE HYDROCHLORIDE: 20 JELLY TOPICAL at 11:02

## 2025-03-20 RX ADMIN — POTASSIUM CHLORIDE 40 MEQ: 1500 TABLET, EXTENDED RELEASE ORAL at 11:44

## 2025-03-20 RX ADMIN — BUDESONIDE 1000 MCG: 0.5 INHALANT RESPIRATORY (INHALATION) at 09:20

## 2025-03-20 RX ADMIN — ARFORMOTEROL TARTRATE 15 MCG: 15 SOLUTION RESPIRATORY (INHALATION) at 09:20

## 2025-03-20 RX ADMIN — HYDROCODONE BITARTRATE AND ACETAMINOPHEN 1 TABLET: 5; 325 TABLET ORAL at 11:43

## 2025-03-20 RX ADMIN — HEPARIN SODIUM 26 UNITS/KG/HR: 10000 INJECTION, SOLUTION INTRAVENOUS at 03:03

## 2025-03-20 RX ADMIN — Medication 100 MG: at 08:50

## 2025-03-20 RX ADMIN — FOLIC ACID 1 MG: 1 TABLET ORAL at 08:49

## 2025-03-20 RX ADMIN — POTASSIUM CHLORIDE 40 MEQ: 1500 TABLET, EXTENDED RELEASE ORAL at 21:07

## 2025-03-20 RX ADMIN — Medication 1 G: at 08:49

## 2025-03-20 RX ADMIN — HYDRALAZINE HYDROCHLORIDE 10 MG: 20 INJECTION INTRAMUSCULAR; INTRAVENOUS at 03:08

## 2025-03-20 RX ADMIN — Medication 1 G: at 18:14

## 2025-03-20 RX ADMIN — FUROSEMIDE 20 MG: 10 INJECTION, SOLUTION INTRAMUSCULAR; INTRAVENOUS at 17:54

## 2025-03-20 ASSESSMENT — PAIN - FUNCTIONAL ASSESSMENT
PAIN_FUNCTIONAL_ASSESSMENT: ACTIVITIES ARE NOT PREVENTED
PAIN_FUNCTIONAL_ASSESSMENT: ACTIVITIES ARE NOT PREVENTED

## 2025-03-20 ASSESSMENT — PAIN SCALES - GENERAL
PAINLEVEL_OUTOF10: 6
PAINLEVEL_OUTOF10: 9
PAINLEVEL_OUTOF10: 9
PAINLEVEL_OUTOF10: 0
PAINLEVEL_OUTOF10: 9
PAINLEVEL_OUTOF10: 0
PAINLEVEL_OUTOF10: 10
PAINLEVEL_OUTOF10: 0
PAINLEVEL_OUTOF10: 6

## 2025-03-20 ASSESSMENT — PAIN DESCRIPTION - DESCRIPTORS
DESCRIPTORS: OTHER (COMMENT)
DESCRIPTORS: SPASM
DESCRIPTORS: THROBBING
DESCRIPTORS: OTHER (COMMENT)
DESCRIPTORS: SPASM;ACHING

## 2025-03-20 ASSESSMENT — PAIN DESCRIPTION - ORIENTATION
ORIENTATION: RIGHT

## 2025-03-20 ASSESSMENT — PAIN DESCRIPTION - FREQUENCY
FREQUENCY: CONTINUOUS
FREQUENCY: INTERMITTENT
FREQUENCY: CONTINUOUS
FREQUENCY: INTERMITTENT

## 2025-03-20 ASSESSMENT — PAIN DESCRIPTION - PAIN TYPE
TYPE: INTRACTABLE PAIN
TYPE: ACUTE PAIN

## 2025-03-20 ASSESSMENT — PAIN DESCRIPTION - LOCATION
LOCATION: LEG

## 2025-03-20 ASSESSMENT — PAIN DESCRIPTION - ONSET
ONSET: ON-GOING
ONSET: GRADUAL

## 2025-03-20 NOTE — CONSULTS
Consult Note    Patient: Dallin Anne               Sex: male          DOA: 3/17/2025       YOB: 1955      Age:  69 y.o.        LOS:  LOS: 3 days              Referring Provider: Holden Angel MD    ASSESSMENT:   1. BPH with urinary retention   18Fr coude catheter placed 3/20/25 with >500cc yellow urine draining      PLAN:    1. 18Fr guzman placed with >500cc drained  2. Recommend starting flomax 0.4mg qPM  3. Reasonable to perform a void trial later this admission after surgery when ambulating and bowel function returned. When doing so, remove at midnight for AM void trial  4. If fails void trial, recommend replacement with a coude catheter and home with a guzman catheter    Nando Joe MD  [phone] Office    No chief complaint on file.      HISTORY OF PRESENT ILLNESS:  Dallin Anne is a 69 y.o. male who is seen in consultation as referred by  for BPH with urinary retention and difficult guzman placement. Nurses attempted it x2 without placement. Patient says he double voids frequently at home, but denies weak stream or straining to urinate. He currently has pain in his right leg, and admitted for right femoral artery occlusion.         3/20/2025    11:43 AM   [facility] CLINCIAL VISIT   Pain Level 6   Pain Location Leg   Pain Orientation Right   Pain Descriptors Spasm   Patient's Stated Pain Goal 0 - No pain   POSS Score 1       Past Medical History:   Diagnosis Date    Ankle fracture, left 10/19/2008    COPD (chronic obstructive pulmonary disease) ([facility])     Cough     DJD (degenerative joint disease) of knee 11/10/2010    ED (erectile dysfunction) 11/10/2010    Fracture of left tibia 10/19/2008    Severe Grade IIIB    GERD (gastroesophageal reflux disease) 11/10/2010    denies    Hearing loss     Left ankle pain     Left ankle sprain     Left ankle strain     Pain with urination     Pneumonia     Sinusitis 11/10/2010    Wears glasses  
Patient seen and examined independently   Agree with the documentation per primary team   A/P:  1) chronic hyponatremia d/t poor solute intake/beer potomania , baseline in high 120s   2) Right femoral artery occlusion - managed by vascular,   3) Alcohol abuse:  4) FTT     Plan:    1) strict I/o  2) salt tabs  3) albumin + Lasix , d/c IVF   4) fluid restrict 1200ml/day   5) urine studies , serum urine osm   6) thyroid functions , cortisol  7) sodium daily    Please call with questions,    Jad Meredith MD FASN  Cell [phone]  Pager: [phone]  Fax   [phone]               Consult Note      Consult requested by: Aldo Noble MD    ADMIT DATE: 3/17/2025  CONSULT DATE: March 18, 2025                 Admission diagnosis: Occlusion of right femoral artery   Reason for Nephrology Consultation: Hyponatremia       Assessment:  Chronic hyponatremia - sodium 126 today which is near his baseline. Likely due to a combination of malnutrition and alcohol abuse, will order workup for further assessment   Right femoral artery occlusion - managed by vascular, will proceed with intervention once sodium is corrected   Alcohol abuse: CIWA protocol in place, cessation discussed     Recommendations:   Salt tabs - 1g TID with 20 mg IV lasix daily   1500 mL fluid restriction  Encourage good PO intake for malnutrition, could consider nutritional supplements or dietary consult   Urine osm, serum osm, cortisol, TSH, urine creat, urine sodium ordered for further workup  [facility] protocol for alcohol withdrawal         HPI: Dallin Anne is a 69 y.o. male White (non-) with PMH of alcohol use and chronic hyponatremia who presented for ischemia of his right foot due to right femoral artery occulusion. He was going for a procedure on 3/17 with vascular but anesthesia recommended delaying the procedure until his sodium is corrected. Nephrology consulted for assistance with sodium management.        Past Medical 
Every Day     Current packs/day: 1.50     Average packs/day: 1.5 packs/day for 30.0 years (45.0 ttl pk-yrs)     Types: Cigarettes     Passive exposure: Never    Smokeless tobacco: Never   Substance Use Topics    Alcohol use: Yes     Alcohol/week: 0.8 - 5.0 standard drinks of alcohol      Current Facility-Administered Medications   Medication Dose Route Frequency Provider Last Rate Last Admin    cyclobenzaprine (FLEXERIL) tablet 10 mg  10 mg Oral TID PRN Aldo Noble MD        fluticasone (FLONASE) 50 MCG/ACT nasal spray 2 spray  2 spray Each Nostril Daily Aldo Noble MD        budesonide (PULMICORT) nebulizer suspension 1,000 mcg  1 mg Nebulization BID RT Aldo Noble MD        And    arformoterol tartrate (BROVANA) nebulizer solution 15 mcg  15 mcg Nebulization BID RT Aldo Noble MD        And    ipratropium (ATROVENT) 0.02 % nebulizer solution 0.5 mg  0.5 mg Nebulization Q6H RT Aldo Noble MD        HYDROcodone-acetaminophen (NORCO) 5-325 MG per tablet 1 tablet  1 tablet Oral Q4H PRN Aldo Noble MD   1 tablet at 03/17/25 1339    ipratropium 0.5 mg-albuterol 2.5 mg (DUONEB) nebulizer solution 1 Dose  1 Dose Inhalation Q4H PRN Aldo Noble MD        hydrALAZINE (APRESOLINE) injection 10 mg  10 mg IntraVENous Once Aldo Noble MD        albuterol (PROVENTIL) (2.5 MG/3ML) 0.083% nebulizer solution 2.5 mg  2.5 mg Nebulization Q6H PRN Clifford Mckinnon MD        0.9 % sodium chloride infusion   IntraVENous Continuous Clifford Mckinnon MD            Allergies   Allergen Reactions    Niacin Rash       Objective:     Vitals:    03/17/25 0945 03/17/25 1430 03/17/25 1539 03/17/25 1815   BP: (!) 161/90 (!) 192/91 (!) 187/67 (!) 155/76   Pulse: 88 91  93   Resp: 18 18  18   Temp: 97.8 °F (36.6 °C) 99.2 °F (37.3 °C)  98.2 °F (36.8 °C)   TempSrc: Oral Oral  Oral   SpO2: 96%      Weight: 52.2 kg (115 lb)      Height: 1.829 m (6' 0.01\")           Physical Exam:  General:  Alert,

## 2025-03-20 NOTE — PLAN OF CARE
Problem: Pain  Goal: Verbalizes/displays adequate comfort level or baseline comfort level  3/20/2025 1124 by Leilani Fischer GN  Outcome: Progressing  Flowsheets (Taken 3/20/2025 1124)  Verbalizes/displays adequate comfort level or baseline comfort level:   Encourage patient to monitor pain and request assistance   Assess pain using appropriate pain scale  Note: Pain will be managed and controlled with a pain level of 0 /10 or less. Patient educated on notifying nurse if pain increases above 0.   3/20/2025 0858 by Emmie Oates RN  Flowsheets (Taken 3/20/2025 0858)  Verbalizes/displays adequate comfort level or baseline comfort level:   Encourage patient to monitor pain and request assistance   Assess pain using appropriate pain scale   Administer analgesics based on type and severity of pain and evaluate response  Note: Assess pain characteristics. Check for pain quality, severity, location, onset, duration, precipitating, and relieving factors.   3/20/2025 0340 by Emmie Oates RN  Outcome: Progressing     Problem: Discharge Planning  Goal: Discharge to home or other facility with appropriate resources  3/20/2025 1124 by Leilani Fischer GN  Outcome: Progressing  Flowsheets  Taken 3/20/2025 1124  Discharge to home or other facility with appropriate resources: Identify barriers to discharge with patient and caregiver  Taken 3/20/2025 0735  Discharge to home or other facility with appropriate resources: Identify barriers to discharge with patient and caregiver  Note: Discharge planning will begin or continue to meet patient goals. Patient will be discharged from [facility] to home or residence of choice in stable condition.   3/20/2025 0340 by Emmie Oates RN  Outcome: Progressing     Problem: Safety - Adult  Goal: Free from fall injury  3/20/2025 1124 by Leilani Fischer GN  Outcome: Progressing  Flowsheets (Taken 3/20/2025 1124)  Free From Fall Injury: Instruct family/caregiver

## 2025-03-20 NOTE — PLAN OF CARE
Problem: Pain  Goal: Verbalizes/displays adequate comfort level or baseline comfort level  Outcome: Progressing     Problem: Discharge Planning  Goal: Discharge to home or other facility with appropriate resources  Outcome: Progressing     Problem: Safety - Adult  Goal: Free from fall injury  Outcome: Progressing     Problem: Nutrition Deficit:  Goal: Optimize nutritional status  Outcome: Progressing     Problem: Respiratory - Adult  Goal: Achieves optimal ventilation and oxygenation  Outcome: Progressing     Problem: Metabolic/Fluid and Electrolytes - Adult  Goal: Electrolytes maintained within normal limits  Outcome: Progressing     Problem: Hematologic - Adult  Goal: Maintains hematologic stability  Outcome: Progressing

## 2025-03-21 ENCOUNTER — APPOINTMENT (OUTPATIENT)
Facility: HOSPITAL | Age: 70
DRG: 270 | End: 2025-03-21
Attending: SURGERY
Payer: COMMERCIAL

## 2025-03-21 LAB
ANION GAP SERPL CALC-SCNC: 6 MMOL/L (ref 3–18)
APTT PPP: 104.1 SEC (ref 23–36.4)
APTT PPP: 41.8 SEC (ref 23–36.4)
BUN SERPL-MCNC: 13 MG/DL (ref 7–18)
BUN/CREAT SERPL: 23 (ref 12–20)
CALCIUM SERPL-MCNC: 9 MG/DL (ref 8.5–10.1)
CHLORIDE SERPL-SCNC: 101 MMOL/L (ref 100–111)
CO2 SERPL-SCNC: 23 MMOL/L (ref 21–32)
CREAT SERPL-MCNC: 0.56 MG/DL (ref 0.6–1.3)
CREAT UR-MCNC: 61 MG/DL (ref 30–125)
ERYTHROCYTE [DISTWIDTH] IN BLOOD BY AUTOMATED COUNT: 12.5 % (ref 11.6–14.5)
GLUCOSE BLD STRIP.AUTO-MCNC: 130 MG/DL (ref 70–110)
GLUCOSE BLD STRIP.AUTO-MCNC: 131 MG/DL (ref 70–110)
GLUCOSE SERPL-MCNC: 117 MG/DL (ref 74–99)
HCT VFR BLD AUTO: 37.3 % (ref 36–48)
HGB BLD-MCNC: 12.6 G/DL (ref 13–16)
MCH RBC QN AUTO: 32 PG (ref 24–34)
MCHC RBC AUTO-ENTMCNC: 33.8 G/DL (ref 31–37)
MCV RBC AUTO: 94.7 FL (ref 78–100)
NRBC # BLD: 0 K/UL (ref 0–0.01)
NRBC BLD-RTO: 0 PER 100 WBC
OSMOLALITY UR: 404 MOSM/KG H2O
PLATELET # BLD AUTO: 351 K/UL (ref 135–420)
PMV BLD AUTO: 9.3 FL (ref 9.2–11.8)
POTASSIUM SERPL-SCNC: 4.3 MMOL/L (ref 3.5–5.5)
RBC # BLD AUTO: 3.94 M/UL (ref 4.35–5.65)
SODIUM SERPL-SCNC: 130 MMOL/L (ref 136–145)
SODIUM UR-SCNC: 79 MMOL/L (ref 20–110)
WBC # BLD AUTO: 11.4 K/UL (ref 4.6–13.2)

## 2025-03-21 PROCEDURE — 6370000000 HC RX 637 (ALT 250 FOR IP): Performed by: HOSPITALIST

## 2025-03-21 PROCEDURE — 6360000002 HC RX W HCPCS: Performed by: STUDENT IN AN ORGANIZED HEALTH CARE EDUCATION/TRAINING PROGRAM

## 2025-03-21 PROCEDURE — C1757 CATH, THROMBECTOMY/EMBOLECT: HCPCS | Performed by: SURGERY

## 2025-03-21 PROCEDURE — 6360000002 HC RX W HCPCS: Performed by: NURSE ANESTHETIST, CERTIFIED REGISTERED

## 2025-03-21 PROCEDURE — 85027 COMPLETE CBC AUTOMATED: CPT

## 2025-03-21 PROCEDURE — 04CK3ZZ EXTIRPATION OF MATTER FROM RIGHT FEMORAL ARTERY, PERCUTANEOUS APPROACH: ICD-10-PCS | Performed by: SURGERY

## 2025-03-21 PROCEDURE — 3700000000 HC ANESTHESIA ATTENDED CARE: Performed by: SURGERY

## 2025-03-21 PROCEDURE — 94640 AIRWAY INHALATION TREATMENT: CPT

## 2025-03-21 PROCEDURE — 6370000000 HC RX 637 (ALT 250 FOR IP): Performed by: INTERNAL MEDICINE

## 2025-03-21 PROCEDURE — 3700000001 HC ADD 15 MINUTES (ANESTHESIA): Performed by: SURGERY

## 2025-03-21 PROCEDURE — 82962 GLUCOSE BLOOD TEST: CPT

## 2025-03-21 PROCEDURE — 6360000002 HC RX W HCPCS: Performed by: INTERNAL MEDICINE

## 2025-03-21 PROCEDURE — 6360000002 HC RX W HCPCS: Performed by: SURGERY

## 2025-03-21 PROCEDURE — 36415 COLL VENOUS BLD VENIPUNCTURE: CPT

## 2025-03-21 PROCEDURE — 6370000000 HC RX 637 (ALT 250 FOR IP): Performed by: SURGERY

## 2025-03-21 PROCEDURE — 2709999900 HC NON-CHARGEABLE SUPPLY: Performed by: SURGERY

## 2025-03-21 PROCEDURE — 2500000003 HC RX 250 WO HCPCS: Performed by: SURGERY

## 2025-03-21 PROCEDURE — C1894 INTRO/SHEATH, NON-LASER: HCPCS | Performed by: SURGERY

## 2025-03-21 PROCEDURE — 99232 SBSQ HOSP IP/OBS MODERATE 35: CPT | Performed by: HOSPITALIST

## 2025-03-21 PROCEDURE — 3600000012 HC SURGERY LEVEL 2 ADDTL 15MIN: Performed by: SURGERY

## 2025-03-21 PROCEDURE — 2500000003 HC RX 250 WO HCPCS: Performed by: NURSE ANESTHETIST, CERTIFIED REGISTERED

## 2025-03-21 PROCEDURE — C1768 GRAFT, VASCULAR: HCPCS | Performed by: SURGERY

## 2025-03-21 PROCEDURE — 80048 BASIC METABOLIC PNL TOTAL CA: CPT

## 2025-03-21 PROCEDURE — 3600000002 HC SURGERY LEVEL 2 BASE: Performed by: SURGERY

## 2025-03-21 PROCEDURE — 6360000004 HC RX CONTRAST MEDICATION: Performed by: SURGERY

## 2025-03-21 PROCEDURE — 04UK0KZ SUPPLEMENT RIGHT FEMORAL ARTERY WITH NONAUTOLOGOUS TISSUE SUBSTITUTE, OPEN APPROACH: ICD-10-PCS | Performed by: SURGERY

## 2025-03-21 PROCEDURE — 2000000000 HC ICU R&B

## 2025-03-21 PROCEDURE — 85730 THROMBOPLASTIN TIME PARTIAL: CPT

## 2025-03-21 PROCEDURE — 2580000003 HC RX 258: Performed by: NURSE ANESTHETIST, CERTIFIED REGISTERED

## 2025-03-21 PROCEDURE — C1769 GUIDE WIRE: HCPCS | Performed by: SURGERY

## 2025-03-21 DEVICE — XENOSURE BIOLOGIC PATCH, 0.8CM X 8CM, EIFU
Type: IMPLANTABLE DEVICE | Site: LEG | Status: FUNCTIONAL
Brand: XENOSURE BIOLOGIC PATCH

## 2025-03-21 RX ORDER — PHENYLEPHRINE HYDROCHLORIDE 10 MG/ML
INJECTION INTRAVENOUS
Status: DISCONTINUED | OUTPATIENT
Start: 2025-03-21 | End: 2025-03-21 | Stop reason: SDUPTHER

## 2025-03-21 RX ORDER — ESMOLOL HYDROCHLORIDE 10 MG/ML
INJECTION INTRAVENOUS
Status: DISCONTINUED | OUTPATIENT
Start: 2025-03-21 | End: 2025-03-21 | Stop reason: SDUPTHER

## 2025-03-21 RX ORDER — IODIXANOL 320 MG/ML
INJECTION, SOLUTION INTRAVASCULAR PRN
Status: DISCONTINUED | OUTPATIENT
Start: 2025-03-21 | End: 2025-03-21 | Stop reason: ALTCHOICE

## 2025-03-21 RX ORDER — HEPARIN SODIUM 200 [USP'U]/100ML
INJECTION, SOLUTION INTRAVENOUS CONTINUOUS PRN
Status: COMPLETED | OUTPATIENT
Start: 2025-03-21 | End: 2025-03-21

## 2025-03-21 RX ORDER — NEOSTIGMINE METHYLSULFATE 1 MG/ML
INJECTION, SOLUTION INTRAVENOUS
Status: DISCONTINUED | OUTPATIENT
Start: 2025-03-21 | End: 2025-03-21 | Stop reason: SDUPTHER

## 2025-03-21 RX ORDER — ROCURONIUM BROMIDE 10 MG/ML
INJECTION, SOLUTION INTRAVENOUS
Status: DISCONTINUED | OUTPATIENT
Start: 2025-03-21 | End: 2025-03-21 | Stop reason: SDUPTHER

## 2025-03-21 RX ORDER — MAGNESIUM HYDROXIDE 1200 MG/15ML
LIQUID ORAL CONTINUOUS PRN
Status: COMPLETED | OUTPATIENT
Start: 2025-03-21 | End: 2025-03-21

## 2025-03-21 RX ORDER — GLYCOPYRROLATE 0.2 MG/ML
INJECTION INTRAMUSCULAR; INTRAVENOUS
Status: DISCONTINUED | OUTPATIENT
Start: 2025-03-21 | End: 2025-03-21 | Stop reason: SDUPTHER

## 2025-03-21 RX ORDER — ASPIRIN 81 MG/1
81 TABLET, CHEWABLE ORAL DAILY
Status: DISCONTINUED | OUTPATIENT
Start: 2025-03-22 | End: 2025-03-23 | Stop reason: HOSPADM

## 2025-03-21 RX ORDER — CEFAZOLIN SODIUM 1 G/3ML
INJECTION, POWDER, FOR SOLUTION INTRAMUSCULAR; INTRAVENOUS
Status: DISCONTINUED | OUTPATIENT
Start: 2025-03-21 | End: 2025-03-21 | Stop reason: SDUPTHER

## 2025-03-21 RX ORDER — HEPARIN SODIUM 10000 [USP'U]/100ML
5-30 INJECTION, SOLUTION INTRAVENOUS CONTINUOUS
Status: DISCONTINUED | OUTPATIENT
Start: 2025-03-21 | End: 2025-03-23

## 2025-03-21 RX ORDER — MIDAZOLAM HYDROCHLORIDE 1 MG/ML
INJECTION, SOLUTION INTRAMUSCULAR; INTRAVENOUS
Status: DISCONTINUED | OUTPATIENT
Start: 2025-03-21 | End: 2025-03-21 | Stop reason: SDUPTHER

## 2025-03-21 RX ORDER — LIDOCAINE HYDROCHLORIDE 20 MG/ML
INJECTION, SOLUTION EPIDURAL; INFILTRATION; INTRACAUDAL; PERINEURAL
Status: DISCONTINUED | OUTPATIENT
Start: 2025-03-21 | End: 2025-03-21 | Stop reason: SDUPTHER

## 2025-03-21 RX ORDER — ONDANSETRON 2 MG/ML
INJECTION INTRAMUSCULAR; INTRAVENOUS
Status: DISCONTINUED | OUTPATIENT
Start: 2025-03-21 | End: 2025-03-21 | Stop reason: SDUPTHER

## 2025-03-21 RX ORDER — FENTANYL CITRATE 50 UG/ML
INJECTION, SOLUTION INTRAMUSCULAR; INTRAVENOUS
Status: DISCONTINUED | OUTPATIENT
Start: 2025-03-21 | End: 2025-03-21 | Stop reason: SDUPTHER

## 2025-03-21 RX ORDER — ASPIRIN 81 MG/1
81 TABLET, CHEWABLE ORAL DAILY
Status: DISCONTINUED | OUTPATIENT
Start: 2025-03-21 | End: 2025-03-21

## 2025-03-21 RX ORDER — PROPOFOL 10 MG/ML
INJECTION, EMULSION INTRAVENOUS
Status: DISCONTINUED | OUTPATIENT
Start: 2025-03-21 | End: 2025-03-21 | Stop reason: SDUPTHER

## 2025-03-21 RX ORDER — SODIUM CHLORIDE 9 MG/ML
INJECTION, SOLUTION INTRAVENOUS
Status: DISCONTINUED | OUTPATIENT
Start: 2025-03-21 | End: 2025-03-21 | Stop reason: SDUPTHER

## 2025-03-21 RX ORDER — HEPARIN SODIUM 1000 [USP'U]/ML
INJECTION, SOLUTION INTRAVENOUS; SUBCUTANEOUS
Status: DISCONTINUED | OUTPATIENT
Start: 2025-03-21 | End: 2025-03-21 | Stop reason: SDUPTHER

## 2025-03-21 RX ADMIN — HEPARIN SODIUM 18 UNITS/KG/HR: 10000 INJECTION, SOLUTION INTRAVENOUS at 11:44

## 2025-03-21 RX ADMIN — HYDROMORPHONE HYDROCHLORIDE 0.5 MG: 1 INJECTION, SOLUTION INTRAMUSCULAR; INTRAVENOUS; SUBCUTANEOUS at 20:06

## 2025-03-21 RX ADMIN — ROCURONIUM BROMIDE 50 MG: 10 INJECTION INTRAVENOUS at 08:33

## 2025-03-21 RX ADMIN — HYDROMORPHONE HYDROCHLORIDE 0.5 MG: 1 INJECTION, SOLUTION INTRAMUSCULAR; INTRAVENOUS; SUBCUTANEOUS at 11:38

## 2025-03-21 RX ADMIN — HYDRALAZINE HYDROCHLORIDE 10 MG: 20 INJECTION INTRAMUSCULAR; INTRAVENOUS at 03:39

## 2025-03-21 RX ADMIN — CEFAZOLIN 2 G: 330 INJECTION, POWDER, FOR SOLUTION INTRAMUSCULAR; INTRAVENOUS at 09:01

## 2025-03-21 RX ADMIN — PROPOFOL 100 MG: 10 INJECTION, EMULSION INTRAVENOUS at 08:33

## 2025-03-21 RX ADMIN — HYDROMORPHONE HYDROCHLORIDE 0.5 MG: 1 INJECTION, SOLUTION INTRAMUSCULAR; INTRAVENOUS; SUBCUTANEOUS at 15:29

## 2025-03-21 RX ADMIN — CLONIDINE HYDROCHLORIDE 0.1 MG: 0.1 TABLET ORAL at 15:30

## 2025-03-21 RX ADMIN — LIDOCAINE HYDROCHLORIDE 40 MG: 20 INJECTION, SOLUTION EPIDURAL; INFILTRATION; INTRACAUDAL; PERINEURAL at 08:33

## 2025-03-21 RX ADMIN — ESMOLOL HYDROCHLORIDE 40 MG: 10 INJECTION, SOLUTION INTRAVENOUS at 08:56

## 2025-03-21 RX ADMIN — Medication 1 G: at 00:29

## 2025-03-21 RX ADMIN — ESMOLOL HYDROCHLORIDE 40 MG: 10 INJECTION, SOLUTION INTRAVENOUS at 09:00

## 2025-03-21 RX ADMIN — SODIUM CHLORIDE: 9 INJECTION, SOLUTION INTRAVENOUS at 08:28

## 2025-03-21 RX ADMIN — FENTANYL CITRATE 100 MCG: 50 INJECTION INTRAMUSCULAR; INTRAVENOUS at 08:28

## 2025-03-21 RX ADMIN — HEPARIN SODIUM 4200 UNITS: 1000 INJECTION INTRAVENOUS; SUBCUTANEOUS at 21:24

## 2025-03-21 RX ADMIN — HYDRALAZINE HYDROCHLORIDE 10 MG: 20 INJECTION INTRAMUSCULAR; INTRAVENOUS at 17:12

## 2025-03-21 RX ADMIN — PHENYLEPHRINE HYDROCHLORIDE 300 MCG: 10 INJECTION INTRAVENOUS at 08:37

## 2025-03-21 RX ADMIN — PHENYLEPHRINE HYDROCHLORIDE 200 MCG: 10 INJECTION INTRAVENOUS at 08:35

## 2025-03-21 RX ADMIN — HEPARIN SODIUM 5000 UNITS: 1000 INJECTION, SOLUTION INTRAVENOUS; SUBCUTANEOUS at 09:19

## 2025-03-21 RX ADMIN — HYDROMORPHONE HYDROCHLORIDE 0.5 MG: 1 INJECTION, SOLUTION INTRAMUSCULAR; INTRAVENOUS; SUBCUTANEOUS at 05:32

## 2025-03-21 RX ADMIN — HYDROCODONE BITARTRATE AND ACETAMINOPHEN 1 TABLET: 5; 325 TABLET ORAL at 21:10

## 2025-03-21 RX ADMIN — ARFORMOTEROL TARTRATE 15 MCG: 15 SOLUTION RESPIRATORY (INHALATION) at 12:30

## 2025-03-21 RX ADMIN — ONDANSETRON 4 MG: 2 INJECTION, SOLUTION INTRAMUSCULAR; INTRAVENOUS at 09:58

## 2025-03-21 RX ADMIN — SUGAMMADEX 200 MG: 100 INJECTION, SOLUTION INTRAVENOUS at 10:24

## 2025-03-21 RX ADMIN — DIAZEPAM 10 MG: 5 INJECTION, SOLUTION INTRAMUSCULAR; INTRAVENOUS at 23:46

## 2025-03-21 RX ADMIN — Medication 3 MG: at 10:04

## 2025-03-21 RX ADMIN — HYDROMORPHONE HYDROCHLORIDE 0.5 MG: 1 INJECTION, SOLUTION INTRAMUSCULAR; INTRAVENOUS; SUBCUTANEOUS at 18:21

## 2025-03-21 RX ADMIN — HYDROMORPHONE HYDROCHLORIDE 0.5 MG: 1 INJECTION, SOLUTION INTRAMUSCULAR; INTRAVENOUS; SUBCUTANEOUS at 22:27

## 2025-03-21 RX ADMIN — PHENYLEPHRINE HYDROCHLORIDE 50 MCG/MIN: 10 INJECTION INTRAVENOUS at 09:48

## 2025-03-21 RX ADMIN — HYDROMORPHONE HYDROCHLORIDE 0.5 MG: 1 INJECTION, SOLUTION INTRAMUSCULAR; INTRAVENOUS; SUBCUTANEOUS at 03:35

## 2025-03-21 RX ADMIN — MIDAZOLAM 2 MG: 1 INJECTION, SOLUTION INTRAMUSCULAR; INTRAVENOUS at 08:28

## 2025-03-21 RX ADMIN — HYDRALAZINE HYDROCHLORIDE 10 MG: 20 INJECTION INTRAMUSCULAR; INTRAVENOUS at 11:47

## 2025-03-21 RX ADMIN — BUDESONIDE 1000 MCG: 0.5 INHALANT RESPIRATORY (INHALATION) at 12:30

## 2025-03-21 RX ADMIN — LEVOTHYROXINE SODIUM 25 MCG: 25 TABLET ORAL at 05:31

## 2025-03-21 RX ADMIN — GLYCOPYRROLATE 0.2 MG: 0.2 INJECTION INTRAMUSCULAR; INTRAVENOUS at 10:04

## 2025-03-21 RX ADMIN — HEPARIN SODIUM 2100 UNITS: 1000 INJECTION INTRAVENOUS; SUBCUTANEOUS at 01:53

## 2025-03-21 RX ADMIN — VASOPRESSIN 4 ML: 20 INJECTION INTRAVENOUS at 08:38

## 2025-03-21 ASSESSMENT — PAIN - FUNCTIONAL ASSESSMENT
PAIN_FUNCTIONAL_ASSESSMENT: 0-10
PAIN_FUNCTIONAL_ASSESSMENT: ACTIVITIES ARE NOT PREVENTED

## 2025-03-21 ASSESSMENT — PAIN SCALES - GENERAL
PAINLEVEL_OUTOF10: 7
PAINLEVEL_OUTOF10: 8
PAINLEVEL_OUTOF10: 5
PAINLEVEL_OUTOF10: 5
PAINLEVEL_OUTOF10: 6
PAINLEVEL_OUTOF10: 7
PAINLEVEL_OUTOF10: 5
PAINLEVEL_OUTOF10: 8
PAINLEVEL_OUTOF10: 7
PAINLEVEL_OUTOF10: 7

## 2025-03-21 ASSESSMENT — PAIN DESCRIPTION - ORIENTATION
ORIENTATION: RIGHT

## 2025-03-21 ASSESSMENT — COPD QUESTIONNAIRES: CAT_SEVERITY: MODERATE

## 2025-03-21 ASSESSMENT — PAIN DESCRIPTION - FREQUENCY
FREQUENCY: CONTINUOUS

## 2025-03-21 ASSESSMENT — PAIN DESCRIPTION - DESCRIPTORS
DESCRIPTORS: THROBBING
DESCRIPTORS: THROBBING
DESCRIPTORS: DULL

## 2025-03-21 ASSESSMENT — PAIN DESCRIPTION - ONSET
ONSET: ON-GOING

## 2025-03-21 ASSESSMENT — PAIN DESCRIPTION - PAIN TYPE
TYPE: ACUTE PAIN

## 2025-03-21 ASSESSMENT — PAIN DESCRIPTION - LOCATION
LOCATION: LEG
LOCATION: ARM;LEG
LOCATION: LEG
LOCATION: LEG;ARM
LOCATION: LEG
LOCATION: LEG

## 2025-03-21 NOTE — ANESTHESIA POSTPROCEDURE EVALUATION
Department of Anesthesiology  Postprocedure Note    Patient: Dallin Anne  MRN: [id number]  YOB: 1955  Date of evaluation: 3/21/2025    Procedure Summary       Date: 03/21/25 Room / Location: [facility] MAIN 07 / [facility] MAIN OR    Anesthesia Start: 0828 Anesthesia Stop: 1038    Procedure: RIGHT FEMORAL ENDARTERECTOMY WITH PATCH ANGIOPLASTY; RIGHT LEG ANGIOGRAM (Right) Diagnosis:       Atherosclerosis of native arteries of extremities with rest pain, right leg      (Atherosclerosis of native arteries of extremities with rest pain, right leg (HCC) [I70.221])    Surgeons: Aldo Noble MD Responsible Provider: Bernard Larson MD    Anesthesia Type: General ASA Status: 3            Anesthesia Type: General    Yanique Phase I: Yanique Score: 9    Yanique Phase II:      Anesthesia Post Evaluation    Patient location during evaluation: bedside  Patient participation: complete - patient participated  Level of consciousness: awake  Airway patency: patent  Nausea & Vomiting: no nausea  Cardiovascular status: hemodynamically stable  Respiratory status: acceptable  Hydration status: euvolemic  Pain management: satisfactory to patient    No notable events documented.

## 2025-03-21 NOTE — OP NOTE
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  Right common femoral artery patch angioplasty    Surgeon(s):  Aldo Noble MD    Assistant:   Surgical Assistant: Nery Holman    Anesthesia: General    Estimated Blood Loss (mL): Minimal    Complications: None    Specimens:   * No specimens in log *    Implants:  Implant Name Type Inv. Item Serial No.  Lot No. LRB No. Used Action   GRAFT VASC W0.8XL8CM THK0.35-0.75MM CAR PERICARD PROC BOV -  Vascular grafts GRAFT VASC W0.8XL8CM THK0.35-0.75MM CAR PERICARD PROC BOV 0000 [facility] [employer]WD [id number] 1 Implanted         Drains:   Urinary Catheter 03/20/25 Ortega (Active)   Catheter Indications Need for fluid volume management of the critically ill patient in a critical care setting 03/20/25 1900   Site Assessment No urethral drainage 03/20/25 1900   Urine Appearance Clear 03/20/25 1900   Collection Container Standard 03/20/25 1944   Securement Method Securing device (Describe) 03/20/25 1944   Catheter Care  Perineal wipes 03/20/25 1944   Catheter Best Practices  Drainage tube clipped to bed;Catheter secured to thigh;Tamper seal intact;Bag below bladder;Bag not on floor;Lack of dependent loop in tubing;Drainage bag less than half full 03/20/25 1944   Status Draining;Patent 03/20/25 1944   Output (mL) 1000 mL 03/20/25 1944       Findings:  Infection Present At Time Of Surgery (PATOS) (choose all levels that have infection present):  No infection present  Other Findings: 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

## 2025-03-21 NOTE — ANESTHESIA PRE PROCEDURE
Department of Anesthesiology  Preprocedure Note       Name:  Dallin Anne   Age:  69 y.o.  :  1955                                          MRN:  [id number]         Date:  3/21/2025      Surgeon: Surgeon(s):  Aldo Noble MD    Procedure: Procedure(s):  RIGHT FEMORAL ENDARTERECTOMY WITH PATCH; RIGHT LEG ANGIOGRAM WITH INTERVENTION, POSSIBLE ILIAC STENT; C-ARM    Medications prior to admission:   Prior to Admission medications    Medication Sig Start Date End Date Taking? Authorizing Provider   fluticasone-umeclidin-vilant (TRELEGY ELLIPTA) 200-62.5-25 MCG/ACT AEPB inhaler inhale 1 puff  BY MOUTH daily 25  Yes Tex Wu MD   cyclobenzaprine (FLEXERIL) 10 MG tablet take 1 tablet by mouth three times a day if needed for muscle spasm 1/3/25  Yes Tex Wu MD   ibuprofen (ADVIL;MOTRIN) 800 MG tablet take 1 tablet by mouth every 8 hours AS NEEDED FOR PAIN 10/28/24  Yes Tex Wu MD   fluticasone (FLONASE) 50 MCG/ACT nasal spray instill 2 sprays into each nostril once daily 10/28/24  Yes Tex Wu MD   albuterol sulfate HFA (PROVENTIL;VENTOLIN;PROAIR) 108 (90 Base) MCG/ACT inhaler inhale 2 puffs by mouth and INTO THE LUNGS every 4 hours if needed for wheezing 4/10/24  Yes Tex Wu MD   lidocaine (LIDODERM) 5 % Place 1 patch onto the skin daily 12 hours on, 12 hours off. 24  Tex Wu MD   ipratropium 0.5 mg-albuterol 2.5 mg (DUONEB) 0.5-2.5 (3) MG/3ML SOLN nebulizer solution inhale contents of 1 vial ( 3 milliliters ) in nebulizer by mouth and INTO THE LUNGS every 4 hours 24   Tex Wu MD       Current medications:    Current Facility-Administered Medications   Medication Dose Route Frequency Provider Last Rate Last Admin    levothyroxine (SYNTHROID) tablet 25 mcg  25 mcg Oral QAM AC Holden Angel MD   25 mcg at 25 0531    HYDROmorphone (DILAUDID) injection 0.5 mg  0.5 mg IntraVENous Q2H PRN Aldo Noble MD   0.5 mg at

## 2025-03-21 NOTE — PLAN OF CARE
Problem: Pain  Goal: Verbalizes/displays adequate comfort level or baseline comfort level  Outcome: Progressing  Flowsheets (Taken 3/21/2025 1100)  Verbalizes/displays adequate comfort level or baseline comfort level:   Assess pain using appropriate pain scale   Encourage patient to monitor pain and request assistance   Administer analgesics based on type and severity of pain and evaluate response   Implement non-pharmacological measures as appropriate and evaluate response     Problem: Discharge Planning  Goal: Discharge to home or other facility with appropriate resources  Outcome: Progressing  Flowsheets (Taken 3/21/2025 1100)  Discharge to home or other facility with appropriate resources:   Identify barriers to discharge with patient and caregiver   Identify discharge learning needs (meds, wound care, etc)   Refer to discharge planning if patient needs post-hospital services based on physician order or complex needs related to functional status, cognitive ability or social support system     Problem: Safety - Adult  Goal: Free from fall injury  Outcome: Progressing  Flowsheets (Taken 3/21/2025 1516)  Free From Fall Injury: Instruct family/caregiver on patient safety  Note: Q1 rounding      Problem: Nutrition Deficit:  Goal: Optimize nutritional status  Outcome: Progressing  Flowsheets (Taken 3/20/2025 1124 by Leilani Fischer GN)  Nutrient intake appropriate for improving, restoring, or maintaining nutritional needs:   Assess nutritional status and recommend course of action   Monitor oral intake, labs, and treatment plans   Recommend appropriate diets, oral nutritional supplements, and vitamin/mineral supplements     Problem: Respiratory - Adult  Goal: Achieves optimal ventilation and oxygenation  Outcome: Progressing  Flowsheets (Taken 3/21/2025 1100)  Achieves optimal ventilation and oxygenation:   Assess for changes in respiratory status   Assess for changes in mentation and behavior   Position to

## 2025-03-22 LAB
ANION GAP SERPL CALC-SCNC: 5 MMOL/L (ref 3–18)
APTT PPP: 50 SEC (ref 23–36.4)
APTT PPP: 75 SEC (ref 23–36.4)
APTT PPP: >180 SEC (ref 23–36.4)
BUN SERPL-MCNC: 11 MG/DL (ref 7–18)
BUN/CREAT SERPL: 23 (ref 12–20)
CALCIUM SERPL-MCNC: 8.1 MG/DL (ref 8.5–10.1)
CHLORIDE SERPL-SCNC: 99 MMOL/L (ref 100–111)
CO2 SERPL-SCNC: 24 MMOL/L (ref 21–32)
CREAT SERPL-MCNC: 0.47 MG/DL (ref 0.6–1.3)
ERYTHROCYTE [DISTWIDTH] IN BLOOD BY AUTOMATED COUNT: 12.9 % (ref 11.6–14.5)
GLUCOSE SERPL-MCNC: 105 MG/DL (ref 74–99)
HCT VFR BLD AUTO: 30.4 % (ref 36–48)
HGB BLD-MCNC: 10.2 G/DL (ref 13–16)
MCH RBC QN AUTO: 32.5 PG (ref 24–34)
MCHC RBC AUTO-ENTMCNC: 33.6 G/DL (ref 31–37)
MCV RBC AUTO: 96.8 FL (ref 78–100)
NRBC # BLD: 0 K/UL (ref 0–0.01)
NRBC BLD-RTO: 0 PER 100 WBC
PLATELET # BLD AUTO: 284 K/UL (ref 135–420)
PMV BLD AUTO: 9.8 FL (ref 9.2–11.8)
POTASSIUM SERPL-SCNC: 3.8 MMOL/L (ref 3.5–5.5)
RBC # BLD AUTO: 3.14 M/UL (ref 4.35–5.65)
SODIUM SERPL-SCNC: 128 MMOL/L (ref 136–145)
WBC # BLD AUTO: 10 K/UL (ref 4.6–13.2)

## 2025-03-22 PROCEDURE — 94761 N-INVAS EAR/PLS OXIMETRY MLT: CPT

## 2025-03-22 PROCEDURE — 85730 THROMBOPLASTIN TIME PARTIAL: CPT

## 2025-03-22 PROCEDURE — 99232 SBSQ HOSP IP/OBS MODERATE 35: CPT | Performed by: HOSPITALIST

## 2025-03-22 PROCEDURE — 6370000000 HC RX 637 (ALT 250 FOR IP): Performed by: HOSPITALIST

## 2025-03-22 PROCEDURE — 6370000000 HC RX 637 (ALT 250 FOR IP): Performed by: SURGERY

## 2025-03-22 PROCEDURE — 80048 BASIC METABOLIC PNL TOTAL CA: CPT

## 2025-03-22 PROCEDURE — 2700000000 HC OXYGEN THERAPY PER DAY

## 2025-03-22 PROCEDURE — 6360000002 HC RX W HCPCS: Performed by: SURGERY

## 2025-03-22 PROCEDURE — 6370000000 HC RX 637 (ALT 250 FOR IP): Performed by: INTERNAL MEDICINE

## 2025-03-22 PROCEDURE — 85027 COMPLETE CBC AUTOMATED: CPT

## 2025-03-22 PROCEDURE — 6370000000 HC RX 637 (ALT 250 FOR IP): Performed by: STUDENT IN AN ORGANIZED HEALTH CARE EDUCATION/TRAINING PROGRAM

## 2025-03-22 PROCEDURE — 2000000000 HC ICU R&B

## 2025-03-22 PROCEDURE — 6360000002 HC RX W HCPCS: Performed by: INTERNAL MEDICINE

## 2025-03-22 RX ORDER — SODIUM CHLORIDE 1 G/1
1 TABLET ORAL
Status: DISCONTINUED | OUTPATIENT
Start: 2025-03-22 | End: 2025-03-23 | Stop reason: HOSPADM

## 2025-03-22 RX ORDER — HYDROMORPHONE HYDROCHLORIDE 1 MG/ML
0.5 INJECTION, SOLUTION INTRAMUSCULAR; INTRAVENOUS; SUBCUTANEOUS
Refills: 0 | Status: DISCONTINUED | OUTPATIENT
Start: 2025-03-22 | End: 2025-03-23 | Stop reason: HOSPADM

## 2025-03-22 RX ADMIN — ASPIRIN 81 MG CHEWABLE TABLET 81 MG: 81 TABLET CHEWABLE at 08:54

## 2025-03-22 RX ADMIN — HYDROCODONE BITARTRATE AND ACETAMINOPHEN 1 TABLET: 5; 325 TABLET ORAL at 03:40

## 2025-03-22 RX ADMIN — HYDROCODONE BITARTRATE AND ACETAMINOPHEN 1 TABLET: 5; 325 TABLET ORAL at 08:05

## 2025-03-22 RX ADMIN — Medication 100 MG: at 08:54

## 2025-03-22 RX ADMIN — DIAZEPAM 5 MG: 5 TABLET ORAL at 20:47

## 2025-03-22 RX ADMIN — HYDRALAZINE HYDROCHLORIDE 10 MG: 20 INJECTION INTRAMUSCULAR; INTRAVENOUS at 22:20

## 2025-03-22 RX ADMIN — Medication 1 G: at 16:20

## 2025-03-22 RX ADMIN — Medication 1 G: at 11:40

## 2025-03-22 RX ADMIN — HEPARIN SODIUM 4200 UNITS: 1000 INJECTION INTRAVENOUS; SUBCUTANEOUS at 11:42

## 2025-03-22 RX ADMIN — HYDROCODONE BITARTRATE AND ACETAMINOPHEN 1 TABLET: 5; 325 TABLET ORAL at 11:50

## 2025-03-22 RX ADMIN — HYDROMORPHONE HYDROCHLORIDE 0.5 MG: 1 INJECTION, SOLUTION INTRAMUSCULAR; INTRAVENOUS; SUBCUTANEOUS at 02:35

## 2025-03-22 RX ADMIN — HYDROCODONE BITARTRATE AND ACETAMINOPHEN 1 TABLET: 5; 325 TABLET ORAL at 16:20

## 2025-03-22 RX ADMIN — HEPARIN SODIUM 26 UNITS/KG/HR: 10000 INJECTION, SOLUTION INTRAVENOUS at 11:42

## 2025-03-22 RX ADMIN — Medication 1 G: at 08:54

## 2025-03-22 RX ADMIN — FLUTICASONE PROPIONATE 2 SPRAY: 50 SPRAY, METERED NASAL at 11:36

## 2025-03-22 RX ADMIN — FOLIC ACID 1 MG: 1 TABLET ORAL at 08:54

## 2025-03-22 RX ADMIN — HYDROMORPHONE HYDROCHLORIDE 0.5 MG: 1 INJECTION, SOLUTION INTRAMUSCULAR; INTRAVENOUS; SUBCUTANEOUS at 19:42

## 2025-03-22 ASSESSMENT — PAIN SCALES - GENERAL
PAINLEVEL_OUTOF10: 7
PAINLEVEL_OUTOF10: 0
PAINLEVEL_OUTOF10: 5
PAINLEVEL_OUTOF10: 7
PAINLEVEL_OUTOF10: 6
PAINLEVEL_OUTOF10: 0
PAINLEVEL_OUTOF10: 6
PAINLEVEL_OUTOF10: 0
PAINLEVEL_OUTOF10: 2
PAINLEVEL_OUTOF10: 3

## 2025-03-22 ASSESSMENT — PAIN - FUNCTIONAL ASSESSMENT
PAIN_FUNCTIONAL_ASSESSMENT: ACTIVITIES ARE NOT PREVENTED

## 2025-03-22 ASSESSMENT — PAIN DESCRIPTION - LOCATION
LOCATION: LEG

## 2025-03-22 ASSESSMENT — PAIN DESCRIPTION - PAIN TYPE
TYPE: SURGICAL PAIN
TYPE: NEUROPATHIC PAIN;SURGICAL PAIN
TYPE: SURGICAL PAIN

## 2025-03-22 ASSESSMENT — PAIN DESCRIPTION - FREQUENCY
FREQUENCY: CONTINUOUS

## 2025-03-22 ASSESSMENT — PAIN DESCRIPTION - DESCRIPTORS
DESCRIPTORS: ACHING
DESCRIPTORS: ACHING
DESCRIPTORS: ACHING;TENDER
DESCRIPTORS: ACHING
DESCRIPTORS: ACHING
DESCRIPTORS: ACHING;THROBBING

## 2025-03-22 ASSESSMENT — PAIN DESCRIPTION - ORIENTATION
ORIENTATION: RIGHT

## 2025-03-22 ASSESSMENT — PAIN DESCRIPTION - ONSET
ONSET: ON-GOING

## 2025-03-23 VITALS
TEMPERATURE: 98.2 F | HEART RATE: 87 BPM | BODY MASS INDEX: 15.83 KG/M2 | HEIGHT: 72 IN | RESPIRATION RATE: 16 BRPM | WEIGHT: 116.84 LBS | DIASTOLIC BLOOD PRESSURE: 79 MMHG | OXYGEN SATURATION: 99 % | SYSTOLIC BLOOD PRESSURE: 182 MMHG

## 2025-03-23 LAB
ANION GAP SERPL CALC-SCNC: 7 MMOL/L (ref 3–18)
APTT PPP: 66.9 SEC (ref 23–36.4)
BUN SERPL-MCNC: 7 MG/DL (ref 7–18)
BUN/CREAT SERPL: 19 (ref 12–20)
CALCIUM SERPL-MCNC: 8.7 MG/DL (ref 8.5–10.1)
CHLORIDE SERPL-SCNC: 96 MMOL/L (ref 100–111)
CO2 SERPL-SCNC: 25 MMOL/L (ref 21–32)
CREAT SERPL-MCNC: 0.36 MG/DL (ref 0.6–1.3)
GLUCOSE BLD STRIP.AUTO-MCNC: 106 MG/DL (ref 70–110)
GLUCOSE SERPL-MCNC: 102 MG/DL (ref 74–99)
POTASSIUM SERPL-SCNC: 3.4 MMOL/L (ref 3.5–5.5)
SODIUM SERPL-SCNC: 128 MMOL/L (ref 136–145)

## 2025-03-23 PROCEDURE — 94761 N-INVAS EAR/PLS OXIMETRY MLT: CPT

## 2025-03-23 PROCEDURE — 6370000000 HC RX 637 (ALT 250 FOR IP): Performed by: STUDENT IN AN ORGANIZED HEALTH CARE EDUCATION/TRAINING PROGRAM

## 2025-03-23 PROCEDURE — 82962 GLUCOSE BLOOD TEST: CPT

## 2025-03-23 PROCEDURE — 85730 THROMBOPLASTIN TIME PARTIAL: CPT

## 2025-03-23 PROCEDURE — 80048 BASIC METABOLIC PNL TOTAL CA: CPT

## 2025-03-23 PROCEDURE — 6370000000 HC RX 637 (ALT 250 FOR IP): Performed by: INTERNAL MEDICINE

## 2025-03-23 PROCEDURE — 6360000002 HC RX W HCPCS: Performed by: SURGERY

## 2025-03-23 PROCEDURE — 2700000000 HC OXYGEN THERAPY PER DAY

## 2025-03-23 PROCEDURE — 99232 SBSQ HOSP IP/OBS MODERATE 35: CPT | Performed by: HOSPITALIST

## 2025-03-23 PROCEDURE — 6370000000 HC RX 637 (ALT 250 FOR IP): Performed by: SURGERY

## 2025-03-23 PROCEDURE — 36415 COLL VENOUS BLD VENIPUNCTURE: CPT

## 2025-03-23 RX ORDER — HYDROCODONE BITARTRATE AND ACETAMINOPHEN 5; 325 MG/1; MG/1
1 TABLET ORAL EVERY 4 HOURS PRN
Qty: 42 TABLET | Refills: 0 | Status: SHIPPED | OUTPATIENT
Start: 2025-03-23 | End: 2025-03-30

## 2025-03-23 RX ORDER — ASPIRIN 81 MG/1
81 TABLET, CHEWABLE ORAL DAILY
Qty: 30 TABLET | Refills: 3 | Status: SHIPPED | OUTPATIENT
Start: 2025-03-24

## 2025-03-23 RX ADMIN — HEPARIN SODIUM 2100 UNITS: 1000 INJECTION INTRAVENOUS; SUBCUTANEOUS at 06:17

## 2025-03-23 RX ADMIN — HEPARIN SODIUM 25 UNITS/KG/HR: 10000 INJECTION, SOLUTION INTRAVENOUS at 10:24

## 2025-03-23 RX ADMIN — CLONIDINE HYDROCHLORIDE 0.1 MG: 0.1 TABLET ORAL at 08:46

## 2025-03-23 RX ADMIN — HYDROCODONE BITARTRATE AND ACETAMINOPHEN 1 TABLET: 5; 325 TABLET ORAL at 01:11

## 2025-03-23 RX ADMIN — FOLIC ACID 1 MG: 1 TABLET ORAL at 08:46

## 2025-03-23 RX ADMIN — Medication 1 G: at 11:48

## 2025-03-23 RX ADMIN — ASPIRIN 81 MG CHEWABLE TABLET 81 MG: 81 TABLET CHEWABLE at 08:45

## 2025-03-23 RX ADMIN — Medication 100 MG: at 08:45

## 2025-03-23 RX ADMIN — Medication 1 G: at 08:45

## 2025-03-23 RX ADMIN — HYDROCODONE BITARTRATE AND ACETAMINOPHEN 1 TABLET: 5; 325 TABLET ORAL at 08:46

## 2025-03-23 RX ADMIN — APIXABAN 10 MG: 5 TABLET, FILM COATED ORAL at 11:48

## 2025-03-23 RX ADMIN — DIAZEPAM 5 MG: 5 TABLET ORAL at 01:11

## 2025-03-23 ASSESSMENT — PAIN SCALES - GENERAL
PAINLEVEL_OUTOF10: 3
PAINLEVEL_OUTOF10: 6
PAINLEVEL_OUTOF10: 6

## 2025-03-23 NOTE — PROGRESS NOTES
Dallin Anne    No chief complaint on file.      History and Physical    Patient awakens easily seems more comfortable today.  Appreciate Dr. Mckinnon and CHRISTOPHERWA protocol and initiation of therapy for hyponatremia.  Patient has ischemic right lower extremity in need of revascularization, on hold secondary to anesthesia's concerns for hyponatremia    Past Medical History:   Diagnosis Date    Ankle fracture, left 10/19/2008    COPD (chronic obstructive pulmonary disease) ([facility])     Cough     DJD (degenerative joint disease) of knee 11/10/2010    ED (erectile dysfunction) 11/10/2010    Fracture of left tibia 10/19/2008    Severe Grade IIIB    GERD (gastroesophageal reflux disease) 11/10/2010    denies    Hearing loss     Left ankle pain     Left ankle sprain     Left ankle strain     Pain with urination     Pneumonia     Sinusitis 11/10/2010    Wears glasses      Patient Active Problem List   Diagnosis    COPD, group D, by GOLD 2017 classification ([facility])    Chronic bronchitis ([facility])    ED (erectile dysfunction)    Rib pain on left side    Personal history of tobacco use    Pneumonia    Mycobacterial infection, non-TB    Sinusitis    DJD (degenerative joint disease) of knee    Fall from standing    GERD (gastroesophageal reflux disease)    Moderate protein-calorie malnutrition    Compression fracture of L5 lumbar vertebra, closed, initial encounter ([facility])    Occlusion of right femoral artery     Past Surgical History:   Procedure Laterality Date    ANESTH,SURGERY OF SHOULDER      11/27/17    ANKLE FRACTURE SURGERY  10/20/2008    IR KYPHOPLASTY THORACIC 1 VERTEBRAL BODY  7/6/2023    IR KYPHOPLASTY THORACIC FIRST LEVEL 7/6/2023 Gonzalo Amaya MD [facility] IR    ORTHOPEDIC SURGERY      Several surgeries in the past which required ORIF of the distal tibia posterior approach and bone grafting x2 separate surgeries     Current Facility-Administered Medications   Medication Dose Route Frequency Provider 
      RENAL PROGRESS NOTE        Dallin Candelario Anne         Assessment/Plan:       1) chronic hyponatremia d/t poor solute intake/beer potomania , baseline in high 120s. Behaving like reset Osmostat. FR, Salt tabs.    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   No SOB/CP/N/V.      Patient Active Problem List   Diagnosis    COPD, group D, by GOLD 2017 classification ([facility])    Chronic bronchitis ([facility])    ED (erectile dysfunction)    Rib pain on left side    Personal history of tobacco use    Pneumonia    Mycobacterial infection, non-TB    Sinusitis    DJD (degenerative joint disease) of knee    Fall from standing    GERD (gastroesophageal reflux disease)    Severe protein-calorie malnutrition    Compression fracture of L5 lumbar vertebra, closed, initial encounter ([facility])    Occlusion of right femoral artery       Current Facility-Administered Medications   Medication Dose Route Frequency Provider Last Rate Last Admin    sodium chloride tablet 1 g  1 g Oral TID  Jad Meredith MD   1 g at 03/22/25 1620    HYDROmorphone HCl PF (DILAUDID) injection 0.5 mg  0.5 mg IntraVENous Q2H PRN Aldo Noble MD        heparin 25,000 units in dextrose 5% 250 mL (premix) infusion  5-30 Units/kg/hr IntraVENous Continuous Aldo Noble MD 13.6 mL/hr at 03/22/25 1500 26 Units/kg/hr at 03/22/25 1500    aspirin chewable tablet 81 mg  81 mg Oral Daily Aldo Noble MD   81 mg at 03/22/25 0854    levothyroxine (SYNTHROID) tablet 25 mcg  25 mcg Oral QAM AC Holden Angel MD   25 mcg at 03/21/25 0531    cloNIDine (CATAPRES) tablet 0.1 mg  0.1 mg Oral Q6H PRN Conrad Morejon MD   0.1 mg at 03/21/25 1530    heparin (porcine) injection 4,200 Units  80 Units/kg IntraVENous PRN Aldo Noble MD 
      RENAL PROGRESS NOTE        Dallin Kodak Anne         Assessment/Plan:     1) chronic hyponatremia d/t poor solute intake/beer potomania , baseline in high 120s. Behaving like reset Osmostat. FR, Salt tabs.    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   No SOB/CP/N/V.      Patient Active Problem List   Diagnosis    COPD, group D, by GOLD 2017 classification ([facility])    Chronic bronchitis ([facility])    ED (erectile dysfunction)    Rib pain on left side    Personal history of tobacco use    Pneumonia    Mycobacterial infection, non-TB    Sinusitis    DJD (degenerative joint disease) of knee    Fall from standing    GERD (gastroesophageal reflux disease)    Severe protein-calorie malnutrition    Compression fracture of L5 lumbar vertebra, closed, initial encounter ([facility])    Occlusion of right femoral artery       Current Facility-Administered Medications   Medication Dose Route Frequency Provider Last Rate Last Admin    apixaban (ELIQUIS) tablet 10 mg  10 mg Oral BID Aldo Noble MD   10 mg at 03/23/25 1148    Followed by    [START ON 3/30/2025] apixaban (ELIQUIS) tablet 5 mg  5 mg Oral BID Aldo Noble MD        sodium chloride tablet 1 g  1 g Oral TID  Jad Meredith MD   1 g at 03/23/25 1148    HYDROmorphone HCl PF (DILAUDID) injection 0.5 mg  0.5 mg IntraVENous Q2H PRN Aldo Noble MD   0.5 mg at 03/22/25 1942    aspirin chewable tablet 81 mg  81 mg Oral Daily Aldo Noble MD   81 mg at 03/23/25 0845    levothyroxine (SYNTHROID) tablet 25 mcg  25 mcg Oral QAM AC Holden Angel MD   25 mcg at 03/21/25 0531    cloNIDine (CATAPRES) tablet 0.1 mg  0.1 mg Oral Q6H PRN Conrad Morejon MD   0.1 mg at 03/23/25 0846    folic acid (FOLVITE) tablet 1 mg  1 mg Oral Daily 
    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  - respiratory - stable  - hemodynamics - stable, no pressrs  - UOP-ok  - Nutrition -ok    Objective:     BP (!) 174/91   Pulse 89   Temp 98.1 °F (36.7 °C) (Oral)   Resp 18   Ht 1.829 m (6')   Wt 53 kg (116 lb 13.5 oz)   SpO2 97%   BMI 15.85 kg/m²       Intake/Output Summary (Last 24 hours) at 3/19/2025 1323  Last data filed at 3/19/2025 1317  Gross per 24 hour   Intake 1911.64 ml   Output 900 ml   Net 1011.64 ml       Physical Exam:     Gen NAD  HENT mmm  RS AEBE   CVS s1s2 wnl no JVD  Ext edema +  Skin no rashes  Neuro oriented X 3     Data Review:    Recent Labs     03/18/25  0928   WBC 9.5   RBC 4.14*   HCT 39.8   MCV 96.1   MCH 33.1   MCHC 34.4   RDW 12.7   MPV 9.3     Recent Labs     03/17/25  1004 03/17/25  2123 03/18/25  0402 03/18/25 2028 03/19/25  0441   BUN 6* 6* 6* 9 7   K 4.4 3.7 3.6 3.3* 3.4*   * 126* 126* 128* 128*   CL 90* 93* 95* 95* 97*   CO2 30 24 21 23 23       Jad Meredith MD  
    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  - respiratory - stable  - hemodynamics - stable, no pressrs  - UOP-ok  - Nutrition -ok    Objective:     BP (!) 174/81   Pulse (!) 105   Temp 98.1 °F (36.7 °C) (Oral)   Resp 16   Ht 1.829 m (6')   Wt 53 kg (116 lb 13.5 oz)   SpO2 99%   BMI 15.85 kg/m²       Intake/Output Summary (Last 24 hours) at 3/20/2025 1451  Last data filed at 3/20/2025 0950  Gross per 24 hour   Intake 1000 ml   Output 975 ml   Net 25 ml       Physical Exam:     Gen NAD  HENT mmm  RS AEBE   CVS s1s2 wnl no JVD  Ext edema +  Skin no rashes  Neuro oriented X 3     Data Review:    Recent Labs     03/20/25  0701   WBC 8.5   RBC 3.85*   HCT 36.6   MCV 95.1   MCH 31.9   MCHC 33.6   RDW 12.7   MPV 9.2     Recent Labs     03/17/25  2123 03/18/25  0402 03/18/25 2028 03/19/25  0441 03/19/25  2134 03/20/25  0701   BUN 6* 6* 9 7 12 10   K 3.7 3.6 3.3* 3.4* 3.3* 3.4*   * 126* 128* 128* 127* 129*   CL 93* 95* 95* 97* 97* 96*   CO2 24 21 23 23 25 26       Jad Meredith MD  
    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  - respiratory - stable  - hemodynamics - stable, no pressrs  - UOP-ok  - Nutrition -ok    Objective:     BP (!) 177/70   Pulse 100   Temp (!) 37 °F (2.8 °C) (Temporal)   Resp 18   Ht 1.829 m (6')   Wt 53 kg (116 lb 13.5 oz)   SpO2 98%   BMI 15.85 kg/m²       Intake/Output Summary (Last 24 hours) at 3/21/2025 1156  Last data filed at 3/21/2025 1016  Gross per 24 hour   Intake 2060 ml   Output 2480 ml   Net -420 ml       Physical Exam:     Gen NAD  HENT mmm  RS AEBE   CVS s1s2 wnl no JVD  Ext edema +  Skin no rashes  Neuro oriented X 3     Data Review:    Recent Labs     03/21/25  0513   WBC 11.4   RBC 3.94*   HCT 37.3   MCV 94.7   MCH 32.0   MCHC 33.8   RDW 12.5   MPV 9.3     Recent Labs     03/18/25 2028 03/19/25  0441 03/19/25  2134 03/20/25  0701 03/20/25  2123 03/21/25  0513   BUN 9 7 12 10  --  13   K 3.3* 3.4* 3.3* 3.4* 3.9 4.3   * 128* 127* 129*  --  130*   CL 95* 97* 97* 96*  --  101   CO2 23 23 25 26  --  23       Jad Meredith MD  
4 Eyes Skin Assessment     NAME:  Dallin Anne  YOB: 1955  MEDICAL RECORD NUMBER:  [id number]    The patient is being assessed for  Shift Handoff    I agree that at least one RN has performed a thorough Head to Toe Skin Assessment on the patient. ALL assessment sites listed below have been assessed.      Areas assessed by both nurses:    Head, Face, Ears, Shoulders, Back, Chest, Arms, Elbows, Hands, Sacrum. Buttock, Coccyx, Ischium, and Legs. Feet and Heels        Does the Patient have a Wound? No noted wound(s)       Wesly Prevention initiated by RN: Yes  Wound Care Orders initiated by RN: No    Pressure Injury (Stage 3,4, Unstageable, DTI, NWPT, and Complex wounds) if present, place Wound referral order by RN under : No    New Ostomies, if present place, Ostomy referral order under : No     Nurse 1 eSignature: Electronically signed by Emmie Hernandez RN on 3/20/25 at 4:11 AM EDT    **SHARE this note so that the co-signing nurse can place an eSignature**    Nurse 2 eSignature: Electronically signed by DIONY Marvin on 3/20/25 at 5:03 PM EDT   
Bladder scan done on patient. Bladder scan >720ml. I attempted straight catheter but was unsuccessful.       Spoke with Dr Kirkland he informed me he would reach out to urology.  
CBC and BMP was collected at 0701 and taken to lab by emmy and timed stamped into lab   
Comprehensive Nutrition Assessment    Type and Reason for Visit:  Initial, Consult    Nutrition Recommendations/Plan:   Continue current diet as tolerated. Alcohol/beer with meals per MD request.   Order Ensure Plus High Protein (each provides 350 kcal, 20g protein) TID  Plan to add folic acid r/t alcohol abuse, continue thiamine supplementation. Unable to add MVI r/t niacin allergy.   Daily wts.   Continue to monitor tolerance of PO, compliance of oral supplements, weight, labs, and plan of care during admission.     Malnutrition Assessment:  Malnutrition Status:  Severe malnutrition (03/18/25 1436)    Context:  Chronic Illness     Findings of the 6 clinical characteristics of malnutrition:  Energy Intake:  75% or less estimated energy requirements for 1 month or longer (poor intake at baseline per Wife's description)  Weight Loss:  No weight loss     Body Fat Loss:  Severe body fat loss Buccal region, Triceps   Muscle Mass Loss:  Severe muscle mass loss Temples (temporalis), Hand (interosseous), Clavicles (pectoralis & deltoids)  Fluid Accumulation:  No fluid accumulation     Strength:  Not Performed    Nutrition History and Allergies:      Past Medical History:   Diagnosis Date    Ankle fracture, left 10/19/2008    COPD (chronic obstructive pulmonary disease) ([facility])     Cough     DJD (degenerative joint disease) of knee 11/10/2010    ED (erectile dysfunction) 11/10/2010    Fracture of left tibia 10/19/2008    Severe Grade IIIB    GERD (gastroesophageal reflux disease) 11/10/2010    denies    Hearing loss     Left ankle pain     Left ankle sprain     Left ankle strain     Pain with urination     Pneumonia     Sinusitis 11/10/2010    Wears glasses    PMHx of severe alcohol abuse    Pt presents with persistent hyponatremia and ischemia of the right foot.     Weight Hx: per EMR review, wt change: +6.9 lb (6.3%) x 2 years PTA - not significant.   Wt Readings from Last 10 Encounters:   03/18/25 53 kg (116 lb 13.5 
Comprehensive Nutrition Assessment    Type and Reason for Visit:  Reassess, Consult    Nutrition Recommendations/Plan:   As diet advances, resume regular diet as tolerated with beer at meals to prevent withdrawal symptoms   Order Ensure Plus High Protein (each provides 350 kcal, 20g protein) TID  Continue Folic Acid and Thiamine supplementation 2/2 to ETOH abuse  Continue to monitor tolerance of PO, compliance of oral supplements, weight, labs, and plan of care during admission.     Malnutrition Assessment:  Malnutrition Status:  Severe malnutrition (03/18/25 1436)    Context:  Chronic Illness     Findings of the 6 clinical characteristics of malnutrition:  Energy Intake:  75% or less estimated energy requirements for 1 month or longer (poor intake at baseline per Wife's description)  Weight Loss:  No weight loss     Body Fat Loss:  Severe body fat loss Buccal region, Triceps   Muscle Mass Loss:  Severe muscle mass loss Temples (temporalis), Hand (interosseous), Clavicles (pectoralis & deltoids)  Fluid Accumulation:  No fluid accumulation     Strength:  Not Performed    Nutrition Assessment:    Admitted for occlusion of right femoral artery. Pt seen for low BMI (15.9) and consult - general nutrition management, comments: \"malnutrition with EtOH abuse; please serve patient beer or alcohol every shift to avoid withdrawal symptoms.\" [facility] MD regarding need to contact patient  with request for pt to receive alcohol. Extension provided. MD still asking RD to speak wit pt, stating \"he drinks instead of eats.\" Attempted to visit pt x3- asleep each time, very limited visual NFPE 2/2 pt under covers. Called pt's wife over phone- noted usual wt of 115 lb. Wife described pt's efforts to gain wt, ex. Drinking supplements, protein shakes. Described usual intake of 3 meals per day; note breakfast and lunch meals are very small at baseline, ex. Blueberry muffin for breakfast, 1/2 sandwich or single 
Dr. Meadows was [facility] for bladder scan reading >713. Order to straight cath x 1 was received. Patient was straight cath using 15 Fr. No urine output was noted. No abdominal distention was present. Pt denied any abdomen discomfort. RYANN Odell was notified of findings.   
Have had no luck on a confirmed hospital pathway to provide etoh while in patient to prevent DT / withdrawal. I feel he is at high risk for this.  I will ask pt's family to provide to him. will add to dietary orders as well. 3 beers per day  
NEB PROTOCOL:  ASSESSMENT    Patient  Dallin Anne     69 y.o.   male     3/20/2025  9:46 AM    Breath Sounds Pre Procedure:    BBS clear                                           Breath Sounds Post Procedure:   BBS clear                                              Breathing pattern: Pre procedure   normal          Post procedure   normal    Cough: Pre procedure   none               Post procedure  none    Heart Rate: Pre procedure  96           Post procedure  95    Resp Rate: Pre procedure  18            Post procedure   18       Nebulizer Therapy: Current medications         Problem List:   Patient Active Problem List   Diagnosis    COPD, group D, by GOLD 2017 classification ([facility])    Chronic bronchitis ([facility])    ED (erectile dysfunction)    Rib pain on left side    Personal history of tobacco use    Pneumonia    Mycobacterial infection, non-TB    Sinusitis    DJD (degenerative joint disease) of knee    Fall from standing    GERD (gastroesophageal reflux disease)    Severe protein-calorie malnutrition    Compression fracture of L5 lumbar vertebra, closed, initial encounter ([facility])    Occlusion of right femoral artery       Patient alert and cooperative to use MDI: yes    Home Respiratory Therapy Regimen/Frequency:  no  Medication   Device  Frequency     SEVERITY INDEX:    ITEM 0 1 2 3 4 Score   Respiratory Pattern and or Rate Regular  10-19 Regular  20-24   24-30    30-34 Severe SOB or   Greater than 35 0   Breath Sounds Clear Occasional Wheeze Mild Wheezing Moderate Wheezing  wheezing/Absent breath sounds 0   Shortness of Breath None Dyspnea on Exertion Dyspnea at Rest Moderate Shortness of Breath at Rest Severe Shortness of Breath - Limited Speech 0       Total Score:  0    * Scoring Guidelines  0-4 pts:  PRN-BID   5-7 pts:  BID, TID, QID  8-9 pts:  TID, QID, Q6  10-12 pts:  Q4-Q6  * - Guidelines used with clinical judgement.  PRN Treatments can be ordered to supplement scheduled treatments.  
NEB PROTOCOL:  ASSESSMENT    Patient  Dallin Anne     69 y.o.   male     3/22/2025  8:20 AM    Breath Sounds Pre Procedure:                                               Breath Sounds Post Procedure:                                                 Breathing pattern: Pre procedure             Post procedure       Cough: Pre procedure                  Post procedure      Heart Rate: Pre procedure             Post procedure      Resp Rate: Pre procedure              Post procedure          Nebulizer Therapy: Current medications         Problem List:   Patient Active Problem List   Diagnosis    COPD, group D, by GOLD 2017 classification ([facility])    Chronic bronchitis ([facility])    ED (erectile dysfunction)    Rib pain on left side    Personal history of tobacco use    Pneumonia    Mycobacterial infection, non-TB    Sinusitis    DJD (degenerative joint disease) of knee    Fall from standing    GERD (gastroesophageal reflux disease)    Severe protein-calorie malnutrition    Compression fracture of L5 lumbar vertebra, closed, initial encounter ([facility])    Occlusion of right femoral artery       Patient alert and cooperative to use MDI: Yes    Home Respiratory Therapy Regimen/Frequency:  No (has orders, but pt states he never uses them)   Medication   Device  Frequency     SEVERITY INDEX:    ITEM 0 1 2 3 4 Score   Respiratory Pattern and or Rate Regular  10-19 Regular  20-24   24-30    30-34 Severe SOB or   Greater than 35 0   Breath Sounds Clear Occasional Wheeze Mild Wheezing Moderate Wheezing  wheezing/Absent breath sounds 0   Shortness of Breath None Dyspnea on Exertion Dyspnea at Rest Moderate Shortness of Breath at Rest Severe Shortness of Breath - Limited Speech 0       Total Score:  0    * Scoring Guidelines  0-4 pts:  PRN-BID   5-7 pts:  BID, TID, QID  8-9 pts:  TID, QID, Q6  10-12 pts:  Q4-Q6  * - Guidelines used with clinical judgement.  PRN Treatments can be ordered to supplement scheduled treatments.  
Patient refused 1200 neb treatment. Patient stated he will take the next one or will call if needed before then.   
Patient sitting up in bed trying to urinate  Bladder scan that showed a full bladder to 700 but I am unable to produce much urine.  Straight cath was attempted with no output.  Vital signs are stable  Sodium 129 today  Patient remains essentially the same  Head is normocephalic appears old for age  Chest is clear  Cardiac is regular  Abdomen soft  Right lower extremity is very ischemic his femoral pulses palpable    Had a lengthy discussion with his care team and anesthesia.  Complications are expected with a low sodium and surgery.  Anesthesia is requesting a hard deck of 130 but would prefer to be in the 130s.  Discussed this with the patient is quite frustrated but understands.  His limb threatened is from a lifetime of tobacco use.  The low sodium is from excessive beer intake.  He is very malnutrition and very poor health.    Will ask urology to assist with Ortega placement, he will need for surgery.  Appreciate medical nephrology's efforts regarding hyponatremia  Was able to make arrangements for OR room, anesthesia, and staff tomorrow at 9 AM pending improved sodium  
Progress Note:    PTT put in stat, no level noted during the morning shift. Patient alert and in no distress, no active bleeding noted.  6:56 AM  Pre op report given Heparin drip increased to 28 units per  kilo with bolus given Level pending with pre op aware Patient transported on 2 lpm by nasal cannula. Told that patient will not be returning to floor postop so all belongings sent with patient to pre- op.    
Pt very anxious. Would like to leave. Scoring highon the ciwa scale. Pt stated he was leaving if the surgeon did not come to see hm. Jocelin BAILEY and he is not here and can not come until tomorrow. Administered PRN and pt is resting quietly.   
Review of Systems    Physical Exam  Skin:           King YVONNE and Tasha RUSSELL  
Saw patient at bedside, alert and comfortable  Vital signs are stable  Sodium up to 128 this morning  Appreciate medical and nephrology evaluation  Planning for surgery tomorrow for right femoral endarterectomy  Discussed with anesthesia upcoming surgery  
Spiritual Health History and Assessment/Progress Note  [facility]    Advance Care Planning, Spiritual/Emotional Needs,  ,  ,      Name: Dallin Anne MRN: [id number]    Age: 69 y.o.     Sex: male   Language: English   Restoration: Scientologist   Occlusion of right femoral artery     Date: 3/18/2025            Total Time Calculated: 7 min              Spiritual Assessment began in [facility] SURGICAL        Referral/Consult From: Multi-disciplinary team   Encounter Overview/Reason: Advance Care Planning, Spiritual/Emotional Needs  Service Provided For: Patient    Winter, Belief, Meaning:   Patient has beliefs or practices that help with coping during difficult times  Family/Friends No family/friends present      Importance and Influence:  Patient has spiritual/personal beliefs that influence decisions regarding their health  Family/Friends No family/friends present    Community:  Patient feels well-supported. Support system includes: Spouse/Partner and Children  Family/Friends No family/friends present    Assessment and Plan of Care:     Patient Interventions include: Facilitated expression of thoughts and feelings  Family/Friends Interventions include: No family/friends present    Patient Plan of Care: No spiritual needs identified for follow-up  Family/Friends Plan of Care: No family/friends present    Electronically signed by GEMINI Ocampo on 3/18/2025 at 2:53 PM    
Tolerated right femoral endarterectomy without complication  Prevena intact  Will need further endovascular cases requiring anesthesia  Na 128 today  Ischemic appearance is improved  Both feet have ischemic appearance  Suspected distal SFA / popliteal disease on the right.  
To[facility]ito Edwards [facility] Hospitalist Group  Progress Note    Patient: Dallin Anne Age: 69 y.o. : 1955 MR#: [id number] SSN: [id number]  Date/Time: 3/21/2025     Subjective:     Patient seen evaluated, lying in bed, no acute distress.    69-year-old male admitted by vascular surgery for right femoral artery occlusion.  Patient is currently on heparin gtt., hospitalist medicine consulted secondary to chronic hyponatremia.  Patient sodium levels this morning still remain at 126, nephrology consulted.    3/19-patient seen evaluated, lying in bed, no acute distress.  Sodium levels have improved to 128 this morning.  Patient scheduled for vascular surgery in AM.  Will continue to follow.    3/20-patient seen and evaluated, lying in bed, no acute distress, sodium levels 129, surgery postponed till tomorrow, hopefully sodium levels greater than 130.  Patient also having urinary retention but difficult to place Ortega, urology consulted for Ortega placement.    3/21 -patient seen and evaluated, lying in bed, no acute distress.  Patient underwent right common femoral artery thrombectomy and endarterectomy with right common femoral artery patch angioplasty.  Patient tolerated procedure well.  Will continue to follow    Assessment/Plan:     Right femoral artery occlusion, appreciate vascular surgery input, continue heparin GTT, further management per vascular surgery.Patient underwent right common femoral artery thrombectomy and endarterectomy with right common femoral artery patch angioplasty. Patient tolerated procedure well.  Chronic hyponatremia in the setting of alcohol abuse, sodium levels improved to 130 this morning  Urinary retention, urology consulted for Ortega catheter placement since nurses were unable to do so  Subclinical hypothyroidism-add low-dose Synthroid.  Chronic alcohol use-patient has been counseled to stop.  Chronic tobacco use-patient has been counseled to stop.  DVT 
To[facility]ito Edwards [facility] Hospitalist Group  Progress Note    Patient: Dallin Anne Age: 69 y.o. : 1955 MR#: [id number] SSN: [id number]  Date/Time: 3/22/2025     Subjective:     Patient seen evaluated, lying in bed, no acute distress.    69-year-old male admitted by vascular surgery for right femoral artery occlusion.  Patient is currently on heparin gtt., hospitalist medicine consulted secondary to chronic hyponatremia.  Patient sodium levels this morning still remain at 126, nephrology consulted.    3/19-patient seen evaluated, lying in bed, no acute distress.  Sodium levels have improved to 128 this morning.  Patient scheduled for vascular surgery in AM.  Will continue to follow.    3/20-patient seen and evaluated, lying in bed, no acute distress, sodium levels 129, surgery postponed till tomorrow, hopefully sodium levels greater than 130.  Patient also having urinary retention but difficult to place Ortega, urology consulted for Ortega placement.    3/21 -patient seen and evaluated, lying in bed, no acute distress.  Patient underwent right common femoral artery thrombectomy and endarterectomy with right common femoral artery patch angioplasty.  Patient tolerated procedure well.  Will continue to follow    3/22-patient seen and evaluated, lying in bed, no acute distress.  Patient is sleepy.  Vascular surgery note reviewed, patient tolerated right femoral endarterectomy without complication.  Ischemic appearance is improved.  Will need further endovascular cases requiring anesthesia, sodium 128, nephrology on board for hyponatremia.    Assessment/Plan:     Right femoral artery occlusion, appreciate vascular surgery input, continue heparin GTT, further management per vascular surgery.Patient underwent right common femoral artery thrombectomy and endarterectomy with right common femoral artery patch angioplasty. Patient tolerated procedure well.  Chronic hyponatremia in the setting of 
To[facility]ito Edwards [facility] Hospitalist Group  Progress Note    Patient: Dallin Anne Age: 69 y.o. : 1955 MR#: [id number] SSN: [id number]  Date/Time: 3/18/2025     Subjective:     Patient seen evaluated, lying in bed, no acute distress.    69-year-old male admitted by vascular surgery for right femoral artery occlusion.  Patient is currently on heparin gtt., hospitalist medicine consulted secondary to chronic hyponatremia.  Patient sodium levels this morning still remain at 126, nephrology consulted.      Assessment/Plan:     Right femoral artery occlusion, appreciate vascular surgery input, continue heparin GTT, further management per vascular surgery.  Chronic hyponatremia in the setting of alcohol abuse, sodium levels remain 126, nephrology consulted.  Chronic alcohol use-patient has been counseled to stop.  Chronic tobacco use-patient has been counseled to stop.  DVT prophylaxis-heparin GTT  Full code            Anticipated Discharge and Disposition:    TAMMY Angel MD  25      Case discussed with:  [x]Patient  []Family  []Nursing  []Case Management  DVT Prophylaxis:  []Lovenox  []Hep SQ  []SCDs  []Coumadin   [x]On Heparin gtt    Objective:   VS:   Vitals:    25 1147   BP: (!) 178/87   Pulse: 95   Resp: 18   Temp: 97.5 °F (36.4 °C)   SpO2: 94%        Intake/Output Summary (Last 24 hours) at 3/18/2025 1252  Last data filed at 3/18/2025 0825  Gross per 24 hour   Intake 640 ml   Output 200 ml   Net 440 ml       General:  Alert, cooperative, no acute distress    Pulmonary:  CTA Bilaterally. No Wheezing/Rhonchi/Rales.  Cardiovascular: Regular rate and Rhythm.  GI:  Soft, Non distended, Non tender. + Bowel sounds.  Extremities: Right leg cool and mottled secondary to occlusion  Neurologic: Alert and oriented X 3. No acute neuro deficits.  Additional:    Medications:   Current Facility-Administered Medications   Medication Dose Route Frequency    budesonide 
To[facility]ito Edwards [facility] Hospitalist Group  Progress Note    Patient: Dallin Anne Age: 69 y.o. : 1955 MR#: [id number] SSN: [id number]  Date/Time: 3/23/2025     Subjective:     Patient seen evaluated, lying in bed, no acute distress.    69-year-old male admitted by vascular surgery for right femoral artery occlusion.  Patient is currently on heparin gtt., hospitalist medicine consulted secondary to chronic hyponatremia.  Patient sodium levels this morning still remain at 126, nephrology consulted.    3/19-patient seen evaluated, lying in bed, no acute distress.  Sodium levels have improved to 128 this morning.  Patient scheduled for vascular surgery in AM.  Will continue to follow.    3/20-patient seen and evaluated, lying in bed, no acute distress, sodium levels 129, surgery postponed till tomorrow, hopefully sodium levels greater than 130.  Patient also having urinary retention but difficult to place Ortega, urology consulted for Ortega placement.    3/21 -patient seen and evaluated, lying in bed, no acute distress.  Patient underwent right common femoral artery thrombectomy and endarterectomy with right common femoral artery patch angioplasty.  Patient tolerated procedure well.  Will continue to follow    3/22-patient seen and evaluated, lying in bed, no acute distress.  Patient is sleepy.  Vascular surgery note reviewed, patient tolerated right femoral endarterectomy without complication.  Ischemic appearance is improved.  Will need further endovascular cases requiring anesthesia, sodium 128, nephrology on board for hyponatremia.    3/22-patient seen evaluated, lying in bed, no acute distress.  Patient mentions that he would like to go home, he will follow-up with vascular surgery regarding further testing and workup, he does not wish to stay at this time.  Vascular surgery has discharged the patient.    Assessment/Plan:     Right femoral artery occlusion, appreciate vascular 
Toñito Edwards [facility] Hospitalist Group  Progress Note    Patient: Dallin Anne Age: 69 y.o. : 1955 MR#: [id number] SSN: [id number]  Date/Time: 3/19/2025     Subjective:     Patient seen evaluated, lying in bed, no acute distress.    69-year-old male admitted by vascular surgery for right femoral artery occlusion.  Patient is currently on heparin gtt., hospitalist medicine consulted secondary to chronic hyponatremia.  Patient sodium levels this morning still remain at 126, nephrology consulted.    3/19-patient seen evaluated, lying in bed, no acute distress.  Sodium levels have improved to 128 this morning.  Patient scheduled for vascular surgery in AM.  Will continue to follow.    Assessment/Plan:     Right femoral artery occlusion, appreciate vascular surgery input, continue heparin GTT, further management per vascular surgery.  Chronic hyponatremia in the setting of alcohol abuse, sodium levels improved to 128 this morning.  Chronic alcohol use-patient has been counseled to stop.  Chronic tobacco use-patient has been counseled to stop.  DVT prophylaxis-heparin GTT  Full code            Anticipated Discharge and Disposition:    TAMMY Angel MD  25      Case discussed with:  [x]Patient  []Family  []Nursing  []Case Management  DVT Prophylaxis:  []Lovenox  []Hep SQ  []SCDs  []Coumadin   [x]On Heparin gtt    Objective:   VS:   Vitals:    25 0909   BP: (!) 166/73   Pulse: 86   Resp: 18   Temp: 97.7 °F (36.5 °C)   SpO2:         Intake/Output Summary (Last 24 hours) at 3/19/2025 1130  Last data filed at 3/18/2025 2301  Gross per 24 hour   Intake 1791.64 ml   Output 900 ml   Net 891.64 ml       General:  Alert, cooperative, no acute distress    Pulmonary:  CTA Bilaterally. No Wheezing/Rhonchi/Rales.  Cardiovascular: Regular rate and Rhythm.  GI:  Soft, Non distended, Non tender. + Bowel sounds.  Extremities: Right leg cool and mottled secondary to 
Toñito Edwards [facility] Hospitalist Group  Progress Note    Patient: Dallin Anne Age: 69 y.o. : 1955 MR#: [id number] SSN: [id number]  Date/Time: 3/20/2025     Subjective:     Patient seen evaluated, lying in bed, no acute distress.    69-year-old male admitted by vascular surgery for right femoral artery occlusion.  Patient is currently on heparin gtt., hospitalist medicine consulted secondary to chronic hyponatremia.  Patient sodium levels this morning still remain at 126, nephrology consulted.    3/19-patient seen evaluated, lying in bed, no acute distress.  Sodium levels have improved to 128 this morning.  Patient scheduled for vascular surgery in AM.  Will continue to follow.    3/20-patient seen and evaluated, lying in bed, no acute distress, sodium levels 129, surgery postponed till tomorrow, hopefully sodium levels greater than 130.  Patient also having urinary retention but difficult to place Ortega, urology consulted for Ortega placement.    Assessment/Plan:     Right femoral artery occlusion, appreciate vascular surgery input, continue heparin GTT, further management per vascular surgery.  Chronic hyponatremia in the setting of alcohol abuse, sodium levels improved to 129 this morning.  Anesthesia would like sodium levels greater than 130 prior to surgery.  Urinary retention, urology consulted for Ortega catheter placement since nurses were unable to do so  Subclinical hypothyroidism-add low-dose Synthroid.  Chronic alcohol use-patient has been counseled to stop.  Chronic tobacco use-patient has been counseled to stop.  DVT prophylaxis-heparin GTT  Full code            Anticipated Discharge and Disposition:    TAMMY Angel MD  25      Case discussed with:  [x]Patient  []Family  []Nursing  []Case Management  DVT Prophylaxis:  []Lovenox  []Hep SQ  []SCDs  []Coumadin   [x]On Heparin gtt    Objective:   VS:   Vitals:    25 1143   BP: (!) 179/80   Pulse:  
determine / Unknown  -- Refer to Clinical Documentation Reviewer    PROVIDER RESPONSE TEXT:    This patient has severe protein calorie malnutrition.    Query created by: Alba Robins on 3/19/2025 6:18 AM      Electronically signed by:  Holden Angel MD 3/19/2025 7:43 AM

## 2025-03-23 NOTE — DISCHARGE SUMMARY
Physician Discharge Summary     Patient ID:  Dallin Anne  [id number]  69 y.o.  1955    Admit date: 3/17/2025    Discharge date: 3/23/2025      Admitting Physician: Aldo Noble MD     Discharge Physician: Aldo Noble MD     Admission Diagnoses: Atherosclerosis of native arteries of extremities with rest pain, right leg [I70.221]  Occlusion of right femoral artery [I70.201]    Discharge Diagnoses: Same    Procedures for this admission: Procedure(s):  RIGHT FEMORAL ENDARTERECTOMY WITH PATCH ANGIOPLASTY; RIGHT LEG ANGIOGRAM    Discharged Condition: stable    Hospital Course: Patient was admitted to the hospital by me for correction of hyponatremia.  Had been previously seen in the ER by hospital but did not meet criteria for admission.  Correction of hyponatremia essential for anesthesia clearance.  Patient has history of chronic beer intake and tobacco use with chronic hyponatremia and critical PAD.  He has very ischemic rest pain of his right leg secondary to a complete occlusion of the right common femoral artery and underwent finally right femoral endarterectomy on 3/21/2025 without complication.  His sodium corrected at least to 130 that day.  Postoperatively his ischemic pain resolved and has been asking for discharge.  His wife has been home with a cold but she feels better today.  His ischemia had been present all the way to the knee now he just has bluish feet but actually look much better today.  He has been on heparin throughout the hospital course 3 beers a day and no tobacco.  Review of supplementation was given to prevent withdrawal symptoms he is also on alcohol protocol.    Consults: nephrology and hospitalist and urology    Significant Diagnostic Studies: angiography: Preop angiogram    Treatments: Surgery 3/21/2025, right femoral endarterectomy with patch angioplasty    Discharge Exam: He appears well today he is ambulatory his acute pain is resolved.  He wishes to be

## 2025-03-23 NOTE — PLAN OF CARE
Problem: Pain  Goal: Verbalizes/displays adequate comfort level or baseline comfort level  Outcome: Progressing  Flowsheets (Taken 3/22/2025 2000 by Serge Saldana, RN)  Verbalizes/displays adequate comfort level or baseline comfort level:   Encourage patient to monitor pain and request assistance   Assess pain using appropriate pain scale     Problem: Discharge Planning  Goal: Discharge to home or other facility with appropriate resources  Outcome: Progressing  Flowsheets (Taken 3/22/2025 0800 by Sophie Rod, RN)  Discharge to home or other facility with appropriate resources:   Identify barriers to discharge with patient and caregiver   Arrange for needed discharge resources and transportation as appropriate   Identify discharge learning needs (meds, wound care, etc)   Arrange for interpreters to assist at discharge as needed   Refer to discharge planning if patient needs post-hospital services based on physician order or complex needs related to functional status, cognitive ability or social support system     Problem: Safety - Adult  Goal: Free from fall injury  Outcome: Progressing  Flowsheets (Taken 3/22/2025 0800 by Sophie Rod, RN)  Free From Fall Injury:   Instruct family/caregiver on patient safety   Based on caregiver fall risk screen, instruct family/caregiver to ask for assistance with transferring infant if caregiver noted to have fall risk factors     Problem: Nutrition Deficit:  Goal: Optimize nutritional status  Outcome: Progressing  Flowsheets (Taken 3/20/2025 1124 by Leilani Fischer GN)  Nutrient intake appropriate for improving, restoring, or maintaining nutritional needs:   Assess nutritional status and recommend course of action   Monitor oral intake, labs, and treatment plans   Recommend appropriate diets, oral nutritional supplements, and vitamin/mineral supplements     Problem: Respiratory - Adult  Goal: Achieves optimal ventilation and oxygenation  Outcome:

## 2025-03-23 NOTE — PLAN OF CARE
Problem: Pain  Goal: Verbalizes/displays adequate comfort level or baseline comfort level  3/23/2025 1530 by Fabiano Silva RN  Outcome: Completed  3/23/2025 1306 by Fabiano Silva RN  Outcome: Progressing  Flowsheets (Taken 3/22/2025 2000 by Serge Saldana RN)  Verbalizes/displays adequate comfort level or baseline comfort level:   Encourage patient to monitor pain and request assistance   Assess pain using appropriate pain scale     Problem: Discharge Planning  Goal: Discharge to home or other facility with appropriate resources  3/23/2025 1530 by Fabiano Silva RN  Outcome: Completed  3/23/2025 1306 by Fabiano Silva RN  Outcome: Progressing  Flowsheets (Taken 3/22/2025 0800 by Sophie Rod, RN)  Discharge to home or other facility with appropriate resources:   Identify barriers to discharge with patient and caregiver   Arrange for needed discharge resources and transportation as appropriate   Identify discharge learning needs (meds, wound care, etc)   Arrange for interpreters to assist at discharge as needed   Refer to discharge planning if patient needs post-hospital services based on physician order or complex needs related to functional status, cognitive ability or social support system     Problem: Safety - Adult  Goal: Free from fall injury  3/23/2025 1530 by Fabiano Silva RN  Outcome: Completed  3/23/2025 1306 by Fabiano Silva RN  Outcome: Progressing  Flowsheets (Taken 3/22/2025 0800 by Sophie Rod, RN)  Free From Fall Injury:   Instruct family/caregiver on patient safety   Based on caregiver fall risk screen, instruct family/caregiver to ask for assistance with transferring infant if caregiver noted to have fall risk factors     Problem: Nutrition Deficit:  Goal: Optimize nutritional status  3/23/2025 1530 by Fabiano Silva RN  Outcome: Completed  3/23/2025 1306 by Fabiano Silva RN  Outcome: Progressing  Flowsheets (Taken 3/20/2025 1124 by Aaliyah

## 2025-03-24 ENCOUNTER — CARE COORDINATION (OUTPATIENT)
Facility: CLINIC | Age: 70
End: 2025-03-24

## 2025-03-24 NOTE — CARE COORDINATION
Care Transitions Note    Initial Call - Call within 2 business days of discharge: Yes    Attempted to reach patient for transitions of care follow up. Unable to reach patient.    Outreach Attempts:   HIPAA compliant voicemail left for patient.     Patient: Dallin Anne     Reason for Admission, per chart review  - Atherosclerosis of native arteries of extremities with rest pain, right leg   - Occlusion of right femoral artery     RIGHT FEMORAL ENDARTERECTOMY WITH PATCH ANGIOPLASTY; RIGHT LEG ANGIOGRAM       Discharge Date: 3/23/25  RURS: Readmission Risk Score: 14.4    Last Discharge Facility       Date Complaint Diagnosis Description Type Department Provider    3/17/25  Femoral artery occlusion, right Admission (Discharged) [id number] Aldo Noble MD            Was this an external facility discharge? No    Follow Up Appointment:   Patient does not have a follow up appointment scheduled at time of call.   Patient to follow up with specialty provider - Dr. Noble       Plan for follow-up on next business day.      Jolene Morales RN

## 2025-03-25 ENCOUNTER — CARE COORDINATION (OUTPATIENT)
Facility: CLINIC | Age: 70
End: 2025-03-25

## 2025-03-25 NOTE — CARE COORDINATION
Care Transitions Note    Initial Call - Call within 2 business days of discharge: Yes    Attempted to reach patient, spouse/partner  for transitions of care follow up. Unable to reach patient.    Outreach Attempts:   HIPAA compliant voicemail left for patient.     Patient: Dallin Anne    Patient : 1955   MRN: [id number]        Reason for Admission, per chart review:   - Atherosclerosis of native arteries of extremities with rest pain, right leg   - Occlusion of right femoral artery      RIGHT FEMORAL ENDARTERECTOMY WITH PATCH ANGIOPLASTY; RIGHT LEG ANGIOGRAM    Discharge Date: 3/23/25  RURS: Readmission Risk Score: 14.4    Last Discharge Facility       Date Complaint Diagnosis Description Type Department Provider    3/17/25  Femoral artery occlusion, right Admission (Discharged) [id number] Aldo Noble MD            Was this an external facility discharge? No    Follow Up Appointment:    Patient does not have a follow up appointment scheduled at time of call.   Patient to follow up with specialty provider - Dr. Real RO unable to forward [employer] message.  My Chart status noted as pending.        No further follow-up call indicated     Jolene Morales RN

## 2025-04-09 ENCOUNTER — TRANSCRIBE ORDERS (OUTPATIENT)
Facility: HOSPITAL | Age: 70
End: 2025-04-09

## 2025-04-09 DIAGNOSIS — I70.261 ATHEROSCLEROSIS OF NATIVE ARTERY OF RIGHT LOWER EXTREMITY WITH GANGRENE: Primary | ICD-10-CM

## 2025-04-14 ENCOUNTER — HOSPITAL ENCOUNTER (OUTPATIENT)
Facility: HOSPITAL | Age: 70
Discharge: HOME | End: 2025-04-17
Attending: SURGERY
Payer: COMMERCIAL

## 2025-04-14 DIAGNOSIS — I70.261 ATHEROSCLEROSIS OF NATIVE ARTERY OF RIGHT LOWER EXTREMITY WITH GANGRENE: ICD-10-CM

## 2025-04-14 PROCEDURE — 82565 ASSAY OF CREATININE: CPT

## 2025-04-14 PROCEDURE — 75635 CT ANGIO ABDOMINAL ARTERIES: CPT

## 2025-04-14 PROCEDURE — 6360000004 HC RX CONTRAST MEDICATION: Performed by: SURGERY

## 2025-04-14 RX ORDER — IOPAMIDOL 755 MG/ML
100 INJECTION, SOLUTION INTRAVASCULAR
Status: COMPLETED | OUTPATIENT
Start: 2025-04-14 | End: 2025-04-14

## 2025-04-14 RX ADMIN — IOPAMIDOL 100 ML: 755 INJECTION, SOLUTION INTRAVENOUS at 16:34

## 2025-04-15 LAB — CREAT UR-MCNC: 0.6 MG/DL (ref 0.6–1.3)

## 2025-05-23 ENCOUNTER — TELEPHONE (OUTPATIENT)
Facility: CLINIC | Age: 70
End: 2025-05-23

## 2025-05-23 NOTE — TELEPHONE ENCOUNTER
Pt  called in states he has an  ear ache right side and swelling of the gland in the throat on the right side. Onset a week ago.    Pt wants to know if something can be called in.    Please advise.     Pharmacy   [id number] - [place], [place] - [address] [place] [address] [phone] - F [phone] [[id number]]

## 2025-05-27 ENCOUNTER — OFFICE VISIT (OUTPATIENT)
Facility: CLINIC | Age: 70
End: 2025-05-27

## 2025-05-27 DIAGNOSIS — R63.4 WEIGHT LOSS: ICD-10-CM

## 2025-05-27 DIAGNOSIS — E29.1 HYPOGONADISM IN MALE: ICD-10-CM

## 2025-05-27 DIAGNOSIS — E87.1 CHRONIC HYPONATREMIA: ICD-10-CM

## 2025-05-27 DIAGNOSIS — F10.20 UNCOMPLICATED ALCOHOL DEPENDENCE (HCC): ICD-10-CM

## 2025-05-27 DIAGNOSIS — E55.9 VITAMIN D DEFICIENCY: ICD-10-CM

## 2025-05-27 DIAGNOSIS — J43.2 CENTRILOBULAR EMPHYSEMA (HCC): ICD-10-CM

## 2025-05-27 DIAGNOSIS — I10 PRIMARY HYPERTENSION: ICD-10-CM

## 2025-05-27 DIAGNOSIS — Z12.5 PROSTATE CANCER SCREENING: ICD-10-CM

## 2025-05-27 DIAGNOSIS — Z87.891 PERSONAL HISTORY OF TOBACCO USE: ICD-10-CM

## 2025-05-27 DIAGNOSIS — H69.91 DYSFUNCTION OF RIGHT EUSTACHIAN TUBE: Primary | ICD-10-CM

## 2025-05-27 RX ORDER — HYDROCODONE BITARTRATE AND ACETAMINOPHEN 5; 325 MG/1; MG/1
TABLET ORAL
COMMUNITY
Start: 2025-05-01

## 2025-05-27 RX ORDER — DOXYCYCLINE HYCLATE 100 MG
100 TABLET ORAL 2 TIMES DAILY
Qty: 14 TABLET | Refills: 0 | Status: SHIPPED | OUTPATIENT
Start: 2025-05-27 | End: 2025-06-03

## 2025-05-27 RX ORDER — PREDNISONE 10 MG/1
TABLET ORAL
Qty: 1 EACH | Refills: 0 | Status: SHIPPED | OUTPATIENT
Start: 2025-05-27

## 2025-05-27 ASSESSMENT — PATIENT HEALTH QUESTIONNAIRE - PHQ9
SUM OF ALL RESPONSES TO PHQ QUESTIONS 1-9: 0
1. LITTLE INTEREST OR PLEASURE IN DOING THINGS: NOT AT ALL
SUM OF ALL RESPONSES TO PHQ QUESTIONS 1-9: 0
2. FEELING DOWN, DEPRESSED OR HOPELESS: NOT AT ALL
SUM OF ALL RESPONSES TO PHQ QUESTIONS 1-9: 0
SUM OF ALL RESPONSES TO PHQ QUESTIONS 1-9: 0

## 2025-05-27 NOTE — PROGRESS NOTES
Dallin Anne presents today for   Chief Complaint   Patient presents with    Ear Pain           \"Have you been to the ER, urgent care clinic since your last visit?  Hospitalized since your last visit?\"    NO    “Have you seen or consulted any other health care providers outside of [facility] since your last visit?”    Yes, Dr. JAC[facility]    “Have you had a colorectal cancer screening such as a colonoscopy/FIT/Cologuard?    NO    Date of last Colonoscopy: 9/25/2018  No cologuard on file  No FIT/FOBT on file   No flexible sigmoidoscopy on file

## 2025-05-27 NOTE — PROGRESS NOTES
Dallin Anne, was evaluated through a synchronous (real-time) audio-video encounter. The patient (or guardian if applicable) is aware that this is a billable service, which includes applicable co-pays. This Virtual Visit was conducted with patient's (and/or legal guardian's) consent. Patient identification was verified, and a caregiver was present when appropriate.   The patient was located at Home: [address] [place] [phone]  Provider was located at Facility (Appt Dept): [address] [place] [address], [address]  [place] [address]  Confirm you are appropriately licensed, registered, or certified to deliver care in the state where the patient is located as indicated above. If you are not or unsure, please re-schedule the visit: Yes, I confirm.     Dallin Anne (:  1955) is an established patient, presenting virtually for evaluation of the following:    Assessment & Plan   Below is the assessment and plan developed based on review of pertinent history, physical exam, labs, studies, and medications.    ICD-10-CM    1. Dysfunction of right eustachian tube  H69.91 Will treat with predniSONE 10 MG (21) TBPK     And doxycycline hyclate (VIBRA-TABS) 100 MG tablet      2. Uncomplicated alcohol dependence (HCC)  F10.20 Patient has been encouraged to stop alcohol use in the past but continues to struggle to stop.  He has been encouraged to follow-up with support groups such as AA.      3. Centrilobular emphysema (HCC)  J43.2 Currently stable patient has had improvement with Trelegy Ellipta      4. Personal history of tobacco use  Z87.891 WA VISIT TO DISCUSS LUNG CA SCREEN W LDCT     CT Lung Screen (Initial/Annual/Baseline)      5. Primary hypertension  I10 Control uncertain but it can be exacerbated by his alcohol use.  Patient encouraged to try to monitor it at home.  Lipid Panel     Comprehensive Metabolic Panel      6. Chronic hyponatremia  E87.1 Due to history of chronic alcohol use.  Will try

## 2025-05-31 ASSESSMENT — ENCOUNTER SYMPTOMS
ABDOMINAL PAIN: 0
SORE THROAT: 1
RHINORRHEA: 1
SHORTNESS OF BREATH: 0

## 2025-06-07 ENCOUNTER — HOSPITAL ENCOUNTER (OUTPATIENT)
Age: 70
Discharge: HOME | End: 2025-06-07
Payer: COMMERCIAL

## 2025-06-07 ENCOUNTER — HOSPITAL ENCOUNTER (OUTPATIENT)
Age: 70
Discharge: HOME | End: 2025-06-10
Payer: COMMERCIAL

## 2025-06-07 DIAGNOSIS — R63.4 WEIGHT LOSS: ICD-10-CM

## 2025-06-07 DIAGNOSIS — I10 PRIMARY HYPERTENSION: ICD-10-CM

## 2025-06-07 DIAGNOSIS — Z12.5 PROSTATE CANCER SCREENING: ICD-10-CM

## 2025-06-07 DIAGNOSIS — E55.9 VITAMIN D DEFICIENCY: ICD-10-CM

## 2025-06-07 DIAGNOSIS — E29.1 HYPOGONADISM IN MALE: ICD-10-CM

## 2025-06-07 DIAGNOSIS — Z87.891 PERSONAL HISTORY OF TOBACCO USE: ICD-10-CM

## 2025-06-07 LAB
25(OH)D3 SERPL-MCNC: <6 NG/ML (ref 30–100)
ALBUMIN SERPL-MCNC: 3.1 G/DL (ref 3.4–5)
ALBUMIN/GLOB SERPL: 0.8 (ref 0.8–1.7)
ALP SERPL-CCNC: 83 U/L (ref 45–117)
ALT SERPL-CCNC: 32 U/L (ref 10–50)
ANION GAP SERPL CALC-SCNC: 14 MMOL/L (ref 3–18)
AST SERPL-CCNC: 22 U/L (ref 10–38)
BASOPHILS # BLD: 0 K/UL (ref 0–0.1)
BASOPHILS NFR BLD: 0 % (ref 0–2)
BILIRUB SERPL-MCNC: 0.3 MG/DL (ref 0.2–1)
BUN SERPL-MCNC: 10 MG/DL (ref 6–23)
BUN/CREAT SERPL: 19 (ref 12–20)
CALCIUM SERPL-MCNC: 9.1 MG/DL (ref 8.5–10.1)
CHLORIDE SERPL-SCNC: 97 MMOL/L (ref 98–107)
CHOLEST SERPL-MCNC: 153 MG/DL
CO2 SERPL-SCNC: 23 MMOL/L (ref 21–32)
CREAT SERPL-MCNC: 0.53 MG/DL (ref 0.6–1.3)
DIFFERENTIAL METHOD BLD: ABNORMAL
EOSINOPHIL # BLD: 0.13 K/UL (ref 0–0.4)
EOSINOPHIL NFR BLD: 1 % (ref 0–5)
ERYTHROCYTE [DISTWIDTH] IN BLOOD BY AUTOMATED COUNT: 15.9 % (ref 11.6–14.5)
GLOBULIN SER CALC-MCNC: 3.7 G/DL (ref 2–4)
GLUCOSE SERPL-MCNC: 92 MG/DL (ref 74–108)
HCT VFR BLD AUTO: 33.4 % (ref 36–48)
HDLC SERPL-MCNC: 85 MG/DL (ref 40–60)
HDLC SERPL: 1.8 (ref 0–5)
HGB BLD-MCNC: 10.9 G/DL (ref 13–16)
IMM GRANULOCYTES # BLD AUTO: 0 K/UL (ref 0–0.04)
IMM GRANULOCYTES NFR BLD AUTO: 0 % (ref 0–0.5)
LDLC SERPL CALC-MCNC: 56 MG/DL (ref 0–100)
LYMPHOCYTES # BLD: 2.1 K/UL (ref 0.9–3.6)
LYMPHOCYTES NFR BLD: 16 % (ref 21–52)
MCH RBC QN AUTO: 31.6 PG (ref 24–34)
MCHC RBC AUTO-ENTMCNC: 32.6 G/DL (ref 31–37)
MCV RBC AUTO: 96.8 FL (ref 78–100)
MONOCYTES # BLD: 1.7 K/UL (ref 0.05–1.2)
MONOCYTES NFR BLD: 13 % (ref 3–10)
NEUTS SEG # BLD: 9.17 K/UL (ref 1.8–8)
NEUTS SEG NFR BLD: 70 % (ref 40–73)
NRBC # BLD: 0 K/UL (ref 0–0.01)
NRBC BLD-RTO: 0 PER 100 WBC
PLATELET # BLD AUTO: 369 K/UL (ref 135–420)
PLATELET COMMENT: ABNORMAL
PMV BLD AUTO: 9.3 FL (ref 9.2–11.8)
POTASSIUM SERPL-SCNC: 4.2 MMOL/L (ref 3.5–5.5)
PROT SERPL-MCNC: 6.7 G/DL (ref 6.4–8.2)
PSA SERPL-MCNC: 0.9 NG/ML (ref 1.4–4.4)
RBC # BLD AUTO: 3.45 M/UL (ref 4.35–5.65)
RBC MORPH BLD: ABNORMAL
SODIUM SERPL-SCNC: 133 MMOL/L (ref 136–145)
TRIGL SERPL-MCNC: 60 MG/DL (ref 0–150)
TSH, 3RD GENERATION: 2.07 UIU/ML (ref 0.27–4.2)
VLDLC SERPL CALC-MCNC: 12 MG/DL
WBC # BLD AUTO: 13.1 K/UL (ref 4.6–13.2)

## 2025-06-07 PROCEDURE — 84443 ASSAY THYROID STIM HORMONE: CPT

## 2025-06-07 PROCEDURE — 84403 ASSAY OF TOTAL TESTOSTERONE: CPT

## 2025-06-07 PROCEDURE — 85025 COMPLETE CBC W/AUTO DIFF WBC: CPT

## 2025-06-07 PROCEDURE — 82306 VITAMIN D 25 HYDROXY: CPT

## 2025-06-07 PROCEDURE — G0103 PSA SCREENING: HCPCS

## 2025-06-07 PROCEDURE — 80053 COMPREHEN METABOLIC PANEL: CPT

## 2025-06-07 PROCEDURE — 80061 LIPID PANEL: CPT

## 2025-06-07 PROCEDURE — 84402 ASSAY OF FREE TESTOSTERONE: CPT

## 2025-06-07 PROCEDURE — 36415 COLL VENOUS BLD VENIPUNCTURE: CPT

## 2025-06-07 PROCEDURE — 71271 CT THORAX LUNG CANCER SCR C-: CPT

## 2025-06-10 LAB
TESTOST FREE SERPL-MCNC: 3.9 PG/ML (ref 6.6–18.1)
TESTOST SERPL-MCNC: 443 NG/DL (ref 264–916)

## 2025-06-16 ENCOUNTER — TELEPHONE (OUTPATIENT)
Facility: CLINIC | Age: 70
End: 2025-06-16

## 2025-06-16 DIAGNOSIS — R91.8 PULMONARY NODULES: ICD-10-CM

## 2025-06-16 DIAGNOSIS — D50.9 IRON DEFICIENCY ANEMIA, UNSPECIFIED IRON DEFICIENCY ANEMIA TYPE: ICD-10-CM

## 2025-06-16 DIAGNOSIS — E55.9 VITAMIN D DEFICIENCY: ICD-10-CM

## 2025-06-16 DIAGNOSIS — J41.1 MUCOPURULENT CHRONIC BRONCHITIS (HCC): Primary | ICD-10-CM

## 2025-06-16 RX ORDER — ERGOCALCIFEROL 1.25 MG/1
50000 CAPSULE, LIQUID FILLED ORAL WEEKLY
Qty: 12 CAPSULE | Refills: 1 | Status: SHIPPED | OUTPATIENT
Start: 2025-06-16

## 2025-06-16 NOTE — TELEPHONE ENCOUNTER
Ct chest shows multiple pulmonary nodules and chronic bronchitis/emphysema  so will refer to pulmonary for f/u     He has anemia so will refer to GI since he has not had colonoscopy in awhile and hematology to evaluate for other causes of anemia     His vit D level is low and sent in vit D supplement     Anemia and lung issues can cause shortness of breath and fatigue.    Testosterone is a little low but need to adjust issues above first. Rest of labs are good.

## 2025-06-24 NOTE — TELEPHONE ENCOUNTER
Pt called asking what Dr Wu's opinion is about pt taking supplement , prevagen for memory    Additional Progress Note...

## 2025-07-02 ENCOUNTER — TELEPHONE (OUTPATIENT)
Facility: CLINIC | Age: 70
End: 2025-07-02

## 2025-07-02 DIAGNOSIS — R05.1 ACUTE COUGH: Primary | ICD-10-CM

## 2025-07-02 RX ORDER — PREDNISONE 10 MG/1
TABLET ORAL
Qty: 1 EACH | Refills: 0 | Status: SHIPPED | OUTPATIENT
Start: 2025-07-02

## 2025-07-02 RX ORDER — CODEINE PHOSPHATE AND GUAIFENESIN 10; 100 MG/5ML; MG/5ML
5 SOLUTION ORAL 4 TIMES DAILY PRN
Qty: 120 ML | Refills: 0 | Status: SHIPPED | OUTPATIENT
Start: 2025-07-02 | End: 2025-07-09

## 2025-07-02 RX ORDER — DOXYCYCLINE HYCLATE 100 MG
100 TABLET ORAL 2 TIMES DAILY
Qty: 14 TABLET | Refills: 0 | Status: SHIPPED | OUTPATIENT
Start: 2025-07-02 | End: 2025-07-09

## 2025-07-02 NOTE — TELEPHONE ENCOUNTER
Spoke with patient he states when he can cough up sputum it is yellow in color.    Patient states he has not had any fever, chills and no SOB.

## 2025-07-02 NOTE — TELEPHONE ENCOUNTER
Pt said that in the morning, he can't catch his breath. Pt is using his albuterol inhaler, which helps some, but not a lot. Pt isn't sleeping at all. Pt is experiencing dry cough. Pt is requesting to have pcp give him a rx to help the phlegm come up when he coughs and to help him sleep to [facility] on [address]. Pt is requesting a follow-up call. Please advise.

## 2025-07-02 NOTE — TELEPHONE ENCOUNTER
Patient scheduled for:  [] EGD  [x] Colonoscopy  [] EUS  [] Flex Sig   [] Other:     Indication for procedure. [] Screening   [x] History of colonic polyps    Sedation Type: [x] Conscious Sedation   [] MAC   [] None    Procedure Provider:  Giorgi                        Referring Provider. Anthony Weinstein    Arrival time verified: Thurs / 7/11/19 / 1045    Facility location verified:   [x][facility] Endoscopy Unit - [address], 1st Floor, [phone]    Pt meets medical necessity for outpatient procedure in hospital Endoscopy Unit: N/A for this payor  [] [facility], 5th floor      [][facility] - [address], 3rd Floor Surgery check-in       Prep Type:   [x]Golytely eRx:[place], [place] - [address] ;  [] MoviPrep: , [] MiraLax: , [] Other:   []NPO /p [place], No solid food /p 2200 the night before    Anticoagulants or blood thinners: [x]None [] ASA 81mg  - may continue           [] Warfarin   [] Warfarin + Lovenox bridge [] Plavix [] Effient [] Eliquis         [] Xarelto  [] Brilinta [] NSAIDS  [] Other    LAST anticoagulant dose: Date/Time:   INR:     Electronic implanted devices: [x] No  [] IPG  []  ICD  []  LVAD  []     H&P / Pre op physical completed: [x] N/A, [] Complete, Date , [] Scheduled, Date , [] No,     Additional Information: MELD (NIH) 11    Rescheduled from 5/16/19    _______________________________________________       Instructions given: [] Rec'd & Read   [x] Reviewed         [] Resent via [employer] -      [x] Resent via eMail - Pt requests resend endoscopy appointment information communication via unencrypted e-mail. This includes Split-dose Golytely  instructions, Conscious Sedation policy, procedure date/time/location/provider.  Pt acknowledges that they have agreed to accept the risks and receive unencrypted communications for this incidence.       Pre procedure teaching completed: [x] Yes - Reviewed, [] No,     [x]  Antibiotic and cough med sent in  as well as prednisone    No questions regarding Sedation as ordered, []     Transportation from procedure & responsible adult to be with patient following procedure for a minimum of 6 hrs (Conscious Sedation) 24 hrs (MAC): [x] Yes Wife - confirmed will have post-procedure companionship as required, [] Pending , [] No     Eloina Singh, RN  [employer]/[facility] Endoscopy

## 2025-07-17 ENCOUNTER — TELEPHONE (OUTPATIENT)
Facility: CLINIC | Age: 70
End: 2025-07-17

## 2025-07-17 NOTE — TELEPHONE ENCOUNTER
PCP: Tex Wu MD    Refill Request     LAST OFFICE VISIT: 05/27/2025      Medication:   fluticasone-umeclidin-vilant (TRELEGY ELLIPTA) 200-62.5-25 MCG/ACT AEPB inhaler [[id number]]   Providers      Ph[facility] [place] [id number] [facility]       No future appointments.

## 2025-07-18 RX ORDER — FLUTICASONE FUROATE, UMECLIDINIUM BROMIDE AND VILANTEROL TRIFENATATE 200; 62.5; 25 UG/1; UG/1; UG/1
POWDER RESPIRATORY (INHALATION)
Qty: 1 EACH | Refills: 5 | Status: SHIPPED | OUTPATIENT
Start: 2025-07-18

## 2025-07-23 ENCOUNTER — OFFICE VISIT (OUTPATIENT)
Facility: CLINIC | Age: 70
End: 2025-07-23
Payer: COMMERCIAL

## 2025-07-23 VITALS
WEIGHT: 122 LBS | TEMPERATURE: 97.4 F | HEIGHT: 72 IN | RESPIRATION RATE: 20 BRPM | HEART RATE: 97 BPM | BODY MASS INDEX: 16.52 KG/M2 | DIASTOLIC BLOOD PRESSURE: 88 MMHG | SYSTOLIC BLOOD PRESSURE: 149 MMHG

## 2025-07-23 DIAGNOSIS — M54.50 ACUTE MIDLINE LOW BACK PAIN WITHOUT SCIATICA: Primary | ICD-10-CM

## 2025-07-23 PROCEDURE — 1123F ACP DISCUSS/DSCN MKR DOCD: CPT | Performed by: INTERNAL MEDICINE

## 2025-07-23 PROCEDURE — 99214 OFFICE O/P EST MOD 30 MIN: CPT | Performed by: INTERNAL MEDICINE

## 2025-07-23 RX ORDER — PREDNISONE 10 MG/1
TABLET ORAL
Qty: 1 EACH | Refills: 0 | Status: SHIPPED | OUTPATIENT
Start: 2025-07-23

## 2025-07-23 RX ORDER — OXYCODONE AND ACETAMINOPHEN 5; 325 MG/1; MG/1
1 TABLET ORAL EVERY 6 HOURS PRN
Qty: 28 TABLET | Refills: 0 | Status: SHIPPED | OUTPATIENT
Start: 2025-07-23 | End: 2025-07-30

## 2025-07-23 NOTE — PROGRESS NOTES
Dallin Omid presents today for   Chief Complaint   Patient presents with    Back Pain           \"Have you been to the ER, urgent care clinic since your last visit?  Hospitalized since your last visit?\"    NO    “Have you seen or consulted any other health care providers outside of [facility] since your last visit?”    NO    “Have you had a colorectal cancer screening such as a colonoscopy/FIT/Cologuard?    NO    Date of last Colonoscopy: 9/25/2018  No cologuard on file  No FIT/FOBT on file   No flexible sigmoidoscopy on file              
  Medication Sig    oxyCODONE-acetaminophen (PERCOCET) 5-325 MG per tablet Take 1 tablet by mouth every 6 hours as needed for Pain for up to 7 days. Intended supply: 7 days. Take lowest dose possible to manage pain Max Daily Amount: 4 tablets    predniSONE 10 MG (21) TBPK Use as directed    fluticasone-umeclidin-vilant (TRELEGY ELLIPTA) 200-62.5-25 MCG/ACT AEPB inhaler inhale 1 puff  BY MOUTH daily    cyclobenzaprine (FLEXERIL) 10 MG tablet Take 1 tablet by mouth 3 times daily as needed for Muscle spasms    vitamin D (ERGOCALCIFEROL) 1.25 MG (98844 UT) CAPS capsule Take 1 capsule by mouth once a week    HYDROcodone-acetaminophen (NORCO) 5-325 MG per tablet Prescribed by Dr. JACOBO Kirkland    aspirin 81 MG chewable tablet Take 1 tablet by mouth daily    apixaban (ELIQUIS) 5 MG TABS tablet Take 1 tablet by mouth 2 times daily    fluticasone (FLONASE) 50 MCG/ACT nasal spray instill 2 sprays into each nostril once daily    ipratropium 0.5 mg-albuterol 2.5 mg (DUONEB) 0.5-2.5 (3) MG/3ML SOLN nebulizer solution inhale contents of 1 vial ( 3 milliliters ) in nebulizer by mouth and INTO THE LUNGS every 4 hours    albuterol sulfate HFA (PROVENTIL;VENTOLIN;PROAIR) 108 (90 Base) MCG/ACT inhaler inhale 2 puffs by mouth and INTO THE LUNGS every 4 hours if needed for wheezing     No current facility-administered medications for this visit.                 An electronic signature was used to authenticate this note.    --ERIKA CASTILLO MD

## 2025-07-29 NOTE — TELEPHONE ENCOUNTER
PCP: Tex Wu MD    Refill Request     LAST OFFICE VISIT: 07/23/2025      Medication:  .apixaban (ELIQUIS) 5 MG TABS tablet   Lidocain patches      Pharmacy [facility] [address]       No future appointments.   .apixaban (ELIQUIS) 5 MG TABS tablet   Lidocain patches   [facility] [address]

## 2025-07-31 RX ORDER — LIDOCAINE 50 MG/G
1 PATCH TOPICAL DAILY
Qty: 30 PATCH | Refills: 5 | Status: SHIPPED | OUTPATIENT
Start: 2025-07-31 | End: 2026-01-27

## 2025-08-09 ENCOUNTER — HOSPITAL ENCOUNTER (OUTPATIENT)
Age: 70
Discharge: HOME | End: 2025-08-12
Payer: COMMERCIAL

## 2025-08-09 DIAGNOSIS — M54.50 ACUTE MIDLINE LOW BACK PAIN WITHOUT SCIATICA: ICD-10-CM

## 2025-08-09 PROCEDURE — 72131 CT LUMBAR SPINE W/O DYE: CPT

## 2025-08-22 ENCOUNTER — TELEPHONE (OUTPATIENT)
Facility: CLINIC | Age: 70
End: 2025-08-22

## 2025-08-22 DIAGNOSIS — M17.9 OSTEOARTHRITIS OF KNEE, UNSPECIFIED LATERALITY, UNSPECIFIED OSTEOARTHRITIS TYPE: Primary | ICD-10-CM

## 2025-08-22 RX ORDER — OXYCODONE AND ACETAMINOPHEN 5; 325 MG/1; MG/1
1 TABLET ORAL EVERY 6 HOURS PRN
Qty: 28 TABLET | Refills: 0 | Status: SHIPPED | OUTPATIENT
Start: 2025-08-22 | End: 2025-08-29

## 2025-08-27 ENCOUNTER — TELEPHONE (OUTPATIENT)
Facility: CLINIC | Age: 70
End: 2025-08-27

## 2025-08-27 DIAGNOSIS — G89.29 CHRONIC MIDLINE LOW BACK PAIN WITH LEFT-SIDED SCIATICA: Primary | ICD-10-CM

## 2025-08-27 DIAGNOSIS — M54.42 CHRONIC MIDLINE LOW BACK PAIN WITH LEFT-SIDED SCIATICA: Primary | ICD-10-CM

## (undated) DEVICE — ADHESIVE SKIN CLSR 0.7ML TOP DERMBND ADV

## (undated) DEVICE — NEEDLE ANGIO 1 WALL ULT SMOOTH 19GAX7CM MERIT AVANCE

## (undated) DEVICE — 450 ML BOTTLE OF 0.05% CHLORHEXIDINE GLUCONATE IN 99.95% STERILE WATER FOR IRRIGATION, USP AND APPLICATOR.: Brand: IRRISEPT ANTIMICROBIAL WOUND LAVAGE

## (undated) DEVICE — PACK PROCEDURE SURG TOT HIP BSHR LF

## (undated) DEVICE — GUIDEWIRE VASC L180CM DIA0.035IN L15CM STR TIP PTFE S STL

## (undated) DEVICE — BLADE ES L2.75IN ELASTOMERIC COAT DURABLE BEND UPTO 90DEG

## (undated) DEVICE — NEPTUNE E-SEP SMOKE EVACUATION PENCIL, COATED, 70MM BLADE, PUSH BUTTON SWITCH: Brand: NEPTUNE E-SEP

## (undated) DEVICE — MASK SURG REG ORNG LEV 3 SFTY SEAL 4 LAYR SFT INNR LINING

## (undated) DEVICE — SUTURE VCRL SZ 1 L36IN ABSRB VLT L36MM CT-1 1/2 CIR J347H

## (undated) DEVICE — SOLUTION IV 1000ML 0.9% SOD CHL

## (undated) DEVICE — GLOVE SURG SZ 6.5 L11.2IN FNGR THK9.8MIL STRW LTX POLYMER

## (undated) DEVICE — 4F 40 CM NOVASIL SILICONE SINGLE LUMEN EMBOLECTOMY CATHETER, EIFU: Brand: LEMAITRE EMBOLECTOMY CATHETER

## (undated) DEVICE — FCPS BIOP PULM RAD JAW 100CML -- BX/10 M00515180

## (undated) DEVICE — REAG CYTO FIX 4OZ PMP SPRY --

## (undated) DEVICE — AIRLIFE™ NASAL OXYGEN CANNULA CURVED, FLARED TIP WITH 14 FOOT (4.3 M) CRUSH-RESISTANT TUBING, OVER-THE-EAR STYLE: Brand: AIRLIFE™

## (undated) DEVICE — WANG TRANSBRONCHIAL HISTOLOGY NEEDLE FOR CENTRAL REGION, 1.9 MM X 130 CM: Brand: WANG

## (undated) DEVICE — SLIDE MICRO PLAIN PRECLEAND --

## (undated) DEVICE — CONTAINER PREFIL FRMLN 40ML --

## (undated) DEVICE — ELECTRODE PT RET AD L9FT HI MOIST COND ADH HYDRGEL CORDED

## (undated) DEVICE — ROD RMR L950MM DIA2.5MM BALL TIP EXTN FOR IM RM AND BNE

## (undated) DEVICE — KENDALL SCD EXPRESS SLEEVES, KNEE LENGTH, MEDIUM: Brand: KENDALL SCD

## (undated) DEVICE — 3.2MM THREE-FLUTED DRILL BIT QC/NEEDLE POINT/145MM

## (undated) DEVICE — INTRODUCER SHTH 6FR CANN L11CM DIL TIP 35MM GRN TUNGSTEN

## (undated) DEVICE — THREE-QUARTER SHEET: Brand: CONVERTORS

## (undated) DEVICE — BITE BLOCK ENDOSCP UNIV AD 6 TO 9.4 MM

## (undated) DEVICE — SYRINGE MED 10ML LUERLOCK TIP W/O SFTY DISP

## (undated) DEVICE — SUTURE MONOCRYL SZ 3-0 L27IN ABSRB UD L26MM SH 1/2 CIR Y416H

## (undated) DEVICE — SOLUTION IRRIG 1000ML H2O STRL BLT

## (undated) DEVICE — SUTURE N ABSRB L 36 IN SZ 5-0 NDL L 13 MM POLYPRO OR PPL BLU

## (undated) DEVICE — Device

## (undated) DEVICE — GOWN,REINFORCED,POLY,AURORA,XLARGE,STRL: Brand: MEDLINE

## (undated) DEVICE — SET ADMIN L104IN 20 GTT GRAV RLER CLMP SMRT SITE NDL FREE

## (undated) DEVICE — TRAY PREP DRY SKIN W/ 5 COMPARTMENT W/ REM BSIN AND FCPS

## (undated) DEVICE — FLEX ADVANTAGE 3000CC: Brand: FLEX ADVANTAGE

## (undated) DEVICE — SUTURE PROL SZ 7-0 L30IN NONABSORBABLE BLU L9.3MM BV-1 1/2 8703H

## (undated) DEVICE — GLOVE SURG SZ 7 L11.33IN FNGR THK9.8MIL STRW LTX POLYMER

## (undated) DEVICE — 1860S HEALTH CARE RESPIRATOR N95 120EA/C: Brand: 3M™

## (undated) DEVICE — UNDERGLOVE SURG SZ 7 BLU LTX FREE SYN POLYISOPRENE POLYMER

## (undated) DEVICE — BE 105-8 BRONCHOSCOPE SWIVEL - 15MM ID/22MM OD (PATIENT PORT) X15MM OD (EQUIPMENT PORT). REUSABLE.  FITS COMPONENTS OF ADULT VENTILATOR CIRCUITS.  MOLDED OF POLYETHERIMIDE. INCLUDES TWO SILICONE RUBBER CAPS; ONE CAP ALLOWS FOR THE USE OF A SUCTIONING CATHETER WHILE THE OTHER CAP ALLOWS FOR THE USE OF A FIBER-OPTIC BRONCHOSCOPE WITHOUT SIGNIFICANT LOSS OF PEEP.: Brand: BE 105-8 BRONCHOSCOPE SWIVEL

## (undated) DEVICE — SYR 10ML SLIP TIP 1/5ML GRAD --

## (undated) DEVICE — AIRLIFE™ MISTY MAX 10™ NEBULIZER WITH 7 FOOT (2.1 M) CRUSH RESISTANT OXYGEN TUBING, BAFFLED TEE ADAPTER (22 MM I.D./22 MM O.D.), MOUTHPIECE AND 6 INCH (15 CM) FLEXTUBE: Brand: AIRLIFE™

## (undated) DEVICE — O.R TOWEL, X-RAY DETECTABLE: Brand: DEROYAL

## (undated) DEVICE — BASIN EMESIS 500CC ROSE 250/CS 60/PLT: Brand: MEDEGEN MEDICAL PRODUCTS, LLC

## (undated) DEVICE — SUTURE MONOCRYL SZ 4 0 L18IN ABSRB VLT PS 1 L24MM 3 8 CIR REV Y682H

## (undated) DEVICE — PROBE VASC 8MHZ WTRPRF

## (undated) DEVICE — TABLE COVER: Brand: CONVERTORS

## (undated) DEVICE — AIRLIFE™ ADULT OXYGEN MASK VINYL, UNDER-THE-CHIN STYLE, MEDIUM CONCENTRATION MASK WITH 7 FEET (2.1 M) CRUSH-RESISTANT OXYGEN TUBING: Brand: AIRLIFE™

## (undated) DEVICE — BLADE CLIPPER GEN PURP NS

## (undated) DEVICE — DEVICE STBL AD TRICOT ANCHR PD FOR 3 W F CATH STATLOK

## (undated) DEVICE — SUTURE GOR TX SZ 5-0 L30IN NONABSORBABLE L12MM TTC-12 3/8 6M10A

## (undated) DEVICE — ROD RMR L950MM DIA2.5MM W/ EXTN BALL TIP

## (undated) DEVICE — REM POLYHESIVE ADULT PATIENT RETURN ELECTRODE: Brand: VALLEYLAB

## (undated) DEVICE — DRAPE TWL SURG 16X26IN BLU ORB04] ALLCARE INC]

## (undated) DEVICE — SUTURE VCRL SZ 2-0 L36IN ABSRB UD L36MM CT-1 1/2 CIR J945H

## (undated) DEVICE — GOWN ISOL IMPERV UNIV, DISP, OPEN BACK, BLUE --

## (undated) DEVICE — SINGLE USE SUCTION VALVE MAJ-209: Brand: SINGLE USE SUCTION VALVE (STERILE)

## (undated) DEVICE — SYSTEM INF CTRL

## (undated) DEVICE — SUTURE PERMA-HAND SZ 3-0 L24IN NONABSORBABLE BLK W/O NDL SA74H

## (undated) DEVICE — MEDI-VAC SUCTION HIGH CAPACITY: Brand: CARDINAL HEALTH

## (undated) DEVICE — MAILER SLDE MICSCP 2 PLC --

## (undated) DEVICE — (D)GLOVE SURG ORTH 7.5 PWD LTX -- DISC BY MFR USE ITEM 278014

## (undated) DEVICE — AGENT HEMSTAT 1GM PORCINE GEL ABSRB PWD FOR CONT OOZING

## (undated) DEVICE — CATHETER SUCT TR FL TIP 14FR W/ O CTRL

## (undated) DEVICE — TRAY PREP DRY W/ PREM GLV 2 APPL 6 SPNG 2 UNDPD 1 OVERWRAP

## (undated) DEVICE — NDL BX EXCELON 21GX130CM --

## (undated) DEVICE — NEEDLE SPNL 22GA L3.5IN BLK HUB S STL REG WALL FIT STYL W/

## (undated) DEVICE — MEDI-VAC NON-CONDUCTIVE SUCTION TUBING: Brand: CARDINAL HEALTH

## (undated) DEVICE — CATHETER ANGIO BER2 038 4 FRX65 CM TEMPO AQUA

## (undated) DEVICE — 3M™ IOBAN™ 2 ANTIMICROBIAL INCISE DRAPE 6651EZ: Brand: IOBAN™ 2

## (undated) DEVICE — BRUSH CYTO BRONCHSCP 1.5/140MM -- CELLEBRITY

## (undated) DEVICE — HEX-LOCKING BLADE ELECTRODE: Brand: EDGE

## (undated) DEVICE — SUTURE PROL 5 0 L36IN NONABSORBABLE C 1 DBL ARMED MFIL

## (undated) DEVICE — BLADE, TONGUE, 6", STERILE: Brand: MEDLINE

## (undated) DEVICE — APPLICATOR FBR TIP L6IN COT TIP WOOD SHFT SWAB 2000 PER CA

## (undated) DEVICE — APPLICATOR MEDICATED 26 CC SOLUTION HI LT ORNG CHLORAPREP

## (undated) DEVICE — BIT DRL L165MM DIA4.5MM JCBS CHK L135MM STP CANN

## (undated) DEVICE — DECANTER BAG 9": Brand: MEDLINE INDUSTRIES, INC.

## (undated) DEVICE — CATHETER ART 20 GAX4 CM 22 GA RADIAL FEP

## (undated) DEVICE — SINGLE USE BIOPSY VALVE MAJ-210: Brand: SINGLE USE BIOPSY VALVE (STERILE)

## (undated) DEVICE — UNDERGLOVE SURG SZ 7.5 BLU LTX FREE SYN POLYISOPRENE

## (undated) DEVICE — 1860 HEALTH CARE N95 MASK, 20EACH/BOX  6 BX/C: Brand: 3M™

## (undated) DEVICE — KIT INCIS MGMT 13CM PEEL AND PLC DISPOSABLE PREVENA

## (undated) DEVICE — SUTURE NONABSORBABLE MONOFILAMENT 6-0 C-1 1X30 IN PROLENE 8706H

## (undated) DEVICE — IMMOB SHLDR W/WAIST STRP L --

## (undated) DEVICE — STERILE POLYISOPRENE POWDER-FREE SURGICAL GLOVES: Brand: PROTEXIS

## (undated) DEVICE — SLEEVE COMPR STD 12 IN FOR 165IN CALF COMFORT VENODYNE SYS

## (undated) DEVICE — KIT CLN UP BON SECOURS MARYV

## (undated) DEVICE — SOLUTION SCRB 4OZ 4% CHG CLN BASE FOR PT SKIN ANTISEPSIS

## (undated) DEVICE — PREMIUM DRY TRAY LF: Brand: MEDLINE INDUSTRIES, INC.

## (undated) DEVICE — 3M™ BAIR PAWS FLEX™ WARMING GOWN, STANDARD, 20 PER CASE 81003: Brand: BAIR PAWS™

## (undated) DEVICE — 1LYRTR 16FR10ML100%SILI UMSNAP: Brand: MEDLINE INDUSTRIES, INC.

## (undated) DEVICE — INTENDED FOR TISSUE SEPARATION, AND OTHER PROCEDURES THAT REQUIRE A SHARP SURGICAL BLADE TO PUNCTURE OR CUT.: Brand: BARD-PARKER SAFETY BLADES SIZE 11, STERILE

## (undated) DEVICE — KERLIX BANDAGE ROLL: Brand: KERLIX

## (undated) DEVICE — SUTURE MONOCRYL SZ 2-0 L36IN ABSRB UD L36MM CT-1 1/2 CIR Y945H

## (undated) DEVICE — SUTURE VCRL SZ 0 L36IN ABSRB UD L36MM CT-1 1/2 CIR J946H

## (undated) DEVICE — APPLIER CLP L9.38IN M LIG TI DISP STR RNG HNDL LIGACLP

## (undated) DEVICE — SUTURE PERMAHAND SZ 2-0 L30IN NONABSORBABLE BLK SILK W/O A305H